# Patient Record
Sex: MALE | Race: WHITE | NOT HISPANIC OR LATINO | Employment: FULL TIME | URBAN - NONMETROPOLITAN AREA
[De-identification: names, ages, dates, MRNs, and addresses within clinical notes are randomized per-mention and may not be internally consistent; named-entity substitution may affect disease eponyms.]

---

## 2019-01-30 ENCOUNTER — HOSPITAL ENCOUNTER (OUTPATIENT)
Dept: CT IMAGING | Facility: HOSPITAL | Age: 39
Discharge: HOME | End: 2019-01-30
Admitting: NURSE PRACTITIONER

## 2019-01-30 ENCOUNTER — OFFICE VISIT (OUTPATIENT)
Dept: INTERNAL MEDICINE | Facility: CLINIC | Age: 39
End: 2019-01-30

## 2019-01-30 ENCOUNTER — APPOINTMENT (OUTPATIENT)
Dept: LAB | Facility: HOSPITAL | Age: 39
End: 2019-01-30

## 2019-01-30 VITALS
HEART RATE: 85 BPM | HEIGHT: 69 IN | OXYGEN SATURATION: 98 % | TEMPERATURE: 97.8 F | WEIGHT: 214 LBS | BODY MASS INDEX: 31.7 KG/M2 | RESPIRATION RATE: 15 BRPM | SYSTOLIC BLOOD PRESSURE: 120 MMHG | DIASTOLIC BLOOD PRESSURE: 82 MMHG

## 2019-01-30 DIAGNOSIS — R10.84 GENERALIZED ABDOMINAL PAIN: Primary | ICD-10-CM

## 2019-01-30 DIAGNOSIS — R10.31 RIGHT LOWER QUADRANT ABDOMINAL PAIN: ICD-10-CM

## 2019-01-30 DIAGNOSIS — B02.9 HERPES ZOSTER WITHOUT COMPLICATION: ICD-10-CM

## 2019-01-30 DIAGNOSIS — R10.32 LEFT LOWER QUADRANT PAIN: ICD-10-CM

## 2019-01-30 DIAGNOSIS — Z76.89 ENCOUNTER TO ESTABLISH CARE: ICD-10-CM

## 2019-01-30 LAB
ALBUMIN SERPL-MCNC: 4.6 G/DL (ref 3.5–5)
ALBUMIN/GLOB SERPL: 1.5 G/DL (ref 1–2)
ALP SERPL-CCNC: 60 U/L (ref 38–126)
ALT SERPL W P-5'-P-CCNC: 63 U/L (ref 13–69)
AMYLASE SERPL-CCNC: 86 U/L (ref 30–110)
ANION GAP SERPL CALCULATED.3IONS-SCNC: 11.5 MMOL/L (ref 10–20)
AST SERPL-CCNC: 39 U/L (ref 15–46)
BASOPHILS # BLD AUTO: 0.06 10*3/MM3 (ref 0–0.2)
BASOPHILS NFR BLD AUTO: 0.8 % (ref 0–2.5)
BILIRUB BLD-MCNC: ABNORMAL MG/DL
BILIRUB SERPL-MCNC: 0.6 MG/DL (ref 0.2–1.3)
BUN BLD-MCNC: 12 MG/DL (ref 7–20)
BUN/CREAT SERPL: 10 (ref 6.3–21.9)
CALCIUM SPEC-SCNC: 9.6 MG/DL (ref 8.4–10.2)
CHLORIDE SERPL-SCNC: 104 MMOL/L (ref 98–107)
CLARITY, POC: ABNORMAL
CO2 SERPL-SCNC: 29 MMOL/L (ref 26–30)
COLOR UR: YELLOW
CREAT BLD-MCNC: 1.2 MG/DL (ref 0.6–1.3)
DEPRECATED RDW RBC AUTO: 41.6 FL (ref 37–54)
EOSINOPHIL # BLD AUTO: 0.39 10*3/MM3 (ref 0–0.7)
EOSINOPHIL NFR BLD AUTO: 5.3 % (ref 0–7)
ERYTHROCYTE [DISTWIDTH] IN BLOOD BY AUTOMATED COUNT: 12.2 % (ref 11.5–14.5)
GFR SERPL CREATININE-BSD FRML MDRD: 67 ML/MIN/1.73
GGT SERPL-CCNC: 36 U/L (ref 12–58)
GLOBULIN UR ELPH-MCNC: 3.1 GM/DL
GLUCOSE BLD-MCNC: 88 MG/DL (ref 74–98)
GLUCOSE UR STRIP-MCNC: NEGATIVE MG/DL
HCT VFR BLD AUTO: 43.8 % (ref 42–52)
HGB BLD-MCNC: 15.2 G/DL (ref 14–18)
IMM GRANULOCYTES # BLD AUTO: 0.01 10*3/MM3 (ref 0–0.06)
IMM GRANULOCYTES NFR BLD AUTO: 0.1 % (ref 0–0.6)
KETONES UR QL: NEGATIVE
LEUKOCYTE EST, POC: NEGATIVE
LIPASE SERPL-CCNC: 60 U/L (ref 23–300)
LYMPHOCYTES # BLD AUTO: 2.89 10*3/MM3 (ref 0.6–3.4)
LYMPHOCYTES NFR BLD AUTO: 39.1 % (ref 10–50)
MCH RBC QN AUTO: 32.3 PG (ref 27–31)
MCHC RBC AUTO-ENTMCNC: 34.7 G/DL (ref 30–37)
MCV RBC AUTO: 93 FL (ref 80–94)
MONOCYTES # BLD AUTO: 0.76 10*3/MM3 (ref 0–0.9)
MONOCYTES NFR BLD AUTO: 10.3 % (ref 0–12)
NEUTROPHILS # BLD AUTO: 3.29 10*3/MM3 (ref 2–6.9)
NEUTROPHILS NFR BLD AUTO: 44.4 % (ref 37–80)
NITRITE UR-MCNC: NEGATIVE MG/ML
NRBC BLD AUTO-RTO: 0 /100 WBC (ref 0–0)
PH UR: 5 [PH] (ref 5–8)
PLATELET # BLD AUTO: 211 10*3/MM3 (ref 130–400)
PMV BLD AUTO: 9.6 FL (ref 6–12)
POTASSIUM BLD-SCNC: 4.5 MMOL/L (ref 3.5–5.1)
PROT SERPL-MCNC: 7.7 G/DL (ref 6.3–8.2)
PROT UR STRIP-MCNC: NEGATIVE MG/DL
RBC # BLD AUTO: 4.71 10*6/MM3 (ref 4.7–6.1)
RBC # UR STRIP: NEGATIVE /UL
SODIUM BLD-SCNC: 140 MMOL/L (ref 137–145)
SP GR UR: 1.02 (ref 1–1.03)
UROBILINOGEN UR QL: ABNORMAL
WBC NRBC COR # BLD: 7.4 10*3/MM3 (ref 4.8–10.8)

## 2019-01-30 PROCEDURE — 85025 COMPLETE CBC W/AUTO DIFF WBC: CPT | Performed by: NURSE PRACTITIONER

## 2019-01-30 PROCEDURE — 74176 CT ABD & PELVIS W/O CONTRAST: CPT

## 2019-01-30 PROCEDURE — 36415 COLL VENOUS BLD VENIPUNCTURE: CPT | Performed by: NURSE PRACTITIONER

## 2019-01-30 PROCEDURE — 80053 COMPREHEN METABOLIC PANEL: CPT | Performed by: NURSE PRACTITIONER

## 2019-01-30 PROCEDURE — 99203 OFFICE O/P NEW LOW 30 MIN: CPT | Performed by: NURSE PRACTITIONER

## 2019-01-30 PROCEDURE — 83690 ASSAY OF LIPASE: CPT | Performed by: NURSE PRACTITIONER

## 2019-01-30 PROCEDURE — 81003 URINALYSIS AUTO W/O SCOPE: CPT | Performed by: NURSE PRACTITIONER

## 2019-01-30 PROCEDURE — 82150 ASSAY OF AMYLASE: CPT | Performed by: NURSE PRACTITIONER

## 2019-01-30 PROCEDURE — 82977 ASSAY OF GGT: CPT | Performed by: NURSE PRACTITIONER

## 2019-01-30 RX ORDER — KETOROLAC TROMETHAMINE 30 MG/ML
60 INJECTION, SOLUTION INTRAMUSCULAR; INTRAVENOUS ONCE
Status: COMPLETED | OUTPATIENT
Start: 2019-01-30 | End: 2019-01-30

## 2019-01-30 RX ADMIN — KETOROLAC TROMETHAMINE 60 MG: 30 INJECTION, SOLUTION INTRAMUSCULAR; INTRAVENOUS at 17:22

## 2019-01-31 RX ORDER — VALACYCLOVIR HYDROCHLORIDE 1 G/1
1000 TABLET, FILM COATED ORAL 3 TIMES DAILY
Qty: 21 TABLET | Refills: 0 | Status: SHIPPED | OUTPATIENT
Start: 2019-01-31 | End: 2019-02-07

## 2019-02-01 RX ORDER — GABAPENTIN 100 MG/1
100 CAPSULE ORAL 3 TIMES DAILY
Qty: 21 CAPSULE | Refills: 0 | Status: SHIPPED | OUTPATIENT
Start: 2019-02-01 | End: 2019-02-06 | Stop reason: SDUPTHER

## 2019-02-06 DIAGNOSIS — B02.9 HERPES ZOSTER WITHOUT COMPLICATION: ICD-10-CM

## 2019-02-06 RX ORDER — GABAPENTIN 300 MG/1
300 CAPSULE ORAL 2 TIMES DAILY
Qty: 14 CAPSULE | Refills: 0 | Status: SHIPPED | OUTPATIENT
Start: 2019-02-06 | End: 2019-02-13

## 2019-02-15 RX ORDER — GABAPENTIN 300 MG/1
300 CAPSULE ORAL 2 TIMES DAILY
Qty: 14 CAPSULE | Refills: 0 | Status: SHIPPED | OUTPATIENT
Start: 2019-02-15 | End: 2019-02-22

## 2021-06-11 ENCOUNTER — CLINICAL SUPPORT (OUTPATIENT)
Dept: INTERNAL MEDICINE | Facility: CLINIC | Age: 41
End: 2021-06-11

## 2021-06-11 DIAGNOSIS — L25.5 DERMATITIS DUE TO PLANTS, INCLUDING POISON IVY, SUMAC, AND OAK: Primary | ICD-10-CM

## 2021-06-11 DIAGNOSIS — L23.7 POISON IVY: ICD-10-CM

## 2021-06-11 PROCEDURE — 96372 THER/PROPH/DIAG INJ SC/IM: CPT | Performed by: NURSE PRACTITIONER

## 2021-06-11 RX ORDER — METHYLPREDNISOLONE ACETATE 80 MG/ML
80 INJECTION, SUSPENSION INTRA-ARTICULAR; INTRALESIONAL; INTRAMUSCULAR; SOFT TISSUE ONCE
Status: COMPLETED | OUTPATIENT
Start: 2021-06-11 | End: 2021-06-11

## 2021-06-11 RX ADMIN — METHYLPREDNISOLONE ACETATE 80 MG: 80 INJECTION, SUSPENSION INTRA-ARTICULAR; INTRALESIONAL; INTRAMUSCULAR; SOFT TISSUE at 10:49

## 2021-12-22 ENCOUNTER — TELEPHONE (OUTPATIENT)
Dept: INTERNAL MEDICINE | Facility: CLINIC | Age: 41
End: 2021-12-22

## 2021-12-22 NOTE — TELEPHONE ENCOUNTER
Caller: Will Gamez    Relationship: Self    Best call back number: [phone]    What form or medical record are you requesting: SLEEP STUDY RESULTS    Who is requesting this form or medical record from you: FOR HIS OWN RECORDS    How would you like to receive the form or medical records (pick-up, mail, fax):     Timeframe paperwork needed: ASAP    Additional notes: PLEASE CALL IF FOUND IN CHART

## 2022-02-11 DIAGNOSIS — R06.81 WITNESSED EPISODE OF APNEA: ICD-10-CM

## 2022-02-11 DIAGNOSIS — R53.83 FATIGUE, UNSPECIFIED TYPE: ICD-10-CM

## 2022-02-11 DIAGNOSIS — R06.83 SNORING: ICD-10-CM

## 2022-02-11 DIAGNOSIS — G47.30 SLEEP APNEA, UNSPECIFIED TYPE: Primary | ICD-10-CM

## 2022-03-21 ENCOUNTER — OFFICE VISIT (OUTPATIENT)
Dept: SLEEP MEDICINE | Facility: CLINIC | Age: 42
End: 2022-03-21

## 2022-03-21 VITALS
OXYGEN SATURATION: 97 % | WEIGHT: 218 LBS | BODY MASS INDEX: 32.29 KG/M2 | HEART RATE: 90 BPM | HEIGHT: 69 IN | DIASTOLIC BLOOD PRESSURE: 87 MMHG | SYSTOLIC BLOOD PRESSURE: 136 MMHG

## 2022-03-21 DIAGNOSIS — G47.33 OBSTRUCTIVE SLEEP APNEA, ADULT: Primary | ICD-10-CM

## 2022-03-21 DIAGNOSIS — E66.09 CLASS 1 OBESITY DUE TO EXCESS CALORIES WITHOUT SERIOUS COMORBIDITY WITH BODY MASS INDEX (BMI) OF 32.0 TO 32.9 IN ADULT: ICD-10-CM

## 2022-03-21 PROCEDURE — 99202 OFFICE O/P NEW SF 15 MIN: CPT | Performed by: INTERNAL MEDICINE

## 2022-04-05 NOTE — PROGRESS NOTES
"Chief Complaint  Snoring and nonrestorative sleep    Subjective         Will Gamez presents to [facility] for the evaluation of snoring and nonrestorative sleep.  He is referred by NEEMA Flores.  He is seen in person in the sleep clinic.  History of Present Illness  Patient had a history of snoring for at least 8 years.  He has been noted to have occasional apneas.  He had a sleep study 4 years ago at the [facility] but did not return for results and did not initiate therapy.  He continues to have snoring and sometimes has a sore throat at night.  He is not rested in the morning.  He awakens himself snoring at times.  He denies having morning headache.  He thinks his weight is about the same as when he had his study.    He denies ever breaking his nose.  He does have occasional trouble breathing through his nose when he has allergies.  He denies being sleepy during the day.  He denies having problems driving.  He denies kicking or jerking his legs at night.  He denies any chronic pain that keeps him awake.    He goes to bed about 11 PM.  He will fall asleep in 30 minutes.  He awakens twice during the night.  He thinks he gets about 6 hours of sleep but is not rested.  He denies any history of hypertension, diabetes, coronary artery disease.    Allergies: He has some environmental allergies    Habits: Tobacco: He chews 1/2 can a day for over 15 years.    Alcohol: He has 1 drink per month    Caffeine: He has 1 cola per day    Medical illnesses: Without    Medications: Without    Surgeries: Without    Family history: He denies any significant family history    Review of systems: Positives include sinus pressure, sore throat, apnea.  Other systems reviewed and negative.    Lorain score is 2/24  Objective   Vital Signs:   /87 (BP Location: Left arm, Patient Position: Sitting, Cuff Size: Adult)   Pulse 90   Ht 175.3 cm (69\")   Wt 98.9 kg (218 lb)   SpO2 97%   " BMI 32.19 kg/m²           Physical Exam patient appears to be awake and alert.  He is not appear to be in acute respiratory distress.    He is normocephalic.  He has Mallampati class III anatomy.    Lungs are clear.    Cardiac exam revealed normal S1-S2.    Extremities showed no edema.  Result Review :    Patient had a sleep study January 30, 2018.  He had an AHI of 8.9.  In the supine position his index was 13.1.     Assessment and Plan  Diagnoses and all orders for this visit:    1. Obstructive sleep apnea, adult (Primary)  -     Detailed AutoPAP Order    2. Class 1 obesity due to excess calories without serious comorbidity with body mass index (BMI) of 32.0 to 32.9 in adult    Patient has a history of mild obstructive sleep apnea.  He did not initiate therapy but currently remains symptomatic.  He is now interested in initiating therapy.  We have discussed potential therapies including CPAP, weight control, oral appliance, and surgery.  We have discussed the long-term consequences of untreated obstructive sleep apnea.  These include hypertension, diabetes, heart disease, stroke, and dementia.  After discussion he wishes to try CPAP.  We will place orders for CPAP machine.  We will place him on an AutoSet device.  We will plan to see him back with 2 months after he is on his machine.  He is encouraged to lose weight.  He declines referral to dietitian.  He is encouraged to avoid alcohol or sedatives close to bedtime.  He is encouraged to practice lateral position sleep.  I spent 25 minutes caring for Will on this date of service. This time includes time spent by me in the following activities:reviewing tests, obtaining and/or reviewing a separately obtained history, performing a medically appropriate examination and/or evaluation , counseling and educating the patient/family/caregiver, ordering medications, tests, or procedures and documenting information in the medical record  Follow Up   Return in about 2  months (around 5/21/2022) for 31 to 90 days after PAP setup, Next scheduled follow-up.  Patient was given instructions and counseling regarding his condition or for health maintenance advice. Please see specific information pulled into the AVS if appropriate.   Ivan Beaulieu MD [facility]  Sleep Medicine  Pulmonary and Critical Care Medicine

## 2022-04-15 ENCOUNTER — CLINICAL SUPPORT (OUTPATIENT)
Dept: INTERNAL MEDICINE | Facility: CLINIC | Age: 42
End: 2022-04-15

## 2022-04-15 DIAGNOSIS — R10.32 LLQ PAIN: Primary | ICD-10-CM

## 2022-04-15 LAB
BILIRUB BLD-MCNC: NEGATIVE MG/DL
CLARITY, POC: ABNORMAL
COLOR UR: YELLOW
EXPIRATION DATE: ABNORMAL
GLUCOSE UR STRIP-MCNC: NEGATIVE MG/DL
KETONES UR QL: NEGATIVE
LEUKOCYTE EST, POC: NEGATIVE
Lab: ABNORMAL
NITRITE UR-MCNC: NEGATIVE MG/ML
PH UR: 6 [PH] (ref 5–8)
PROT UR STRIP-MCNC: NEGATIVE MG/DL
RBC # UR STRIP: NEGATIVE /UL
SP GR UR: 1.01 (ref 1–1.03)
UROBILINOGEN UR QL: NORMAL

## 2022-04-15 PROCEDURE — 81003 URINALYSIS AUTO W/O SCOPE: CPT | Performed by: NURSE PRACTITIONER

## 2022-04-15 PROCEDURE — 96372 THER/PROPH/DIAG INJ SC/IM: CPT | Performed by: NURSE PRACTITIONER

## 2022-04-15 RX ORDER — AMOXICILLIN AND CLAVULANATE POTASSIUM 875; 125 MG/1; MG/1
1 TABLET, FILM COATED ORAL 2 TIMES DAILY
Qty: 20 TABLET | Refills: 0 | Status: SHIPPED | OUTPATIENT
Start: 2022-04-15 | End: 2022-04-25

## 2022-04-15 RX ORDER — KETOROLAC TROMETHAMINE 30 MG/ML
60 INJECTION, SOLUTION INTRAMUSCULAR; INTRAVENOUS ONCE
Status: COMPLETED | OUTPATIENT
Start: 2022-04-15 | End: 2022-04-15

## 2022-04-15 RX ADMIN — KETOROLAC TROMETHAMINE 60 MG: 30 INJECTION, SOLUTION INTRAMUSCULAR; INTRAVENOUS at 15:49

## 2022-04-17 DIAGNOSIS — R19.4 CHANGE IN BOWEL HABIT: ICD-10-CM

## 2022-04-17 DIAGNOSIS — R10.32 LLQ PAIN: Primary | ICD-10-CM

## 2022-04-17 LAB
BACTERIA UR CULT: NO GROWTH
BACTERIA UR CULT: NORMAL

## 2022-05-01 DIAGNOSIS — L25.5 DERMATITIS DUE TO PLANTS, INCLUDING POISON IVY, SUMAC, AND OAK: Primary | ICD-10-CM

## 2022-06-17 ENCOUNTER — OFFICE VISIT (OUTPATIENT)
Dept: SLEEP MEDICINE | Facility: CLINIC | Age: 42
End: 2022-06-17

## 2022-06-17 VITALS
SYSTOLIC BLOOD PRESSURE: 131 MMHG | HEIGHT: 69 IN | BODY MASS INDEX: 31.99 KG/M2 | OXYGEN SATURATION: 98 % | HEART RATE: 67 BPM | DIASTOLIC BLOOD PRESSURE: 74 MMHG | WEIGHT: 216 LBS

## 2022-06-17 DIAGNOSIS — E66.09 CLASS 1 OBESITY DUE TO EXCESS CALORIES WITHOUT SERIOUS COMORBIDITY WITH BODY MASS INDEX (BMI) OF 30.0 TO 30.9 IN ADULT: ICD-10-CM

## 2022-06-17 DIAGNOSIS — G47.33 OBSTRUCTIVE SLEEP APNEA, ADULT: Primary | ICD-10-CM

## 2022-06-17 PROCEDURE — 99213 OFFICE O/P EST LOW 20 MIN: CPT | Performed by: INTERNAL MEDICINE

## 2022-07-03 NOTE — PROGRESS NOTES
"Chief Complaint  Follow-up snoring and obstructive sleep apnea    Subjective        Will Gamez presents to [facility] for the evaluation of snoring and obstructive sleep apnea.  His primary care provider is NEEMA Flores.  He is seen in person in the sleep clinic.  History of Present Illness  Patient was last seen in clinic March 21.  He had mild obstructive sleep apnea with an AHI of 8.9 on his previous study.  He has been on CPAP.  He says he is sleeping better when he is able to keep his mask on.  He uses a fullface mask which sometimes sleeps.  He does not snore when he has it on.       Review of systems: Positives include sinus pressure, sore throat, apnea.  Other systems reviewed and negative.      Dover score is 2/24  Objective   Vital Signs:  /74   Pulse 67   Ht 175.3 cm (69\")   Wt 98 kg (216 lb)   SpO2 98%   BMI 31.90 kg/m²   Estimated body mass index is 31.9 kg/m² as calculated from the following:    Height as of this encounter: 175.3 cm (69\").    Weight as of this encounter: 98 kg (216 lb).          Physical Exam patient appears to be awake and alert.  He does not appear to be in acute respiratory distress.    He is normocephalic.    Lungs are clear.    Cardiac exam revealed normal S1 1 S2.    Extremities showed no edema.  Result Review :    Download for the past 32 days shows he used the machine 78% of the time.  He is averaging 3 hours 13 minutes per night.  His AHI is normal at 1.5.  His 90th percentile pressure is 8.7.     Assessment and Plan   Diagnoses and all orders for this visit:    1. Obstructive sleep apnea, adult (Primary)  -     Detailed AutoPAP Order    2. Class 1 obesity due to excess calories without serious comorbidity with body mass index (BMI) of 30.0 to 30.9 in adult    Patient has mild obstructive sleep apnea and has excellent control of his respiratory events when he is able to have the machine on.  He needs to try to increase " his hours or use and frequency of use.  He needs to work with his [employer] company to get a mask that does not bother him or leak.  We will renew his supplies.  He is encouraged to give ideal body weight.  He is encouraged to avoid alcohol and sedatives close to bedtime.  He is encouraged to practice lateral position sleep.  We will plan to see him back in 1 year.  He is to contact us earlier symptoms worsen.       I spent 25 minutes caring for Will on this date of service. This time includes time spent by me in the following activities:reviewing tests, obtaining and/or reviewing a separately obtained history, performing a medically appropriate examination and/or evaluation , counseling and educating the patient/family/caregiver, ordering medications, tests, or procedures and documenting information in the medical record  Follow Up   Return in about 1 year (around 6/17/2023) for Annual visit, Next scheduled follow-up.  Patient was given instructions and counseling regarding his condition or for health maintenance advice. Please see specific information pulled into the AVS if appropriate.   Ivan Beaulieu MD [facility]  Sleep Medicine  Pulmonary and Critical Care Medicine

## 2023-02-12 RX ORDER — CYCLOBENZAPRINE HCL 5 MG
5 TABLET ORAL NIGHTLY PRN
Qty: 20 TABLET | Refills: 2 | Status: SHIPPED | OUTPATIENT
Start: 2023-02-12

## 2023-05-08 RX ORDER — AMOXICILLIN AND CLAVULANATE POTASSIUM 875; 125 MG/1; MG/1
1 TABLET, FILM COATED ORAL 2 TIMES DAILY
Qty: 20 TABLET | Refills: 0 | Status: SHIPPED | OUTPATIENT
Start: 2023-05-08 | End: 2023-05-18

## 2023-06-19 ENCOUNTER — TELEMEDICINE (OUTPATIENT)
Dept: INTERNAL MEDICINE | Facility: CLINIC | Age: 43
End: 2023-06-19
Payer: COMMERCIAL

## 2023-06-19 DIAGNOSIS — M25.571 PAIN AND SWELLING OF RIGHT ANKLE: Primary | ICD-10-CM

## 2023-06-19 DIAGNOSIS — M25.471 PAIN AND SWELLING OF RIGHT ANKLE: Primary | ICD-10-CM

## 2023-06-19 PROCEDURE — 99213 OFFICE O/P EST LOW 20 MIN: CPT | Performed by: NURSE PRACTITIONER

## 2023-06-19 NOTE — PROGRESS NOTES
You have chosen to receive care through a telehealth visit.  Do you consent to use a video/audio connection for your medical care today? Yes  Active Parties: Estefany BETHEA (work), Guillermo Varela MA (work) and Will (home)      Chief Complaint / Reason:      Chief Complaint   Patient presents with    Foot Pain     Rt foot        Subjective     Foot Pain    Patient presents today via video visit with complaints of right foot/ankle pain.  He states 1 week ago Monday he injured his right foot and it still has continued to give him problems despite rest elevation ice NSAIDs and did use walking boot for a little while but has been inconsistent.  He states that it hurts more when sitting and when he first gets up but walking does not necessarily the pain worse but he continues to have swelling and the pain is worse at the top of the foot.  History taken from: patient    PMH/FH/Social History were reviewed and updated appropriately in the electronic medical record.   History reviewed. No pertinent past medical history.  History reviewed. No pertinent surgical history.  Social History     Socioeconomic History    Marital status:    Tobacco Use    Smoking status: Never    Smokeless tobacco: Current     Types: Chew   Vaping Use    Vaping Use: Never used   Substance and Sexual Activity    Alcohol use: Yes     Alcohol/week: 2.0 - 3.0 standard drinks     Types: 2 - 3 Cans of beer per week     Comment: every other week    Drug use: No    Sexual activity: Defer     Family History   Problem Relation Age of Onset    Hypertension Mother     Hypertension Brother        Review of Systems:   Review of Systems      All other systems were reviewed and are negative.  Exceptions are noted in the subjective or above.      Objective     Vital Signs  There were no vitals filed for this visit.    There is no height or weight on file to calculate BMI.    Physical Exam  Nursing note reviewed.   Constitutional:       General: He is not  in acute distress.     Appearance: He is well-developed. He is not diaphoretic.   HENT:      Head: Normocephalic.   Pulmonary:      Effort: Pulmonary effort is normal. No respiratory distress.   Chest:      Chest wall: No tenderness.   Musculoskeletal:         General: Swelling, tenderness, deformity and signs of injury present.      Right foot: Bony tenderness present.        Legs:       Comments: Physical examination directed per patient -Pain noted with flexion and sitting down    Skin:     General: Skin is warm and dry.      Capillary Refill: Capillary refill takes less than 2 seconds.   Neurological:      Mental Status: He is alert and oriented to person, place, and time.   Psychiatric:         Behavior: Behavior normal.         Thought Content: Thought content normal.         Judgment: Judgment normal.            Results Review:    I reviewed the patient's previous clinical results.       Medication Review:   No current outpatient medications on file.    Diagnoses and all orders for this visit:    Pain and swelling of right ankle  -     Ambulatory Referral to Orthopedic Surgery    Advised patient to avoid standing or sitting for long periods of time.  Discussed home care instructions along with worsening signs and symptoms discussed differential diagnosis.      Return if symptoms worsen or fail to improve, for Annual.    Estefany Augustine, APRN  06/19/2023

## 2023-11-22 ENCOUNTER — TELEMEDICINE (OUTPATIENT)
Dept: SLEEP MEDICINE | Facility: CLINIC | Age: 43
End: 2023-11-22
Payer: COMMERCIAL

## 2023-11-22 DIAGNOSIS — G47.33 OSA (OBSTRUCTIVE SLEEP APNEA): Primary | ICD-10-CM

## 2023-11-22 PROCEDURE — 99213 OFFICE O/P EST LOW 20 MIN: CPT | Performed by: NURSE PRACTITIONER

## 2023-11-22 NOTE — PROGRESS NOTES
[facility] Follow up    CHIEF COMPLAINT    fatigue    HISTORY OF PRESENT ILLNESS    Will Gamez is a 43 y.o.male here today for follow-up.  He was last seen in the office by Dr. Beaulieu in June 2022.  His original sleep study was in 2018 and was found to have moderate obstructive sleep apnea.  He was started on CPAP but unfortunately was unable to tolerate this treatment.    He has noticed more daytime somnolence, nonrestorative sleep and loud snoring.  He does feel like his apnea may have gotten worse since his previous study.    He denies any chest pain or palpitations.  Denies any lower extremity edema or calf tenderness.  He denies any morning headaches.    He is a lifetime non-smoker.  He does use smokeless tobacco.    He denies any reflux symptoms.    His Allison Sleepiness Scale is 10/24    Patient Active Problem List   Diagnosis    LAW (obstructive sleep apnea)       No Known Allergies  No current outpatient medications on file.  MEDICATION LIST AND ALLERGIES REVIEWED.    Social History     Tobacco Use    Smoking status: Never    Smokeless tobacco: Current     Types: Chew   Vaping Use    Vaping Use: Never used   Substance Use Topics    Alcohol use: Yes     Alcohol/week: 2.0 - 3.0 standard drinks of alcohol     Types: 2 - 3 Cans of beer per week     Comment: every other week    Drug use: No       FAMILY AND SOCIAL HISTORY REVIEWED.    Review of Systems   Constitutional:  Positive for fatigue. Negative for activity change, appetite change, fever and unexpected weight change.   HENT:  Negative for congestion, postnasal drip, rhinorrhea, sinus pressure, sore throat and voice change.    Eyes:  Negative for visual disturbance.   Respiratory:  Negative for cough, chest tightness, shortness of breath and wheezing.    Cardiovascular:  Negative for chest pain, palpitations and leg swelling.   Gastrointestinal:  Negative for abdominal distention, abdominal pain, nausea and vomiting.   Endocrine: Negative for  cold intolerance and heat intolerance.   Genitourinary:  Negative for difficulty urinating and urgency.   Musculoskeletal:  Negative for arthralgias, back pain and neck pain.   Skin:  Negative for color change and pallor.   Allergic/Immunologic: Negative for environmental allergies and food allergies.   Neurological:  Negative for dizziness, syncope, weakness and light-headedness.   Hematological:  Negative for adenopathy. Does not bruise/bleed easily.   Psychiatric/Behavioral:  Positive for sleep disturbance. Negative for agitation and behavioral problems.    .    There were no vitals taken for this visit.    Immunization History   Administered Date(s) Administered    Hepatitis A 12/14/2018       Physical Exam  Unable to do physical exam due to telemedicine visit, patient is in no respiratory stress at entire visit.      PROBLEM LIST    Problem List Items Addressed This Visit          Sleep    LAW (obstructive sleep apnea) - Primary    Relevant Orders    Home Sleep Study    Ambulatory Referral to ENT (Otolaryngology)         DISCUSSION  You have chosen to receive care through a telehealth visit.  Do you consent to use a video/audio connection for your medical care today? Yes    Mr. Gamez was here for a follow-up of his obstructive sleep apnea.  Unfortunately he was in able to tolerate CPAP therapy.  He did try CPAP therapy for at least 90 days and was unable to tolerate.  He has noticed more daytime somnolence, apnea at night and nonrestorative sleep since not using his CPAP.    He is interested in the inspire device.  I will place a referral to Dr. Mcgrath to see if he qualifies for this.    He has to have a new home sleep study since his sleep study was in 2018.  I will go ahead and place a new home sleep study as well.  I will contact him once we receive the results.    We will set up a follow-up after he has been seen by Dr. Mcgrath and has had his repeat sleep study performed.    I personally spent a total  of 25 minutes on patient visit today including chart review, face to face with the patient obtaining the history and physical exam, review of pertinent images and tests, counseling and discussion and/or coordination of care as described above, and documentation.  Total time excludes time spent on other separate services such as performing procedures or test interpretation, if applicable.        NEEMA Goddard  11/22/202313:57 EST  Electronically signed     Please note that portions of this note were completed with a voice recognition program.        CC: Estefany Augustine, APRN

## 2023-12-11 ENCOUNTER — HOSPITAL ENCOUNTER (OUTPATIENT)
Dept: SLEEP MEDICINE | Facility: HOSPITAL | Age: 43
Discharge: HOME | End: 2023-12-11
Admitting: NURSE PRACTITIONER
Payer: COMMERCIAL

## 2023-12-11 VITALS — HEIGHT: 69 IN | WEIGHT: 226 LBS | BODY MASS INDEX: 33.47 KG/M2

## 2023-12-11 DIAGNOSIS — G47.33 OSA (OBSTRUCTIVE SLEEP APNEA): ICD-10-CM

## 2023-12-11 PROCEDURE — 95800 SLP STDY UNATTENDED: CPT

## 2023-12-13 DIAGNOSIS — G47.33 OSA (OBSTRUCTIVE SLEEP APNEA): Primary | ICD-10-CM

## 2023-12-13 NOTE — PROGRESS NOTES
Patient is want to hold off on setting up CPAP until after seen by Dr. Mcgrath for the inspire device.  He has an appointment on 12/21 with Dr. Mcgrath.

## 2023-12-14 PROCEDURE — 95800 SLP STDY UNATTENDED: CPT | Performed by: INTERNAL MEDICINE

## 2023-12-22 DIAGNOSIS — G47.33 OSA (OBSTRUCTIVE SLEEP APNEA): Primary | ICD-10-CM

## 2024-03-30 RX ORDER — CYCLOBENZAPRINE HCL 5 MG
5 TABLET ORAL NIGHTLY PRN
Qty: 20 TABLET | Refills: 1 | Status: SHIPPED | OUTPATIENT
Start: 2024-03-30

## 2024-04-19 ENCOUNTER — TELEMEDICINE (OUTPATIENT)
Dept: INTERNAL MEDICINE | Facility: CLINIC | Age: 44
End: 2024-04-19
Payer: COMMERCIAL

## 2024-04-19 DIAGNOSIS — R10.30 LOWER ABDOMINAL PAIN: Primary | ICD-10-CM

## 2024-04-19 DIAGNOSIS — Z12.11 SCREEN FOR COLON CANCER: ICD-10-CM

## 2024-04-19 DIAGNOSIS — Z87.19 HISTORY OF DIVERTICULITIS: ICD-10-CM

## 2024-04-19 DIAGNOSIS — K57.90 DIVERTICULOSIS: ICD-10-CM

## 2024-04-19 DIAGNOSIS — Z87.19 HISTORY OF DIVERTICULOSIS: ICD-10-CM

## 2024-04-19 RX ORDER — OXYCODONE HYDROCHLORIDE AND ACETAMINOPHEN 5; 325 MG/1; MG/1
1 TABLET ORAL EVERY 6 HOURS PRN
Qty: 12 TABLET | Refills: 0 | Status: SHIPPED | OUTPATIENT
Start: 2024-04-19

## 2024-04-19 RX ORDER — AMOXICILLIN AND CLAVULANATE POTASSIUM 875; 125 MG/1; MG/1
1 TABLET, FILM COATED ORAL 2 TIMES DAILY
Qty: 20 TABLET | Refills: 0 | Status: SHIPPED | OUTPATIENT
Start: 2024-04-19

## 2024-04-19 NOTE — PROGRESS NOTES
You have chosen to receive care through a telehealth visit.  Do you consent to use a video/audio connection for your medical care today? Yes  Active Parties: Estefany BETHEA (work), Guillermo Varela MA (work) and patient (work)        Chief Complaint / Reason:      Chief Complaint   Patient presents with    Diverticulitis       Subjective     Diverticulitis      Patient presents today via video visit with complaints of abdominal pain that has been going on for several days and has gotten worse.  Denies fever or chills but states that the pain was so severe that he thought he was going to have to go to the ER and get back brought him to tears.  He has had a CT of his abdomen and pelvis in the past which did show diverticula.  Denies any changes in bowel habits or any blood in stool.  Denies any nausea or vomiting.  He states he is had pain like this before and felt like it was diverticulitis.  He is not up-to-date on colonoscopy and we will refer.  He states that the pain is more on the left side and in his lower abdomen close to his bellybutton but nothing on the right side.  History taken from: patient    PMH/FH/Social History were reviewed and updated appropriately in the electronic medical record.   History reviewed. No pertinent past medical history.  History reviewed. No pertinent surgical history.  Social History     Socioeconomic History    Marital status:    Tobacco Use    Smoking status: Never    Smokeless tobacco: Current     Types: Chew   Vaping Use    Vaping status: Never Used   Substance and Sexual Activity    Alcohol use: Yes     Alcohol/week: 2.0 - 3.0 standard drinks of alcohol     Types: 2 - 3 Cans of beer per week     Comment: every other week    Drug use: No    Sexual activity: Defer     Family History   Problem Relation Age of Onset    Hypertension Mother     Hypertension Brother        Review of Systems:   Review of Systems      All other systems were reviewed and are negative.   Exceptions are noted in the subjective or above.      Objective     Vital Signs  Vitals:       There is no height or weight on file to calculate BMI.         Physical Exam  Nursing note reviewed.   Constitutional:       General: He is not in acute distress.     Appearance: He is well-developed. He is not diaphoretic.   HENT:      Head: Normocephalic.   Pulmonary:      Effort: Pulmonary effort is normal. No respiratory distress.   Chest:      Chest wall: No tenderness.   Abdominal:      General: Bowel sounds are normal.      Palpations: Abdomen is soft.      Tenderness: There is abdominal tenderness in the suprapubic area and left lower quadrant.          Comments: Physical exam performed per patient   Skin:     General: Skin is warm and dry.      Capillary Refill: Capillary refill takes less than 2 seconds.   Neurological:      Mental Status: He is alert and oriented to person, place, and time.   Psychiatric:         Behavior: Behavior normal.         Thought Content: Thought content normal.         Judgment: Judgment normal.              Results Review:    I reviewed the patient's new clinical results.       Medication Review:     Current Outpatient Medications:     cyclobenzaprine (FLEXERIL) 5 MG tablet, Take 1 tablet by mouth At Night As Needed for Muscle Spasms., Disp: 20 tablet, Rfl: 1    amoxicillin-clavulanate (AUGMENTIN) 875-125 MG per tablet, Take 1 tablet by mouth 2 (Two) Times a Day., Disp: 20 tablet, Rfl: 0    oxyCODONE-acetaminophen (Percocet) 5-325 MG per tablet, Take 1 tablet by mouth Every 6 (Six) Hours As Needed for Severe Pain., Disp: 12 tablet, Rfl: 0    Assessment & Plan   Diagnoses and all orders for this visit:    1. Lower abdominal pain (Primary)    2. Diverticulosis    Will refer to GI and send in Rx for Augmentin.    No follow-ups on file.    Estefany Augustine, APRN  04/19/2024

## 2024-04-29 ENCOUNTER — OFFICE VISIT (OUTPATIENT)
Dept: INTERNAL MEDICINE | Facility: CLINIC | Age: 44
End: 2024-04-29
Payer: COMMERCIAL

## 2024-04-29 ENCOUNTER — HOSPITAL ENCOUNTER (OUTPATIENT)
Dept: CT IMAGING | Facility: HOSPITAL | Age: 44
Discharge: HOME | End: 2024-04-29
Admitting: NURSE PRACTITIONER
Payer: COMMERCIAL

## 2024-04-29 VITALS
SYSTOLIC BLOOD PRESSURE: 121 MMHG | DIASTOLIC BLOOD PRESSURE: 76 MMHG | TEMPERATURE: 98.4 F | HEIGHT: 69 IN | RESPIRATION RATE: 17 BRPM | HEART RATE: 72 BPM | OXYGEN SATURATION: 97 % | WEIGHT: 225 LBS | BODY MASS INDEX: 33.33 KG/M2

## 2024-04-29 DIAGNOSIS — R10.32 LLQ PAIN: ICD-10-CM

## 2024-04-29 DIAGNOSIS — E55.9 VITAMIN D INSUFFICIENCY: ICD-10-CM

## 2024-04-29 DIAGNOSIS — R10.30 LOWER ABDOMINAL PAIN: ICD-10-CM

## 2024-04-29 DIAGNOSIS — R10.9 ABDOMINAL PAIN, UNSPECIFIED ABDOMINAL LOCATION: Primary | ICD-10-CM

## 2024-04-29 DIAGNOSIS — R10.9 LEFT FLANK PAIN: ICD-10-CM

## 2024-04-29 DIAGNOSIS — Z12.5 SCREENING PSA (PROSTATE SPECIFIC ANTIGEN): ICD-10-CM

## 2024-04-29 DIAGNOSIS — Z13.220 LIPID SCREENING: ICD-10-CM

## 2024-04-29 DIAGNOSIS — Z87.19 HISTORY OF DIVERTICULOSIS: ICD-10-CM

## 2024-04-29 DIAGNOSIS — R53.83 FATIGUE, UNSPECIFIED TYPE: ICD-10-CM

## 2024-04-29 LAB
BILIRUB BLD-MCNC: NEGATIVE MG/DL
CLARITY, POC: CLEAR
COLOR UR: YELLOW
EXPIRATION DATE: NORMAL
GLUCOSE UR STRIP-MCNC: NEGATIVE MG/DL
KETONES UR QL: NEGATIVE
LEUKOCYTE EST, POC: NEGATIVE
Lab: NORMAL
NITRITE UR-MCNC: NEGATIVE MG/ML
PH UR: 6 [PH] (ref 5–8)
PROT UR STRIP-MCNC: NEGATIVE MG/DL
RBC # UR STRIP: NEGATIVE /UL
SP GR UR: 1.03 (ref 1–1.03)
UROBILINOGEN UR QL: NORMAL

## 2024-04-29 PROCEDURE — 74177 CT ABD & PELVIS W/CONTRAST: CPT

## 2024-04-29 PROCEDURE — 99214 OFFICE O/P EST MOD 30 MIN: CPT | Performed by: NURSE PRACTITIONER

## 2024-04-29 PROCEDURE — 81003 URINALYSIS AUTO W/O SCOPE: CPT | Performed by: NURSE PRACTITIONER

## 2024-04-29 PROCEDURE — 25510000001 IOPAMIDOL 61 % SOLUTION: Performed by: NURSE PRACTITIONER

## 2024-04-29 RX ORDER — LEVOFLOXACIN 750 MG/1
750 TABLET, FILM COATED ORAL DAILY
Qty: 7 TABLET | Refills: 0 | Status: SHIPPED | OUTPATIENT
Start: 2024-04-29

## 2024-04-29 RX ORDER — OXYCODONE HYDROCHLORIDE AND ACETAMINOPHEN 5; 325 MG/1; MG/1
1 TABLET ORAL EVERY 6 HOURS PRN
Qty: 18 TABLET | Refills: 0 | Status: SHIPPED | OUTPATIENT
Start: 2024-04-29

## 2024-04-29 RX ORDER — METRONIDAZOLE 500 MG/1
500 TABLET ORAL 3 TIMES DAILY
Qty: 21 TABLET | Refills: 0 | Status: SHIPPED | OUTPATIENT
Start: 2024-04-29 | End: 2024-05-06

## 2024-04-29 RX ADMIN — IOPAMIDOL 100 ML: 612 INJECTION, SOLUTION INTRAVENOUS at 17:20

## 2024-04-29 NOTE — PROGRESS NOTES
Chief Complaint / Reason:      Chief Complaint   Patient presents with    Abdominal Pain       Subjective     HPI  The patient is a 44-year-old male who is here with complaints of abdominal pain. He reports it is on the left side and starts in the lower and radiates to the back. He does have a history of diverticula. He did have a CT scan in 2019.    He has been experiencing abdominal pain for approximately 1.5 weeks. He has no history of renal calculi. He has a history of shingles. This morning on 4/29/2024, he experienced significant soreness in his lower left quadrant of his abdomen. He experienced mild testicular discomfort yesterday on 4/28/2024, which radiated into his back. He denies any urinary difficulties or nocturia. He denies simultaneous pain on his bilateral sides. He experienced significant pressure in his back and abdomen yesterday 4/28/2024. This morning on 4/29/2024, he experienced pain related to when he holds his urine for an extended period. His urinary stream is normal. He denies any exposure to hepatitis. He describes his pain as contractions. He denies any heavy lifting, hematochezia, weight loss, or umbilical pain. He denies any nausea. His pain is exacerbated by bending over. Sitting up slightly alleviates his pain.     He denies any family history of high cholesterol or diabetes. His mother has a history of elevated cholesterol.     History taken from: patient    PMH/FH/Social History were reviewed and updated appropriately in the electronic medical record.   History reviewed. No pertinent past medical history.  History reviewed. No pertinent surgical history.  Social History     Socioeconomic History    Marital status:    Tobacco Use    Smoking status: Never    Smokeless tobacco: Current     Types: Chew   Vaping Use    Vaping status: Never Used   Substance and Sexual Activity    Alcohol use: Yes     Alcohol/week: 2.0 - 3.0 standard drinks of alcohol     Types: 2 - 3 Cans of beer  per week     Comment: every other week    Drug use: No    Sexual activity: Defer     Family History   Problem Relation Age of Onset    Hypertension Mother     Hypertension Brother        Review of Systems:   Review of Systems      All other systems were reviewed and are negative.  Exceptions are noted in the subjective or above.      Objective     Vital Signs  Vitals:    04/29/24 0749   BP: 121/76   Pulse: 72   Resp: 17   Temp: 98.4 °F (36.9 °C)   SpO2: 97%       Body mass index is 33.23 kg/m².  BMI is >= 30 and <35. (Class 1 Obesity). The following options were offered after discussion;: exercise counseling/recommendations and nutrition counseling/recommendations       Physical Exam  Vitals and nursing note reviewed.   Constitutional:       Appearance: He is well-developed.   Cardiovascular:      Rate and Rhythm: Normal rate and regular rhythm.      Pulses: Normal pulses.      Heart sounds: Normal heart sounds.   Pulmonary:      Effort: Pulmonary effort is normal.      Breath sounds: Normal breath sounds.   Chest:      Chest wall: No tenderness.   Abdominal:      General: Bowel sounds are normal.      Palpations: Abdomen is soft.      Tenderness: There is abdominal tenderness in the left lower quadrant. There is left CVA tenderness and rebound.       Skin:     General: Skin is warm and dry.      Capillary Refill: Capillary refill takes less than 2 seconds.   Neurological:      Mental Status: He is alert and oriented to person, place, and time.   Psychiatric:         Behavior: Behavior normal.         Thought Content: Thought content normal.         Judgment: Judgment normal.              Results Review:    I reviewed the patient's new clinical results.   Office Visit on 04/29/2024   Component Date Value Ref Range Status    Color 04/29/2024 Yellow  Yellow, Straw, Dark Yellow, Destiny Final    Clarity, UA 04/29/2024 Clear  Clear Final    Specific Gravity  04/29/2024 1.030  1.005 - 1.030 Final    pH, Urine 04/29/2024  6.0  5.0 - 8.0 Final    Leukocytes 04/29/2024 Negative  Negative Final    Nitrite, UA 04/29/2024 Negative  Negative Final    Protein, POC 04/29/2024 Negative  Negative mg/dL Final    Glucose, UA 04/29/2024 Negative  Negative mg/dL Final    Ketones, UA 04/29/2024 Negative  Negative Final    Urobilinogen, UA 04/29/2024 Normal  Normal, 0.2 E.U./dL Final    Bilirubin 04/29/2024 Negative  Negative Final    Blood, UA 04/29/2024 Negative  Negative Final    Lot Number 04/29/2024 [id number]   Final    Expiration Date 04/29/2024 1/14/25   Final           Medication Review:     Current Outpatient Medications:     oxyCODONE-acetaminophen (Percocet) 5-325 MG per tablet, Take 1 tablet by mouth Every 6 (Six) Hours As Needed for Severe Pain., Disp: 12 tablet, Rfl: 0    cyclobenzaprine (FLEXERIL) 5 MG tablet, Take 1 tablet by mouth At Night As Needed for Muscle Spasms. (Patient not taking: Reported on 4/29/2024), Disp: 20 tablet, Rfl: 1    Assessment & Plan   Diagnoses and all orders for this visit:    1. Abdominal pain, unspecified abdominal location (Primary)  -     POCT urinalysis dipstick, automated  -     Urine Culture - Urine, Urine, Clean Catch  -     CBC Auto Differential  -     Comprehensive Metabolic Panel  -     Amylase  -     Lipase    2. Fatigue, unspecified type  -     Vitamin B12  -     TSH  -     T4, Free    3. Vitamin D insufficiency  -     Vitamin D,25-Hydroxy    4. Lipid screening  -     Lipid Panel    5. Screening PSA (prostate specific antigen)  -     PSA SCREENING    6. LLQ pain  -     CT Abdomen Pelvis With Contrast    7. Left flank pain  -     CT Abdomen Pelvis With Contrast    1. Left-sided abdominal pain.  A comprehensive blood panel, including amylase and lipase, will be conducted. Additionally, a PSA screening will be conducted. A urine sample will be sent for culture. The patient is advised to contact the Gastroenterology department. Should the laboratory results be unremarkable, an ultrasound may be  considered to exclude the possibility of a hernia.    Return if symptoms worsen or fail to improve.    NEEMA Celaya  04/29/2024    Transcribed from ambient dictation for NEEMA Celaya by Marce Abraham.  04/29/24   08:36 EDT    Patient or patient representative verbalized consent to the visit recording.  I have personally performed the services described in this document as transcribed by the above individual, and it is both accurate and complete.

## 2024-04-30 LAB
25(OH)D3+25(OH)D2 SERPL-MCNC: 28.8 NG/ML (ref 30–100)
ALBUMIN SERPL-MCNC: 4.4 G/DL (ref 3.5–5.2)
ALBUMIN/GLOB SERPL: 1.5 G/DL
ALP SERPL-CCNC: 79 U/L (ref 39–117)
ALT SERPL-CCNC: 43 U/L (ref 1–41)
AMYLASE SERPL-CCNC: 51 U/L (ref 28–100)
AST SERPL-CCNC: 38 U/L (ref 1–40)
BASOPHILS # BLD AUTO: 0.04 10*3/MM3 (ref 0–0.2)
BASOPHILS NFR BLD AUTO: 0.7 % (ref 0–1.5)
BILIRUB SERPL-MCNC: 0.6 MG/DL (ref 0–1.2)
BUN SERPL-MCNC: 12 MG/DL (ref 6–20)
BUN/CREAT SERPL: 10.1 (ref 7–25)
CALCIUM SERPL-MCNC: 9.9 MG/DL (ref 8.6–10.5)
CHLORIDE SERPL-SCNC: 102 MMOL/L (ref 98–107)
CHOLEST SERPL-MCNC: 133 MG/DL (ref 0–200)
CO2 SERPL-SCNC: 26.5 MMOL/L (ref 22–29)
CREAT SERPL-MCNC: 1.19 MG/DL (ref 0.76–1.27)
EGFRCR SERPLBLD CKD-EPI 2021: 76.3 ML/MIN/1.73
EOSINOPHIL # BLD AUTO: 0.69 10*3/MM3 (ref 0–0.4)
EOSINOPHIL NFR BLD AUTO: 11.7 % (ref 0.3–6.2)
ERYTHROCYTE [DISTWIDTH] IN BLOOD BY AUTOMATED COUNT: 11.9 % (ref 12.3–15.4)
GLOBULIN SER CALC-MCNC: 2.9 GM/DL
GLUCOSE SERPL-MCNC: 87 MG/DL (ref 65–99)
HCT VFR BLD AUTO: 43.9 % (ref 37.5–51)
HDLC SERPL-MCNC: 36 MG/DL (ref 40–60)
HGB BLD-MCNC: 14.7 G/DL (ref 13–17.7)
IMM GRANULOCYTES # BLD AUTO: 0.01 10*3/MM3 (ref 0–0.05)
IMM GRANULOCYTES NFR BLD AUTO: 0.2 % (ref 0–0.5)
LDLC SERPL CALC-MCNC: 80 MG/DL (ref 0–100)
LIPASE SERPL-CCNC: 16 U/L (ref 13–60)
LYMPHOCYTES # BLD AUTO: 1.7 10*3/MM3 (ref 0.7–3.1)
LYMPHOCYTES NFR BLD AUTO: 28.7 % (ref 19.6–45.3)
MCH RBC QN AUTO: 30.7 PG (ref 26.6–33)
MCHC RBC AUTO-ENTMCNC: 33.5 G/DL (ref 31.5–35.7)
MCV RBC AUTO: 91.6 FL (ref 79–97)
MONOCYTES # BLD AUTO: 0.6 10*3/MM3 (ref 0.1–0.9)
MONOCYTES NFR BLD AUTO: 10.1 % (ref 5–12)
NEUTROPHILS # BLD AUTO: 2.88 10*3/MM3 (ref 1.7–7)
NEUTROPHILS NFR BLD AUTO: 48.6 % (ref 42.7–76)
NRBC BLD AUTO-RTO: 0 /100 WBC (ref 0–0.2)
PLATELET # BLD AUTO: 255 10*3/MM3 (ref 140–450)
POTASSIUM SERPL-SCNC: 4.8 MMOL/L (ref 3.5–5.2)
PROT SERPL-MCNC: 7.3 G/DL (ref 6–8.5)
PSA SERPL-MCNC: 0.84 NG/ML (ref 0–4)
RBC # BLD AUTO: 4.79 10*6/MM3 (ref 4.14–5.8)
SODIUM SERPL-SCNC: 139 MMOL/L (ref 136–145)
T4 FREE SERPL-MCNC: 1.15 NG/DL (ref 0.93–1.7)
TRIGL SERPL-MCNC: 85 MG/DL (ref 0–150)
TSH SERPL DL<=0.005 MIU/L-ACNC: 1.75 UIU/ML (ref 0.27–4.2)
VIT B12 SERPL-MCNC: 520 PG/ML (ref 211–946)
VLDLC SERPL CALC-MCNC: 17 MG/DL (ref 5–40)
WBC # BLD AUTO: 5.92 10*3/MM3 (ref 3.4–10.8)

## 2024-04-30 RX ORDER — ERGOCALCIFEROL 1.25 MG/1
50000 CAPSULE ORAL WEEKLY
Qty: 12 CAPSULE | Refills: 0 | Status: SHIPPED | OUTPATIENT
Start: 2024-04-30

## 2024-05-01 LAB
BACTERIA UR CULT: NO GROWTH
BACTERIA UR CULT: NORMAL

## 2024-06-13 ENCOUNTER — TELEMEDICINE (OUTPATIENT)
Dept: SLEEP MEDICINE | Facility: CLINIC | Age: 44
End: 2024-06-13
Payer: COMMERCIAL

## 2024-06-13 DIAGNOSIS — G47.33 OSA (OBSTRUCTIVE SLEEP APNEA): Primary | ICD-10-CM

## 2024-06-13 PROCEDURE — 99213 OFFICE O/P EST LOW 20 MIN: CPT | Performed by: NURSE PRACTITIONER

## 2024-06-14 NOTE — PROGRESS NOTES
[facility] Follow up    CHIEF COMPLAINT    LAW    HISTORY OF PRESENT ILLNESS    Will Gamez is a 44 y.o.male here today for his CPAP compliance check.  He was last seen in the office by me in November.    He had a home sleep study performed in December that showed severe obstructive sleep apnea.  He was started on CPAP and is here today for CPAP compliance check.    He denies any difficulty with the CPAP.  He is currently using a fullface mask.  He does feel well rested in the mornings.  He tries to wear his CPAP a minimum of 6 hours every night.    He denies any chest pain or palpitations.  Denies any lower extremity edema or calf tenderness.    He denies any reflux symptoms.    Patient Active Problem List   Diagnosis    LAW (obstructive sleep apnea)       No Known Allergies    Current Outpatient Medications:     cyclobenzaprine (FLEXERIL) 5 MG tablet, Take 1 tablet by mouth At Night As Needed for Muscle Spasms. (Patient not taking: Reported on 4/29/2024), Disp: 20 tablet, Rfl: 1    levoFLOXacin (Levaquin) 750 MG tablet, Take 1 tablet by mouth Daily., Disp: 7 tablet, Rfl: 0    oxyCODONE-acetaminophen (Percocet) 5-325 MG per tablet, Take 1 tablet by mouth Every 6 (Six) Hours As Needed for Severe Pain., Disp: 18 tablet, Rfl: 0    vitamin D (ERGOCALCIFEROL) 1.25 MG (23585 UT) capsule capsule, Take 1 capsule by mouth 1 (One) Time Per Week., Disp: 12 capsule, Rfl: 0  MEDICATION LIST AND ALLERGIES REVIEWED.    Social History     Tobacco Use    Smoking status: Never    Smokeless tobacco: Current     Types: Chew   Vaping Use    Vaping status: Never Used   Substance Use Topics    Alcohol use: Yes     Alcohol/week: 2.0 - 3.0 standard drinks of alcohol     Types: 2 - 3 Cans of beer per week     Comment: every other week    Drug use: No       FAMILY AND SOCIAL HISTORY REVIEWED.    Review of Systems   Constitutional:  Negative for activity change, appetite change, fatigue, fever and unexpected weight change.   HENT:   Negative for congestion, postnasal drip, rhinorrhea, sinus pressure, sore throat and voice change.    Eyes:  Negative for visual disturbance.   Respiratory:  Negative for cough, chest tightness, shortness of breath and wheezing.    Cardiovascular:  Negative for chest pain, palpitations and leg swelling.   Gastrointestinal:  Negative for abdominal distention, abdominal pain, nausea and vomiting.   Endocrine: Negative for cold intolerance and heat intolerance.   Genitourinary:  Negative for difficulty urinating and urgency.   Musculoskeletal:  Negative for arthralgias, back pain and neck pain.   Skin:  Negative for color change and pallor.   Allergic/Immunologic: Negative for environmental allergies and food allergies.   Neurological:  Negative for dizziness, syncope, weakness and light-headedness.   Hematological:  Negative for adenopathy. Does not bruise/bleed easily.   Psychiatric/Behavioral:  Negative for agitation and behavioral problems.    .    There were no vitals taken for this visit.    Immunization History   Administered Date(s) Administered    COVID-19 (PFIZER) Purple Cap Monovalent 01/26/2021, 02/19/2021    Hepatitis A 12/14/2018       Physical Exam  Unable to do physical exam due to telemedicine visit, patient was in no respiratory distress throughout the entire visit.     Meta Sleepiness Scale    Situation Chance of Dozing or Sleeping   Sitting and reading 0 - would never dose or sleep   Watching TV 1 - slight chance of dosing or sleeping   Sitting inactive in a public place 0 - would never dose or sleep   Being a passenger in a motor vehicle for an hour or more 0 - would never dose or sleep   Lying down in the afternoon 0 - would never dose or sleep   Sitting and talking to someone 0 - would never dose or sleep   Sitting quietly after lunch (no alcohol) 0 - would never dose or sleep   Stopped for a few minutes in traffic while driving 0 - would never dose or sleep   Total score (add the scores up) 1        CPAP DOWNLOAD    CPAP download: Patient is 90% compliant, average use is 6 hours and 25 minutes, he is on auto CPAP 4-18, average AHI is 2.8    PROBLEM LIST    Problem List Items Addressed This Visit          Sleep    LAW (obstructive sleep apnea) - Primary         DISCUSSION  The patient is located at their home address in [place], [place]. The patient presents today for telehealth service.  This service was conducted via audio/video technology through a secure [employer] video visit connection through [employer].  This provider is located in [facility].  Patient stated they are in a secure environment for the session.  Patient's condition being diagnosed/treated is appropriate for telemedicine.  The provider identified himself as well as his credentials.  The patient, and/or patient's guardian, consent to be seen remotely, and when consent is given they understanding that the consent allows for patient identifiable information to be sent to a third-party as needed.  They may refuse to be seen remotely at any time.  The electronic data is encrypted and password protected, and the patient and/or guardian has been advised of the potential risk to privacy not withstanding such measures.  Patient identifiers used: Name and date of birth.     Mr. Gamez was here for a CPAP compliance check.  We did review his CPAP download and he is compliant.  He does benefit from his CPAP and will continue to use this a minimum of 6 hours every night.  I will go ahead and renew his supplies for 1 year.    We discussed good sleep regimen such as laying in the lateral position, avoiding alcohol or sedatives prior to bedtime, regular exercise, weight loss, avoiding caffeine in the afternoons, going to bed and getting up at the same time every day.    He will follow-up in 1 year    I personally spent a total of 24 minutes on patient visit today including chart review, face to face with the patient obtaining the history and physical exam,  review of pertinent images and tests, counseling and discussion and/or coordination of care as described above, and documentation.  Total time excludes time spent on other separate services such as performing procedures or test interpretation, if applicable.        NEEMA Goddard  06/13/202410:06 EDT  Electronically signed     Please note that portions of this note were completed with a voice recognition program.        CC: Estefany Augustine, APRN

## 2024-07-23 ENCOUNTER — OFFICE VISIT (OUTPATIENT)
Dept: GASTROENTEROLOGY | Facility: CLINIC | Age: 44
End: 2024-07-23
Payer: COMMERCIAL

## 2024-07-23 ENCOUNTER — HOSPITAL ENCOUNTER (OUTPATIENT)
Dept: CT IMAGING | Facility: HOSPITAL | Age: 44
Discharge: HOME | End: 2024-07-23
Admitting: INTERNAL MEDICINE
Payer: COMMERCIAL

## 2024-07-23 VITALS
HEART RATE: 91 BPM | BODY MASS INDEX: 33.08 KG/M2 | DIASTOLIC BLOOD PRESSURE: 82 MMHG | OXYGEN SATURATION: 98 % | WEIGHT: 224 LBS | SYSTOLIC BLOOD PRESSURE: 118 MMHG

## 2024-07-23 DIAGNOSIS — R93.3 ABNORMAL FINDING ON GI TRACT IMAGING: ICD-10-CM

## 2024-07-23 DIAGNOSIS — R10.30 LOWER ABDOMINAL PAIN: ICD-10-CM

## 2024-07-23 DIAGNOSIS — K57.32 SIGMOID DIVERTICULITIS: ICD-10-CM

## 2024-07-23 DIAGNOSIS — R10.30 LOWER ABDOMINAL PAIN: Primary | ICD-10-CM

## 2024-07-23 DIAGNOSIS — K57.30 DIVERTICULOSIS OF COLON: ICD-10-CM

## 2024-07-23 DIAGNOSIS — Z12.11 COLON CANCER SCREENING: ICD-10-CM

## 2024-07-23 PROCEDURE — 74177 CT ABD & PELVIS W/CONTRAST: CPT

## 2024-07-23 PROCEDURE — 99204 OFFICE O/P NEW MOD 45 MIN: CPT | Performed by: INTERNAL MEDICINE

## 2024-07-23 PROCEDURE — 25510000001 IOPAMIDOL 61 % SOLUTION: Performed by: INTERNAL MEDICINE

## 2024-07-23 RX ORDER — AMOXICILLIN AND CLAVULANATE POTASSIUM 875; 125 MG/1; MG/1
1 TABLET, FILM COATED ORAL 2 TIMES DAILY
Qty: 20 TABLET | Refills: 0 | Status: SHIPPED | OUTPATIENT
Start: 2024-07-23 | End: 2024-08-02

## 2024-07-23 RX ORDER — METRONIDAZOLE 500 MG/1
500 TABLET ORAL 3 TIMES DAILY
Qty: 30 TABLET | Refills: 0 | Status: SHIPPED | OUTPATIENT
Start: 2024-07-23 | End: 2024-08-02

## 2024-07-23 RX ORDER — SODIUM, POTASSIUM,MAG SULFATES 17.5-3.13G
1 SOLUTION, RECONSTITUTED, ORAL ORAL EVERY 12 HOURS
Qty: 354 ML | Refills: 0 | Status: SHIPPED | OUTPATIENT
Start: 2024-07-23 | End: 2024-07-24

## 2024-07-23 RX ADMIN — IOPAMIDOL 98 ML: 612 INJECTION, SOLUTION INTRAVENOUS at 12:57

## 2024-07-23 NOTE — PROGRESS NOTES
New Patient Consult      Date: 2024   Patient Name: Will Gamez  MRN: [id number]  : 1980     Referring Physician: Estefany Augustine AP*    Chief Complaint   Patient presents with    Colon Cancer Screening       History of Present Illness: Will Gamez is a 44 y.o. male who is here today to establish care with Gastroenterology.    Was seen in the emergency room back in April, with lower abdominal pain, and was diagnosed with acute uncomplicated sigmoid diverticulitis.  This was treated with antibiotics.  He felt better.  This appointment was set up for a follow-up to talk about a colonoscopy.    He mentions today that he has been dealing with abdominal pain for the past 2 days, worse yesterday.  Located in the lower abdomen.  Wonders if he has another episode of diverticulitis.    Denies fever, chills.  Abdomen is tender but not distended.  No guarding or rigidity.    Subjective      History reviewed. No pertinent past medical history.    History reviewed. No pertinent surgical history.    Family History   Problem Relation Age of Onset    Hypertension Mother     Hypertension Brother        Social History     Socioeconomic History    Marital status:    Tobacco Use    Smoking status: Never    Smokeless tobacco: Current     Types: Chew   Vaping Use    Vaping status: Never Used   Substance and Sexual Activity    Alcohol use: Yes     Alcohol/week: 2.0 - 3.0 standard drinks of alcohol     Types: 2 - 3 Cans of beer per week     Comment: every other week    Drug use: No    Sexual activity: Defer         Current Outpatient Medications:     cyclobenzaprine (FLEXERIL) 5 MG tablet, Take 1 tablet by mouth At Night As Needed for Muscle Spasms., Disp: 20 tablet, Rfl: 1    amoxicillin-clavulanate (AUGMENTIN) 875-125 MG per tablet, Take 1 tablet by mouth 2 (Two) Times a Day for 10 days., Disp: 20 tablet, Rfl: 0    metroNIDAZOLE (Flagyl) 500 MG tablet, Take 1 tablet by mouth 3 (Three) Times a Day for 10  days., Disp: 30 tablet, Rfl: 0    sodium-potassium-magnesium sulfates (Suprep Bowel Prep Kit) 17.5-3.13-1.6 GM/177ML solution oral solution, Take 1 bottle by mouth Every 12 (Twelve) Hours for 1 day., Disp: 354 mL, Rfl: 0    No Known Allergies    Review of Systems   Constitutional:  Negative for unexpected weight loss.   HENT:  Negative for trouble swallowing.    Gastrointestinal:  Positive for abdominal pain. Negative for abdominal distention, anal bleeding, blood in stool, constipation, diarrhea, nausea, rectal pain, vomiting, GERD and indigestion.       The following portions of the patient's history were reviewed and updated as appropriate: allergies, current medications, past family history, past medical history, past social history, past surgical history and problem list.    Objective     Physical Exam:  Vitals:    07/23/24 1036   BP: 118/82   Pulse: 91   SpO2: 98%   Weight: 102 kg (224 lb)       Physical Exam  Constitutional:       General: He is not in acute distress.     Appearance: Normal appearance.   HENT:      Head: Normocephalic and atraumatic.      Mouth/Throat:      Mouth: Mucous membranes are moist.   Eyes:      General: No scleral icterus.     Conjunctiva/sclera: Conjunctivae normal.   Cardiovascular:      Rate and Rhythm: Normal rate.   Pulmonary:      Effort: Pulmonary effort is normal. No respiratory distress.   Abdominal:      General: There is no distension.      Tenderness: There is abdominal tenderness. There is no guarding or rebound.   Musculoskeletal:         General: No deformity or signs of injury.   Skin:     Coloration: Skin is not jaundiced or pale.   Neurological:      General: No focal deficit present.      Mental Status: He is alert and oriented to person, place, and time.   Psychiatric:         Mood and Affect: Mood normal.         Behavior: Behavior normal.         Results Review:   I have reviewed the patient's new clinical and imaging results and agree with the interpretation.      Office Visit on 04/29/2024   Component Date Value Ref Range Status    Color 04/29/2024 Yellow  Yellow, Straw, Dark Yellow, Destiny Final    Clarity, UA 04/29/2024 Clear  Clear Final    Specific Gravity  04/29/2024 1.030  1.005 - 1.030 Final    pH, Urine 04/29/2024 6.0  5.0 - 8.0 Final    Leukocytes 04/29/2024 Negative  Negative Final    Nitrite, UA 04/29/2024 Negative  Negative Final    Protein, POC 04/29/2024 Negative  Negative mg/dL Final    Glucose, UA 04/29/2024 Negative  Negative mg/dL Final    Ketones, UA 04/29/2024 Negative  Negative Final    Urobilinogen, UA 04/29/2024 Normal  Normal, 0.2 E.U./dL Final    Bilirubin 04/29/2024 Negative  Negative Final    Blood, UA 04/29/2024 Negative  Negative Final    Lot Number 04/29/2024 [id number]   Final    Expiration Date 04/29/2024 1/14/25   Final    Urine Culture 04/29/2024 Final report   Final    Result 1 04/29/2024 No growth   Final    Glucose 04/29/2024 87  65 - 99 mg/dL Final    BUN 04/29/2024 12  6 - 20 mg/dL Final    Creatinine 04/29/2024 1.19  0.76 - 1.27 mg/dL Final    EGFR Result 04/29/2024 76.3  >60.0 mL/min/1.73 Final    Comment: GFR Normal >60  Chronic Kidney Disease <60  Kidney Failure <15      BUN/Creatinine Ratio 04/29/2024 10.1  7.0 - 25.0 Final    Sodium 04/29/2024 139  136 - 145 mmol/L Final    Potassium 04/29/2024 4.8  3.5 - 5.2 mmol/L Final    Chloride 04/29/2024 102  98 - 107 mmol/L Final    Total CO2 04/29/2024 26.5  22.0 - 29.0 mmol/L Final    Calcium 04/29/2024 9.9  8.6 - 10.5 mg/dL Final    Total Protein 04/29/2024 7.3  6.0 - 8.5 g/dL Final    Albumin 04/29/2024 4.4  3.5 - 5.2 g/dL Final    Globulin 04/29/2024 2.9  gm/dL Final    A/G Ratio 04/29/2024 1.5  g/dL Final    Total Bilirubin 04/29/2024 0.6  0.0 - 1.2 mg/dL Final    Alkaline Phosphatase 04/29/2024 79  39 - 117 U/L Final    AST (SGOT) 04/29/2024 38  1 - 40 U/L Final    ALT (SGPT) 04/29/2024 43 (H)  1 - 41 U/L Final    Total Cholesterol 04/29/2024 133  0 - 200 mg/dL Final     Comment: Cholesterol Reference Ranges  (U.S. Department of Health and Human Services ATP III  Classifications)  Desirable          <200 mg/dL  Borderline High    200-239 mg/dL  High Risk          >240 mg/dL  Triglyceride Reference Ranges  (U.S. Department of Health and Human Services ATP III  Classifications)  Normal           <150 mg/dL  Borderline High  150-199 mg/dL  High             200-499 mg/dL  Very High        >500 mg/dL  HDL Reference Ranges  (U.S. Department of Health and Human Services ATP III  Classifications)  Low     <40 mg/dl (major risk factor for CHD)  High    >60 mg/dl ('negative' risk factor for CHD)  LDL Reference Ranges  (U.S. Department of Health and Human Services ATP III  Classifications)  Optimal          <100 mg/dL  Near Optimal     100-129 mg/dL  Borderline High  130-159 mg/dL  High             160-189 mg/dL  Very High        >189 mg/dL      Triglycerides 04/29/2024 85  0 - 150 mg/dL Final    HDL Cholesterol 04/29/2024 36 (L)  40 - 60 mg/dL Final    VLDL Cholesterol Spenser 04/29/2024 17  5 - 40 mg/dL Final    LDL Chol Calc (NIH) 04/29/2024 80  0 - 100 mg/dL Final    25 Hydroxy, Vitamin D 04/29/2024 28.8 (L)  30.0 - 100.0 ng/ml Final    Comment: Reference Range for Total Vitamin D 25(OH)  Deficiency <20.0 ng/mL  Insufficiency 21-29 ng/mL  Sufficiency  ng/mL  Toxicity >100 ng/ml      Vitamin B-12 04/29/2024 520  211 - 946 pg/mL Final    Results may be falsely increased if patient taking Biotin.    TSH 04/29/2024 1.750  0.270 - 4.200 uIU/mL Final    Free T4 04/29/2024 1.15  0.93 - 1.70 ng/dL Final    Results may be falsely increased if patient taking Biotin.    Amylase 04/29/2024 51  28 - 100 U/L Final    Lipase 04/29/2024 16  13 - 60 U/L Final    PSA 04/29/2024 0.838  0.000 - 4.000 ng/mL Final    Comment: Testing Method: Roche [employer] Electrochemiluminescence  Immunoassay(ECLIA)  Values obtained with different assay methods or kits cannot  be used interchangeably.      WBC 04/29/2024  5.92  3.40 - 10.80 10*3/mm3 Final    RBC 04/29/2024 4.79  4.14 - 5.80 10*6/mm3 Final    Hemoglobin 04/29/2024 14.7  13.0 - 17.7 g/dL Final    Hematocrit 04/29/2024 43.9  37.5 - 51.0 % Final    MCV 04/29/2024 91.6  79.0 - 97.0 fL Final    MCH 04/29/2024 30.7  26.6 - 33.0 pg Final    MCHC 04/29/2024 33.5  31.5 - 35.7 g/dL Final    RDW 04/29/2024 11.9 (L)  12.3 - 15.4 % Final    Platelets 04/29/2024 255  140 - 450 10*3/mm3 Final    Neutrophil Rel % 04/29/2024 48.6  42.7 - 76.0 % Final    Lymphocyte Rel % 04/29/2024 28.7  19.6 - 45.3 % Final    Monocyte Rel % 04/29/2024 10.1  5.0 - 12.0 % Final    Eosinophil Rel % 04/29/2024 11.7 (H)  0.3 - 6.2 % Final    Basophil Rel % 04/29/2024 0.7  0.0 - 1.5 % Final    Neutrophils Absolute 04/29/2024 2.88  1.70 - 7.00 10*3/mm3 Final    Lymphocytes Absolute 04/29/2024 1.70  0.70 - 3.10 10*3/mm3 Final    Monocytes Absolute 04/29/2024 0.60  0.10 - 0.90 10*3/mm3 Final    Eosinophils Absolute 04/29/2024 0.69 (H)  0.00 - 0.40 10*3/mm3 Final    Basophils Absolute 04/29/2024 0.04  0.00 - 0.20 10*3/mm3 Final    Immature Granulocyte Rel % 04/29/2024 0.2  0.0 - 0.5 % Final    Immature Grans Absolute 04/29/2024 0.01  0.00 - 0.05 10*3/mm3 Final    nRBC 04/29/2024 0.0  0.0 - 0.2 /100 WBC Final      CT Abdomen Pelvis With Contrast    Result Date: 4/29/2024  Acute uncomplicated sigmoid diverticulitis. Authenticated and Electronically Signed by Corky Eldridge M.D. on 04/29/2024 07:21:25 PM     Assessment / Plan      Assessment & Plan:  Diagnoses and all orders for this visit:    1. Lower abdominal pain (Primary)  -     Follow Anesthesia Guidelines / Protocol; Future  -     Obtain Informed Consent; Future  -     Case Request; Standing  -     Case Request  -     metroNIDAZOLE (Flagyl) 500 MG tablet; Take 1 tablet by mouth 3 (Three) Times a Day for 10 days.  Dispense: 30 tablet; Refill: 0  -     amoxicillin-clavulanate (AUGMENTIN) 875-125 MG per tablet; Take 1 tablet by mouth 2 (Two) Times a Day for 10  days.  Dispense: 20 tablet; Refill: 0  -     sodium-potassium-magnesium sulfates (Suprep Bowel Prep Kit) 17.5-3.13-1.6 GM/177ML solution oral solution; Take 1 bottle by mouth Every 12 (Twelve) Hours for 1 day.  Dispense: 354 mL; Refill: 0  -     CT Abdomen Pelvis With Contrast; Future    2. Sigmoid diverticulitis  -     Follow Anesthesia Guidelines / Protocol; Future  -     Obtain Informed Consent; Future  -     Case Request; Standing  -     Case Request  -     metroNIDAZOLE (Flagyl) 500 MG tablet; Take 1 tablet by mouth 3 (Three) Times a Day for 10 days.  Dispense: 30 tablet; Refill: 0  -     amoxicillin-clavulanate (AUGMENTIN) 875-125 MG per tablet; Take 1 tablet by mouth 2 (Two) Times a Day for 10 days.  Dispense: 20 tablet; Refill: 0  -     sodium-potassium-magnesium sulfates (Suprep Bowel Prep Kit) 17.5-3.13-1.6 GM/177ML solution oral solution; Take 1 bottle by mouth Every 12 (Twelve) Hours for 1 day.  Dispense: 354 mL; Refill: 0  -     CT Abdomen Pelvis With Contrast; Future    3. Abnormal finding on GI tract imaging  -     Follow Anesthesia Guidelines / Protocol; Future  -     Obtain Informed Consent; Future  -     Case Request; Standing  -     Case Request  -     metroNIDAZOLE (Flagyl) 500 MG tablet; Take 1 tablet by mouth 3 (Three) Times a Day for 10 days.  Dispense: 30 tablet; Refill: 0  -     amoxicillin-clavulanate (AUGMENTIN) 875-125 MG per tablet; Take 1 tablet by mouth 2 (Two) Times a Day for 10 days.  Dispense: 20 tablet; Refill: 0  -     sodium-potassium-magnesium sulfates (Suprep Bowel Prep Kit) 17.5-3.13-1.6 GM/177ML solution oral solution; Take 1 bottle by mouth Every 12 (Twelve) Hours for 1 day.  Dispense: 354 mL; Refill: 0  -     CT Abdomen Pelvis With Contrast; Future    4. Diverticulosis of colon  -     Follow Anesthesia Guidelines / Protocol; Future  -     Obtain Informed Consent; Future  -     Case Request; Standing  -     Case Request  -     metroNIDAZOLE (Flagyl) 500 MG tablet; Take 1  tablet by mouth 3 (Three) Times a Day for 10 days.  Dispense: 30 tablet; Refill: 0  -     amoxicillin-clavulanate (AUGMENTIN) 875-125 MG per tablet; Take 1 tablet by mouth 2 (Two) Times a Day for 10 days.  Dispense: 20 tablet; Refill: 0  -     sodium-potassium-magnesium sulfates (Suprep Bowel Prep Kit) 17.5-3.13-1.6 GM/177ML solution oral solution; Take 1 bottle by mouth Every 12 (Twelve) Hours for 1 day.  Dispense: 354 mL; Refill: 0  -     CT Abdomen Pelvis With Contrast; Future    5. Colon cancer screening  -     Follow Anesthesia Guidelines / Protocol; Future  -     Obtain Informed Consent; Future  -     Case Request; Standing  -     Case Request  -     metroNIDAZOLE (Flagyl) 500 MG tablet; Take 1 tablet by mouth 3 (Three) Times a Day for 10 days.  Dispense: 30 tablet; Refill: 0  -     amoxicillin-clavulanate (AUGMENTIN) 875-125 MG per tablet; Take 1 tablet by mouth 2 (Two) Times a Day for 10 days.  Dispense: 20 tablet; Refill: 0  -     sodium-potassium-magnesium sulfates (Suprep Bowel Prep Kit) 17.5-3.13-1.6 GM/177ML solution oral solution; Take 1 bottle by mouth Every 12 (Twelve) Hours for 1 day.  Dispense: 354 mL; Refill: 0  -     CT Abdomen Pelvis With Contrast; Future    Other orders  -     Follow Anesthesia Guidelines / Protocol; Standing  -     Verify NPO; Standing        Antibiotics for presumed diverticulitis.  Stat CT to rule out abscess formation/complicated diverticulitis.  Tentative plan for colonoscopy 6 weeks after.  If there is any significant complication on CT, he may need to go to the emergency room.  Above was communicated to the patient in detail and all of his questions were addressed.      Follow Up:   Return for Follow-up TBD after testing is reviewed.    Joshua Joel MD  [facility]    7/23/2024  11:54 EDT    Part of this note may be an electronic transcription/translation of spoken language to printed text using the Dragon Dictation System.

## 2024-07-24 ENCOUNTER — TELEPHONE (OUTPATIENT)
Dept: GASTROENTEROLOGY | Facility: CLINIC | Age: 44
End: 2024-07-24
Payer: COMMERCIAL

## 2024-07-24 PROBLEM — Z12.11 COLON CANCER SCREENING: Status: ACTIVE | Noted: 2024-07-23

## 2024-07-24 PROBLEM — R93.3 ABNORMAL FINDING ON GI TRACT IMAGING: Status: ACTIVE | Noted: 2024-07-23

## 2024-07-24 PROBLEM — K57.32 SIGMOID DIVERTICULITIS: Status: ACTIVE | Noted: 2024-07-23

## 2024-07-24 PROBLEM — K57.30 DIVERTICULOSIS OF COLON: Status: ACTIVE | Noted: 2024-07-23

## 2024-07-24 NOTE — TELEPHONE ENCOUNTER
----- Message from Ale ANDERSON sent at 7/24/2024  1:51 PM EDT -----    ----- Message -----  From: Joshua Joel MD  Sent: 7/24/2024   1:51 PM EDT  To: [facility]    Please give the patient the following message:  ----- Results -----  CAT scan reveals acute mid sigmoid colon uncomplicated diverticulitis.  No abscess formation noted.  Continue antibiotics as previously ordered during the office visit.

## 2024-07-24 NOTE — PROGRESS NOTES
Please give the patient the following message:  ----- Results -----  CAT scan reveals acute mid sigmoid colon uncomplicated diverticulitis.  No abscess formation noted.  Continue antibiotics as previously ordered during the office visit.

## 2024-08-28 NOTE — PRE-PROCEDURE INSTRUCTIONS
PAT phone history completed with pt for upcoming procedure on 8/29/24, with Dr. Joel.     PAT PASS GIVEN/REVIEWED WITH PT.  VERBALIZED UNDERSTANDING OF THE FOLLOWING:  DO NOT EAT, DRINK, SMOKE, USE SMOKELESS TOBACCO OR CHEW GUM AFTER MIDNIGHT THE NIGHT BEFORE SURGERY.  THIS ALSO INCLUDES HARD CANDIES AND MINTS.    DO NOT SHAVE THE AREA TO BE OPERATED ON AT LEAST 48 HOURS PRIOR TO THE PROCEDURE.  DO NOT WEAR MAKE UP OR NAIL POLISH.  DO NOT LEAVE IN ANY PIERCING OR WEAR JEWELRY THE DAY OF SURGERY.      DO NOT USE ADHESIVES IF YOU WEAR DENTURES.    DO NOT WEAR EYE CONTACTS; BRING IN YOUR GLASSES.    ONLY TAKE MEDICATION THE MORNING OF YOUR PROCEDURE IF INSTRUCTED BY YOUR SURGEON WITH ENOUGH WATER TO SWALLOW THE MEDICATION.  IF YOUR SURGEON DID NOT SPECIFY WHICH MEDICATIONS TO TAKE, YOU WILL NEED TO CALL THEIR OFFICE FOR FURTHER INSTRUCTIONS AND DO AS THEY INSTRUCT.    LEAVE ANYTHING YOU CONSIDER VALUABLE AT HOME.    YOU WILL NEED TO ARRANGE FOR SOMEONE TO DRIVE YOU HOME AFTER SURGERY.  IT IS RECOMMENDED THAT YOU DO NOT DRIVE, WORK, DRINK ALCOHOL OR MAKE MAJOR DECISIONS FOR AT LEAST 24 HOURS AFTER YOUR PROCEDURE IS COMPLETE.      THE DAY OF YOUR PROCEDURE, BRING IN THE FOLLOWING IF APPLICABLE:   PICTURE ID AND INSURANCE/MEDICARE OR MEDICAID CARDS/ANY CO-PAY THAT MAY BE DUE   COPY OF ADVANCED DIRECTIVE/LIVING WILL/POWER OR    CPAP/BIPAP/INHALERS   SKIN PREP SHEET   YOUR PREADMISSION TESTING PASS (IF NOT A PHONE HISTORY)    Medication instructions given to pt by RN per anesthesia protocol.  Pt referred back to surgeon for further instructions if he/she is on any blood thinners.

## 2024-08-29 ENCOUNTER — ANESTHESIA EVENT (OUTPATIENT)
Dept: GASTROENTEROLOGY | Facility: HOSPITAL | Age: 44
End: 2024-08-29
Payer: COMMERCIAL

## 2024-08-29 ENCOUNTER — ANESTHESIA (OUTPATIENT)
Dept: GASTROENTEROLOGY | Facility: HOSPITAL | Age: 44
End: 2024-08-29
Payer: COMMERCIAL

## 2024-08-29 ENCOUNTER — HOSPITAL ENCOUNTER (OUTPATIENT)
Facility: HOSPITAL | Age: 44
Setting detail: HOSPITAL OUTPATIENT SURGERY
Discharge: HOME | End: 2024-08-29
Attending: INTERNAL MEDICINE | Admitting: INTERNAL MEDICINE
Payer: COMMERCIAL

## 2024-08-29 VITALS
OXYGEN SATURATION: 97 % | TEMPERATURE: 96.9 F | RESPIRATION RATE: 20 BRPM | DIASTOLIC BLOOD PRESSURE: 72 MMHG | HEIGHT: 68 IN | HEART RATE: 76 BPM | SYSTOLIC BLOOD PRESSURE: 116 MMHG | BODY MASS INDEX: 30.31 KG/M2 | WEIGHT: 200 LBS

## 2024-08-29 DIAGNOSIS — K57.30 DIVERTICULOSIS OF COLON: ICD-10-CM

## 2024-08-29 DIAGNOSIS — Z12.11 COLON CANCER SCREENING: ICD-10-CM

## 2024-08-29 DIAGNOSIS — K57.32 SIGMOID DIVERTICULITIS: ICD-10-CM

## 2024-08-29 DIAGNOSIS — R93.3 ABNORMAL FINDING ON GI TRACT IMAGING: ICD-10-CM

## 2024-08-29 PROCEDURE — 25010000002 FENTANYL CITRATE (PF) 50 MCG/ML SOLUTION PREFILLED SYRINGE: Performed by: NURSE ANESTHETIST, CERTIFIED REGISTERED

## 2024-08-29 PROCEDURE — 25810000003 LACTATED RINGERS PER 1000 ML: Performed by: INTERNAL MEDICINE

## 2024-08-29 PROCEDURE — 25010000002 PROPOFOL 200 MG/20ML EMULSION: Performed by: NURSE ANESTHETIST, CERTIFIED REGISTERED

## 2024-08-29 PROCEDURE — 25010000002 ONDANSETRON PER 1 MG: Performed by: NURSE ANESTHETIST, CERTIFIED REGISTERED

## 2024-08-29 PROCEDURE — 45380 COLONOSCOPY AND BIOPSY: CPT | Performed by: INTERNAL MEDICINE

## 2024-08-29 PROCEDURE — 45385 COLONOSCOPY W/LESION REMOVAL: CPT | Performed by: INTERNAL MEDICINE

## 2024-08-29 RX ORDER — SODIUM CHLORIDE, SODIUM LACTATE, POTASSIUM CHLORIDE, CALCIUM CHLORIDE 600; 310; 30; 20 MG/100ML; MG/100ML; MG/100ML; MG/100ML
1000 INJECTION, SOLUTION INTRAVENOUS CONTINUOUS
Status: DISCONTINUED | OUTPATIENT
Start: 2024-08-29 | End: 2024-08-29 | Stop reason: HOSPADM

## 2024-08-29 RX ORDER — FENTANYL CITRATE 50 UG/ML
INJECTION, SOLUTION INTRAMUSCULAR; INTRAVENOUS AS NEEDED
Status: DISCONTINUED | OUTPATIENT
Start: 2024-08-29 | End: 2024-08-29 | Stop reason: SURG

## 2024-08-29 RX ORDER — SIMETHICONE 40MG/0.6ML
SUSPENSION, DROPS(FINAL DOSAGE FORM)(ML) ORAL AS NEEDED
Status: DISCONTINUED | OUTPATIENT
Start: 2024-08-29 | End: 2024-08-29 | Stop reason: HOSPADM

## 2024-08-29 RX ORDER — LIDOCAINE HCL/PF 100 MG/5ML
SYRINGE (ML) INJECTION AS NEEDED
Status: DISCONTINUED | OUTPATIENT
Start: 2024-08-29 | End: 2024-08-29 | Stop reason: SURG

## 2024-08-29 RX ORDER — SODIUM CHLORIDE 0.9 % (FLUSH) 0.9 %
10 SYRINGE (ML) INJECTION AS NEEDED
Status: DISCONTINUED | OUTPATIENT
Start: 2024-08-29 | End: 2024-08-29 | Stop reason: HOSPADM

## 2024-08-29 RX ORDER — ONDANSETRON 2 MG/ML
INJECTION INTRAMUSCULAR; INTRAVENOUS AS NEEDED
Status: DISCONTINUED | OUTPATIENT
Start: 2024-08-29 | End: 2024-08-29 | Stop reason: SURG

## 2024-08-29 RX ORDER — PROPOFOL 10 MG/ML
INJECTION, EMULSION INTRAVENOUS AS NEEDED
Status: DISCONTINUED | OUTPATIENT
Start: 2024-08-29 | End: 2024-08-29 | Stop reason: SURG

## 2024-08-29 RX ADMIN — PROPOFOL 125 MCG/KG/MIN: 10 INJECTION, EMULSION INTRAVENOUS at 09:55

## 2024-08-29 RX ADMIN — FENTANYL CITRATE 50 MCG: 50 INJECTION, SOLUTION INTRAMUSCULAR; INTRAVENOUS at 10:01

## 2024-08-29 RX ADMIN — Medication 100 MG: at 09:54

## 2024-08-29 RX ADMIN — PROPOFOL 150 MG: 10 INJECTION, EMULSION INTRAVENOUS at 09:54

## 2024-08-29 RX ADMIN — SODIUM CHLORIDE, POTASSIUM CHLORIDE, SODIUM LACTATE AND CALCIUM CHLORIDE 1000 ML: 600; 310; 30; 20 INJECTION, SOLUTION INTRAVENOUS at 08:49

## 2024-08-29 RX ADMIN — ONDANSETRON 4 MG: 2 INJECTION INTRAMUSCULAR; INTRAVENOUS at 10:12

## 2024-08-29 NOTE — ANESTHESIA POSTPROCEDURE EVALUATION
Patient: Will Gamez    Procedure Summary       Date: 08/29/24 Room / Location: [facility] / [facility]    Anesthesia Start: 0949 Anesthesia Stop: 1023    Procedure: COLONOSCOPY with biosy and polypectomy (Anus) Diagnosis:       Sigmoid diverticulitis      Abnormal finding on GI tract imaging      Diverticulosis of colon      Colon cancer screening      (Sigmoid diverticulitis [K57.32])      (Abnormal finding on GI tract imaging [R93.3])      (Diverticulosis of colon [K57.30])      (Colon cancer screening [Z12.11])    Surgeons: Joshua Joel MD Provider: Lyndsey Beth CRNA    Anesthesia Type: MAC ASA Status: 3            Anesthesia Type: MAC    Vitals  Vitals Value Taken Time   BP 97/66 08/29/24 1029   Temp 96.8 °F (36 °C) 08/29/24 1029   Pulse 73 08/29/24 1029   Resp 18 08/29/24 1029   SpO2 96 % 08/29/24 1029           Post Anesthesia Care and Evaluation    Patient location during evaluation: PHASE II  Patient participation: complete - patient participated  Level of consciousness: awake and alert  Pain score: 0  Pain management: satisfactory to patient    Airway patency: patent  Anesthetic complications: No anesthetic complications  PONV Status: none  Cardiovascular status: acceptable and stable  Respiratory status: acceptable  Hydration status: acceptable    Comments: Vitals signs as noted in nursing documentation as per protocol.

## 2024-08-29 NOTE — ANESTHESIA PREPROCEDURE EVALUATION
Anesthesia Evaluation     Patient summary reviewed and Nursing notes reviewed   no history of anesthetic complications:   NPO Solid Status: > 8 hours  NPO Liquid Status: > 8 hours           Airway   Mallampati: II  TM distance: >3 FB  Neck ROM: full  No difficulty expected  Dental - normal exam     Pulmonary - normal exam   (+) ,sleep apnea  (-) not a smoker  Cardiovascular - negative cardio ROS and normal exam  Exercise tolerance: good (4-7 METS)        Neuro/Psych- negative ROS  GI/Hepatic/Renal/Endo - negative ROS     Musculoskeletal (-) negative ROS    Abdominal    Substance History - negative use     OB/GYN          Other - negative ROS                   Anesthesia Plan    ASA 3     MAC     (Risks and benefits discussed including risk of aspiration, recall and dental damage. All patient questions answered. Will continue with POC.)  intravenous induction     Anesthetic plan, risks, benefits, and alternatives have been provided, discussed and informed consent has been obtained with: patient.    Plan discussed with CRNA.    CODE STATUS:

## 2024-08-29 NOTE — H&P
"     Pre Procedure History and Physical      Date of Procedure: 2024  Patient Name: Will Gamez  MRN: [id number]  : 1980     Referring provider: Joshua Joel MD    History of Present Illness: Patient here for scheduled outpatient endoscopy. See below and prior GI clinic documentation for further details.      Subjective     History reviewed. No pertinent past medical history.    History reviewed. No pertinent surgical history.    Family History   Problem Relation Age of Onset    Hypertension Mother     Hypertension Brother        Social History     Socioeconomic History    Marital status:    Tobacco Use    Smoking status: Never    Smokeless tobacco: Former     Types: Chew   Vaping Use    Vaping status: Never Used   Substance and Sexual Activity    Alcohol use: Yes     Alcohol/week: 2.0 - 3.0 standard drinks of alcohol     Types: 2 - 3 Cans of beer per week     Comment: every other week    Drug use: No    Sexual activity: Defer         Current Facility-Administered Medications:     lactated ringers infusion 1,000 mL, 1,000 mL, Intravenous, Continuous, Joshua Joel MD, Last Rate: 25 mL/hr at 24 0849, 1,000 mL at 24 0849    No Known Allergies    Review of Systems  Negative, except as below     The following portions of the patient's history were reviewed and updated as appropriate: allergies, current medications, past family history, past medical history, past social history, past surgical history and problem list.    Objective     Vitals:    24 0901 24 0834   BP:  143/81   BP Location:  Right arm   Patient Position:  Sitting   Pulse:  74   Resp:  15   Temp:  97 °F (36.1 °C)   TempSrc:  Temporal   SpO2:  98%   Weight: 90.7 kg (200 lb)    Height: 172.7 cm (68\")        Physical Exam  Constitutional:       General: He is not in acute distress.     Appearance: Normal appearance.   HENT:      Head: Normocephalic and atraumatic.      Mouth/Throat:      Mouth: " Mucous membranes are moist.   Eyes:      General: No scleral icterus.     Conjunctiva/sclera: Conjunctivae normal.   Cardiovascular:      Rate and Rhythm: Normal rate.   Pulmonary:      Effort: Pulmonary effort is normal. No respiratory distress.   Musculoskeletal:         General: No deformity or signs of injury.   Skin:     Coloration: Skin is not jaundiced or pale.   Neurological:      General: No focal deficit present.      Mental Status: He is alert and oriented to person, place, and time.   Psychiatric:         Mood and Affect: Mood normal.         Behavior: Behavior normal.           Assessment / Plan      Assessment/Recommendations:   Active Problems:    Sigmoid diverticulitis    Abnormal finding on GI tract imaging    Diverticulosis of colon    Colon cancer screening      Plan:  Colon    See GI clinic note/documentation for further details.      Joshua Joel MD  [facility]  8/29/2024  09:54 EDT    Part of this note may be an electronic transcription/translation of spoken language to printed text using the Dragon Dictation System.

## 2024-08-30 ENCOUNTER — TELEPHONE (OUTPATIENT)
Dept: GASTROENTEROLOGY | Facility: CLINIC | Age: 44
End: 2024-08-30
Payer: COMMERCIAL

## 2024-08-30 LAB — REF LAB TEST METHOD: NORMAL

## 2024-08-30 NOTE — PROGRESS NOTES
Please give the patient the following message:  ----- Results -----  The resected ascending colon and rectal polyps were noted to be tubular adenomas.  These are precancerous polyps.  They are not cancer.  Repeat colonoscopy is suggested in 3 years.

## 2024-08-30 NOTE — TELEPHONE ENCOUNTER
----- Message from Ale ANDERSON sent at 8/30/2024  2:10 PM EDT -----    ----- Message -----  From: Joshua Joel MD  Sent: 8/30/2024   2:03 PM EDT  To: [facility]    Please give the patient the following message:  ----- Results -----  The resected ascending colon and rectal polyps were noted to be tubular adenomas.  These are precancerous polyps.  They are not cancer.  Repeat colonoscopy is suggested in 3 years.

## 2024-08-31 ENCOUNTER — HOSPITAL ENCOUNTER (INPATIENT)
Facility: HOSPITAL | Age: 44
LOS: 5 days | Discharge: HOME | DRG: 391 | End: 2024-09-05
Attending: STUDENT IN AN ORGANIZED HEALTH CARE EDUCATION/TRAINING PROGRAM | Admitting: INTERNAL MEDICINE
Payer: COMMERCIAL

## 2024-08-31 ENCOUNTER — APPOINTMENT (OUTPATIENT)
Dept: CT IMAGING | Facility: HOSPITAL | Age: 44
DRG: 391 | End: 2024-08-31
Payer: COMMERCIAL

## 2024-08-31 DIAGNOSIS — K57.20 ABSCESS OF SIGMOID COLON DUE TO DIVERTICULITIS: ICD-10-CM

## 2024-08-31 DIAGNOSIS — K57.32 SIGMOID DIVERTICULITIS: Primary | ICD-10-CM

## 2024-08-31 LAB
ALBUMIN SERPL-MCNC: 4.3 G/DL (ref 3.5–5.2)
ALBUMIN/GLOB SERPL: 1.3 G/DL
ALP SERPL-CCNC: 79 U/L (ref 39–117)
ALT SERPL W P-5'-P-CCNC: 34 U/L (ref 1–41)
ANION GAP SERPL CALCULATED.3IONS-SCNC: 14.1 MMOL/L (ref 5–15)
AST SERPL-CCNC: 27 U/L (ref 1–40)
BASOPHILS # BLD AUTO: 0.05 10*3/MM3 (ref 0–0.2)
BASOPHILS NFR BLD AUTO: 0.4 % (ref 0–1.5)
BILIRUB SERPL-MCNC: 0.6 MG/DL (ref 0–1.2)
BILIRUB UR QL STRIP: NEGATIVE
BUN SERPL-MCNC: 10 MG/DL (ref 6–20)
BUN/CREAT SERPL: 8.8 (ref 7–25)
CALCIUM SPEC-SCNC: 9.9 MG/DL (ref 8.6–10.5)
CHLORIDE SERPL-SCNC: 101 MMOL/L (ref 98–107)
CLARITY UR: CLEAR
CO2 SERPL-SCNC: 22.9 MMOL/L (ref 22–29)
COLOR UR: YELLOW
CREAT SERPL-MCNC: 1.13 MG/DL (ref 0.76–1.27)
D-LACTATE SERPL-SCNC: 1.8 MMOL/L (ref 0.5–2)
D-LACTATE SERPL-SCNC: 2.1 MMOL/L (ref 0.5–2)
DEPRECATED RDW RBC AUTO: 40.7 FL (ref 37–54)
EGFRCR SERPLBLD CKD-EPI 2021: 81.2 ML/MIN/1.73
EOSINOPHIL # BLD AUTO: 0.23 10*3/MM3 (ref 0–0.4)
EOSINOPHIL NFR BLD AUTO: 1.8 % (ref 0.3–6.2)
ERYTHROCYTE [DISTWIDTH] IN BLOOD BY AUTOMATED COUNT: 12.4 % (ref 12.3–15.4)
GLOBULIN UR ELPH-MCNC: 3.2 GM/DL
GLUCOSE SERPL-MCNC: 102 MG/DL (ref 65–99)
GLUCOSE UR STRIP-MCNC: NEGATIVE MG/DL
HCT VFR BLD AUTO: 43 % (ref 37.5–51)
HGB BLD-MCNC: 15 G/DL (ref 13–17.7)
HGB UR QL STRIP.AUTO: NEGATIVE
HOLD SPECIMEN: NORMAL
HOLD SPECIMEN: NORMAL
IMM GRANULOCYTES # BLD AUTO: 0.05 10*3/MM3 (ref 0–0.05)
IMM GRANULOCYTES NFR BLD AUTO: 0.4 % (ref 0–0.5)
KETONES UR QL STRIP: NEGATIVE
LEUKOCYTE ESTERASE UR QL STRIP.AUTO: NEGATIVE
LIPASE SERPL-CCNC: 19 U/L (ref 13–60)
LYMPHOCYTES # BLD AUTO: 2.09 10*3/MM3 (ref 0.7–3.1)
LYMPHOCYTES NFR BLD AUTO: 16.2 % (ref 19.6–45.3)
MCH RBC QN AUTO: 31.6 PG (ref 26.6–33)
MCHC RBC AUTO-ENTMCNC: 34.9 G/DL (ref 31.5–35.7)
MCV RBC AUTO: 90.7 FL (ref 79–97)
MONOCYTES # BLD AUTO: 0.82 10*3/MM3 (ref 0.1–0.9)
MONOCYTES NFR BLD AUTO: 6.4 % (ref 5–12)
NEUTROPHILS NFR BLD AUTO: 74.8 % (ref 42.7–76)
NEUTROPHILS NFR BLD AUTO: 9.65 10*3/MM3 (ref 1.7–7)
NITRITE UR QL STRIP: NEGATIVE
NRBC BLD AUTO-RTO: 0 /100 WBC (ref 0–0.2)
PH UR STRIP.AUTO: >=9 [PH] (ref 5–8)
PLATELET # BLD AUTO: 195 10*3/MM3 (ref 140–450)
PMV BLD AUTO: 9.4 FL (ref 6–12)
POTASSIUM SERPL-SCNC: 3.9 MMOL/L (ref 3.5–5.2)
PROT SERPL-MCNC: 7.5 G/DL (ref 6–8.5)
PROT UR QL STRIP: NEGATIVE
RBC # BLD AUTO: 4.74 10*6/MM3 (ref 4.14–5.8)
SODIUM SERPL-SCNC: 138 MMOL/L (ref 136–145)
SP GR UR STRIP: 1.01 (ref 1–1.03)
UROBILINOGEN UR QL STRIP: ABNORMAL
WBC NRBC COR # BLD AUTO: 12.89 10*3/MM3 (ref 3.4–10.8)
WHOLE BLOOD HOLD COAG: NORMAL
WHOLE BLOOD HOLD SPECIMEN: NORMAL

## 2024-08-31 PROCEDURE — 25810000003 SODIUM CHLORIDE 0.9 % SOLUTION: Performed by: INTERNAL MEDICINE

## 2024-08-31 PROCEDURE — 93005 ELECTROCARDIOGRAM TRACING: CPT | Performed by: STUDENT IN AN ORGANIZED HEALTH CARE EDUCATION/TRAINING PROGRAM

## 2024-08-31 PROCEDURE — 99285 EMERGENCY DEPT VISIT HI MDM: CPT

## 2024-08-31 PROCEDURE — 25010000002 MORPHINE PER 10 MG: Performed by: STUDENT IN AN ORGANIZED HEALTH CARE EDUCATION/TRAINING PROGRAM

## 2024-08-31 PROCEDURE — 25510000001 IOPAMIDOL 61 % SOLUTION: Performed by: STUDENT IN AN ORGANIZED HEALTH CARE EDUCATION/TRAINING PROGRAM

## 2024-08-31 PROCEDURE — 25010000002 PIPERACILLIN SOD-TAZOBACTAM PER 1 G: Performed by: STUDENT IN AN ORGANIZED HEALTH CARE EDUCATION/TRAINING PROGRAM

## 2024-08-31 PROCEDURE — 25010000002 ONDANSETRON PER 1 MG: Performed by: STUDENT IN AN ORGANIZED HEALTH CARE EDUCATION/TRAINING PROGRAM

## 2024-08-31 PROCEDURE — 83690 ASSAY OF LIPASE: CPT | Performed by: STUDENT IN AN ORGANIZED HEALTH CARE EDUCATION/TRAINING PROGRAM

## 2024-08-31 PROCEDURE — 87040 BLOOD CULTURE FOR BACTERIA: CPT | Performed by: STUDENT IN AN ORGANIZED HEALTH CARE EDUCATION/TRAINING PROGRAM

## 2024-08-31 PROCEDURE — 36415 COLL VENOUS BLD VENIPUNCTURE: CPT

## 2024-08-31 PROCEDURE — 80053 COMPREHEN METABOLIC PANEL: CPT | Performed by: STUDENT IN AN ORGANIZED HEALTH CARE EDUCATION/TRAINING PROGRAM

## 2024-08-31 PROCEDURE — 81003 URINALYSIS AUTO W/O SCOPE: CPT | Performed by: STUDENT IN AN ORGANIZED HEALTH CARE EDUCATION/TRAINING PROGRAM

## 2024-08-31 PROCEDURE — 83605 ASSAY OF LACTIC ACID: CPT | Performed by: STUDENT IN AN ORGANIZED HEALTH CARE EDUCATION/TRAINING PROGRAM

## 2024-08-31 PROCEDURE — 25810000003 LACTATED RINGERS SOLUTION: Performed by: STUDENT IN AN ORGANIZED HEALTH CARE EDUCATION/TRAINING PROGRAM

## 2024-08-31 PROCEDURE — 85025 COMPLETE CBC W/AUTO DIFF WBC: CPT | Performed by: STUDENT IN AN ORGANIZED HEALTH CARE EDUCATION/TRAINING PROGRAM

## 2024-08-31 PROCEDURE — 74177 CT ABD & PELVIS W/CONTRAST: CPT

## 2024-08-31 PROCEDURE — 99222 1ST HOSP IP/OBS MODERATE 55: CPT | Performed by: INTERNAL MEDICINE

## 2024-08-31 PROCEDURE — 25010000002 MORPHINE PER 10 MG: Performed by: INTERNAL MEDICINE

## 2024-08-31 RX ORDER — SODIUM CHLORIDE 9 MG/ML
175 INJECTION, SOLUTION INTRAVENOUS CONTINUOUS
Status: DISCONTINUED | OUTPATIENT
Start: 2024-08-31 | End: 2024-09-02

## 2024-08-31 RX ORDER — NALOXONE HCL 0.4 MG/ML
0.4 VIAL (ML) INJECTION
Status: DISCONTINUED | OUTPATIENT
Start: 2024-08-31 | End: 2024-09-05 | Stop reason: HOSPADM

## 2024-08-31 RX ORDER — POLYETHYLENE GLYCOL 3350 17 G/17G
17 POWDER, FOR SOLUTION ORAL DAILY PRN
Status: DISCONTINUED | OUTPATIENT
Start: 2024-08-31 | End: 2024-09-05 | Stop reason: HOSPADM

## 2024-08-31 RX ORDER — ONDANSETRON 2 MG/ML
4 INJECTION INTRAMUSCULAR; INTRAVENOUS EVERY 6 HOURS PRN
Status: DISCONTINUED | OUTPATIENT
Start: 2024-08-31 | End: 2024-09-05 | Stop reason: HOSPADM

## 2024-08-31 RX ORDER — BISACODYL 10 MG
10 SUPPOSITORY, RECTAL RECTAL DAILY PRN
Status: DISCONTINUED | OUTPATIENT
Start: 2024-08-31 | End: 2024-09-05 | Stop reason: HOSPADM

## 2024-08-31 RX ORDER — SODIUM CHLORIDE 9 MG/ML
40 INJECTION, SOLUTION INTRAVENOUS AS NEEDED
Status: DISCONTINUED | OUTPATIENT
Start: 2024-08-31 | End: 2024-09-05 | Stop reason: HOSPADM

## 2024-08-31 RX ORDER — ONDANSETRON 2 MG/ML
4 INJECTION INTRAMUSCULAR; INTRAVENOUS ONCE
Status: COMPLETED | OUTPATIENT
Start: 2024-08-31 | End: 2024-08-31

## 2024-08-31 RX ORDER — BISACODYL 5 MG/1
5 TABLET, DELAYED RELEASE ORAL DAILY PRN
Status: DISCONTINUED | OUTPATIENT
Start: 2024-08-31 | End: 2024-09-05 | Stop reason: HOSPADM

## 2024-08-31 RX ORDER — MORPHINE SULFATE 2 MG/ML
2 INJECTION, SOLUTION INTRAMUSCULAR; INTRAVENOUS EVERY 4 HOURS PRN
Status: DISCONTINUED | OUTPATIENT
Start: 2024-08-31 | End: 2024-09-03 | Stop reason: DRUGHIGH

## 2024-08-31 RX ORDER — SODIUM CHLORIDE 0.9 % (FLUSH) 0.9 %
10 SYRINGE (ML) INJECTION AS NEEDED
Status: DISCONTINUED | OUTPATIENT
Start: 2024-08-31 | End: 2024-09-05 | Stop reason: HOSPADM

## 2024-08-31 RX ORDER — AMOXICILLIN 250 MG
2 CAPSULE ORAL 2 TIMES DAILY PRN
Status: DISCONTINUED | OUTPATIENT
Start: 2024-08-31 | End: 2024-09-05 | Stop reason: HOSPADM

## 2024-08-31 RX ORDER — IOPAMIDOL 612 MG/ML
100 INJECTION, SOLUTION INTRAVASCULAR
Status: COMPLETED | OUTPATIENT
Start: 2024-08-31 | End: 2024-08-31

## 2024-08-31 RX ORDER — SODIUM CHLORIDE 0.9 % (FLUSH) 0.9 %
10 SYRINGE (ML) INJECTION EVERY 12 HOURS SCHEDULED
Status: DISCONTINUED | OUTPATIENT
Start: 2024-08-31 | End: 2024-09-05 | Stop reason: HOSPADM

## 2024-08-31 RX ADMIN — SODIUM CHLORIDE, POTASSIUM CHLORIDE, SODIUM LACTATE AND CALCIUM CHLORIDE 1000 ML: 600; 310; 30; 20 INJECTION, SOLUTION INTRAVENOUS at 18:09

## 2024-08-31 RX ADMIN — PIPERACILLIN AND TAZOBACTAM 4.5 G: 4; .5 INJECTION, POWDER, FOR SOLUTION INTRAVENOUS at 20:07

## 2024-08-31 RX ADMIN — Medication 10 ML: at 21:19

## 2024-08-31 RX ADMIN — IOPAMIDOL 100 ML: 612 INJECTION, SOLUTION INTRAVENOUS at 18:31

## 2024-08-31 RX ADMIN — SODIUM CHLORIDE 125 ML/HR: 9 INJECTION, SOLUTION INTRAVENOUS at 21:32

## 2024-08-31 RX ADMIN — MORPHINE SULFATE 2 MG: 2 INJECTION, SOLUTION INTRAMUSCULAR; INTRAVENOUS at 21:12

## 2024-08-31 RX ADMIN — MORPHINE SULFATE 4 MG: 4 INJECTION, SOLUTION INTRAMUSCULAR; INTRAVENOUS at 18:09

## 2024-08-31 RX ADMIN — MORPHINE SULFATE 4 MG: 4 INJECTION, SOLUTION INTRAMUSCULAR; INTRAVENOUS at 23:35

## 2024-08-31 RX ADMIN — ONDANSETRON 4 MG: 2 INJECTION INTRAMUSCULAR; INTRAVENOUS at 18:09

## 2024-08-31 NOTE — ED PROVIDER NOTES
[facility]  Emergency Department Encounter  Emergency Medicine Physician Note       Pt Name: Will Gamez  MRN: [id number]  Pt :   1980  Room Number:  321/1  Date of encounter:  2024  PCP: Estefany Augustine APRN  ED Provider: Gil Lanza MD    Historian: Patient      HPI:  Chief Complaint: Abdominal pain        Context: Will Gamez is a 44 y.o. male who presents to the ED for abdominal pain.  Symptoms started over the past few days.  States it is mostly lower abdomen and right lower quadrant.  Reports worsened this morning.  He has associated nausea.  No fevers but has associated chills.  He had colonoscopy performed 2 days ago.      PAST MEDICAL HISTORY  History reviewed. No pertinent past medical history.      PAST SURGICAL HISTORY  Past Surgical History:   Procedure Laterality Date    COLONOSCOPY N/A 2024    Procedure: COLONOSCOPY with biosy and polypectomy;  Surgeon: Joshua Joel MD;  Location: [facility] ENDOSCOPY;  Service: Gastroenterology;  Laterality: N/A;         FAMILY HISTORY  Family History   Problem Relation Age of Onset    Hypertension Mother     Hypertension Brother          SOCIAL HISTORY  Social History     Socioeconomic History    Marital status:    Tobacco Use    Smoking status: Never    Smokeless tobacco: Former     Types: Chew   Vaping Use    Vaping status: Never Used   Substance and Sexual Activity    Alcohol use: Yes     Alcohol/week: 2.0 - 3.0 standard drinks of alcohol     Types: 2 - 3 Cans of beer per week     Comment: every other week    Drug use: No    Sexual activity: Defer         ALLERGIES  Patient has no known allergies.        REVIEW OF SYSTEMS  Systems reviewed and negative      PHYSICAL EXAM    I have reviewed the triage vital signs and nursing notes.    ED Triage Vitals [24 1617]   Temp Heart Rate Resp BP SpO2   97.8 °F (36.6 °C) 82 18 91/64 100 %      Temp src Heart Rate Source Patient  Position BP Location FiO2 (%)   -- -- -- -- --       Physical Exam  Constitutional:       General: He is not in acute distress.  HENT:      Head: Normocephalic.   Cardiovascular:      Rate and Rhythm: Normal rate.      Pulses: Normal pulses.   Pulmonary:      Effort: Pulmonary effort is normal. No respiratory distress.   Abdominal:      Tenderness:  in the right lower quadrant and suprapubic area   Musculoskeletal:         General: No deformity.      Cervical back: Neck supple.   Skin:     General: Skin is warm.   Neurological:      General: No focal deficit present.      Mental Status: He is alert.         LAB RESULTS  Recent Results (from the past 24 hour(s))   Comprehensive Metabolic Panel    Collection Time: 08/31/24  5:00 PM    Specimen: Blood   Result Value Ref Range    Glucose 102 (H) 65 - 99 mg/dL    BUN 10 6 - 20 mg/dL    Creatinine 1.13 0.76 - 1.27 mg/dL    Sodium 138 136 - 145 mmol/L    Potassium 3.9 3.5 - 5.2 mmol/L    Chloride 101 98 - 107 mmol/L    CO2 22.9 22.0 - 29.0 mmol/L    Calcium 9.9 8.6 - 10.5 mg/dL    Total Protein 7.5 6.0 - 8.5 g/dL    Albumin 4.3 3.5 - 5.2 g/dL    ALT (SGPT) 34 1 - 41 U/L    AST (SGOT) 27 1 - 40 U/L    Alkaline Phosphatase 79 39 - 117 U/L    Total Bilirubin 0.6 0.0 - 1.2 mg/dL    Globulin 3.2 gm/dL    A/G Ratio 1.3 g/dL    BUN/Creatinine Ratio 8.8 7.0 - 25.0    Anion Gap 14.1 5.0 - 15.0 mmol/L    eGFR 81.2 >60.0 mL/min/1.73   Lipase    Collection Time: 08/31/24  5:00 PM    Specimen: Blood   Result Value Ref Range    Lipase 19 13 - 60 U/L   Green Top (Gel)    Collection Time: 08/31/24  5:00 PM   Result Value Ref Range    Extra Tube Hold for add-ons.    Lavender Top    Collection Time: 08/31/24  5:00 PM   Result Value Ref Range    Extra Tube hold for add-on    Gold Top - SST    Collection Time: 08/31/24  5:00 PM   Result Value Ref Range    Extra Tube Hold for add-ons.    Light Blue Top    Collection Time: 08/31/24  5:00 PM   Result Value Ref Range    Extra Tube Hold for  add-ons.    CBC Auto Differential    Collection Time: 08/31/24  5:00 PM    Specimen: Blood   Result Value Ref Range    WBC 12.89 (H) 3.40 - 10.80 10*3/mm3    RBC 4.74 4.14 - 5.80 10*6/mm3    Hemoglobin 15.0 13.0 - 17.7 g/dL    Hematocrit 43.0 37.5 - 51.0 %    MCV 90.7 79.0 - 97.0 fL    MCH 31.6 26.6 - 33.0 pg    MCHC 34.9 31.5 - 35.7 g/dL    RDW 12.4 12.3 - 15.4 %    RDW-SD 40.7 37.0 - 54.0 fl    MPV 9.4 6.0 - 12.0 fL    Platelets 195 140 - 450 10*3/mm3    Neutrophil % 74.8 42.7 - 76.0 %    Lymphocyte % 16.2 (L) 19.6 - 45.3 %    Monocyte % 6.4 5.0 - 12.0 %    Eosinophil % 1.8 0.3 - 6.2 %    Basophil % 0.4 0.0 - 1.5 %    Immature Grans % 0.4 0.0 - 0.5 %    Neutrophils, Absolute 9.65 (H) 1.70 - 7.00 10*3/mm3    Lymphocytes, Absolute 2.09 0.70 - 3.10 10*3/mm3    Monocytes, Absolute 0.82 0.10 - 0.90 10*3/mm3    Eosinophils, Absolute 0.23 0.00 - 0.40 10*3/mm3    Basophils, Absolute 0.05 0.00 - 0.20 10*3/mm3    Immature Grans, Absolute 0.05 0.00 - 0.05 10*3/mm3    nRBC 0.0 0.0 - 0.2 /100 WBC   Urinalysis With Microscopic If Indicated (No Culture) - Urine, Clean Catch    Collection Time: 08/31/24  6:03 PM    Specimen: Urine, Clean Catch   Result Value Ref Range    Color, UA Yellow Yellow, Straw    Appearance, UA Clear Clear    pH, UA >=9.0 (H) 5.0 - 8.0    Specific Gravity, UA 1.014 1.005 - 1.030    Glucose, UA Negative Negative    Ketones, UA Negative Negative    Bilirubin, UA Negative Negative    Blood, UA Negative Negative    Protein, UA Negative Negative    Leuk Esterase, UA Negative Negative    Nitrite, UA Negative Negative    Urobilinogen, UA 0.2 E.U./dL 0.2 - 1.0 E.U./dL   Lactic Acid, Plasma    Collection Time: 08/31/24  7:29 PM    Specimen: Blood   Result Value Ref Range    Lactate 2.1 (C) 0.5 - 2.0 mmol/L   STAT Lactic Acid, Reflex    Collection Time: 08/31/24 10:17 PM    Specimen: Blood   Result Value Ref Range    Lactate 1.8 0.5 - 2.0 mmol/L       If labs were ordered, I independently reviewed the results and  considered them in treating the patient.        RADIOLOGY  CT Abdomen Pelvis With Contrast    Addendum Date: 8/31/2024    ADDENDUM REPORT ADDENDUM: This report was discussed with Cait Winn RN on Aug 31, 2024 19:15:00 EDT. Authenticated and Electronically Signed by Laura Gilliland MD on 08/31/2024 07:15:32 PM    Result Date: 8/31/2024  FINAL REPORT TECHNIQUE: null CLINICAL HISTORY: Lower abd, RLQ abd pain, eval diverticulitis, colitis, kidney stone, appendici COMPARISON: null FINDINGS: CT abdomen and pelvis with contrast Comparison: None Findings: Mild dependent changes at the lung bases. Unremarkable gallbladder and solid organs. No urolithiasis. 3.1 x 1.8 x 1.2 cm abscess containing fluid and a pocket of gas within the mesentery of the right hemipelvis. There is severe edema of the adjacent mesentery. There is edema of adjacent portions of the sigmoid colon and distal ileum. There is edema of a sigmoidal diverticulum and several additional tiny pockets of mesenteric gas are present interposed between the abscess and the sigmoid colon. The proximal appendix is unremarkable, but the distal appendix extends into the area of edema and does not well  visualized. There is an umbilical hernia containing fat. Unremarkable prostate gland and urinary bladder. Trace pelvic free fluid. No acute fracture.     IMPRESSION: 3.1 cm abscess and several tiny pockets of mesenteric gas within the mesentery of the right hemipelvis. This is most likely on the basis of perforated diverticulitis of the sigmoid colon, but appendix tip perforation is not completely excluded. There is secondary edema of an adjacent portion of the distal ileum. Authenticated and Electronically Signed by Laura Gilliland MD on 08/31/2024 07:10:08 PM     PROCEDURES    Procedures    ECG 12 Lead Other; dizziness   Final Result          MEDICATIONS GIVEN IN ER    Medications   sodium chloride 0.9 % flush 10 mL (has no administration in time range)   sodium  chloride 0.9 % flush 10 mL (10 mL Intravenous Given 8/31/24 2119)   sodium chloride 0.9 % flush 10 mL (has no administration in time range)   sodium chloride 0.9 % infusion 40 mL (has no administration in time range)   sennosides-docusate (PERICOLACE) 8.6-50 MG per tablet 2 tablet (has no administration in time range)     And   polyethylene glycol (MIRALAX) packet 17 g (has no administration in time range)     And   bisacodyl (DULCOLAX) EC tablet 5 mg (has no administration in time range)     And   bisacodyl (DULCOLAX) suppository 10 mg (has no administration in time range)   ondansetron (ZOFRAN) injection 4 mg (has no administration in time range)   sodium chloride 0.9 % infusion (125 mL/hr Intravenous New Bag 8/31/24 2132)   Morphine sulfate (PF) injection 2 mg (2 mg Intravenous Given 8/31/24 2112)     And   naloxone (NARCAN) injection 0.4 mg (has no administration in time range)   morphine injection 4 mg (4 mg Intravenous Given 8/31/24 1809)   ondansetron (ZOFRAN) injection 4 mg (4 mg Intravenous Given 8/31/24 1809)   lactated ringers bolus 1,000 mL (1,000 mL Intravenous New Bag 8/31/24 1809)   iopamidol (ISOVUE-300) 61 % injection 100 mL (100 mL Intravenous Given 8/31/24 1831)   piperacillin-tazobactam (ZOSYN) IVPB 4.5 g IVPB in 100 mL NS (VTB) (0 g Intravenous Stopped 8/31/24 2045)   morphine injection 4 mg (4 mg Intravenous Given 8/31/24 2335)         MEDICAL DECISION MAKING, PROGRESS, and CONSULTS    All labs, if obtained, have been independently reviewed by me.  All radiology studies, if obtained, have been reviewed by me and the radiologist dictating the report.  All EKG's, if obtained, have been independently viewed and interpreted by me.      Discussion below represents my analysis of pertinent findings related to patient's condition, differential diagnosis, treatment plan and final disposition.                         Differential diagnosis:    Appendicitis, diverticulitis, abscess, UTI, kidney stone,  others.      Additional sources:    - Discussed/ obtained information from independent historians: Family member at bedside    - External (non-ED) record review: Colonoscopy report 8/29/2024    - Chronic or social conditions impacting care:      - Shared decision making:        Orders placed during this visit:  Orders Placed This Encounter   Procedures    Blood Culture - Blood,    Blood Culture - Blood,    CT Abdomen Pelvis With Contrast    [place] Draw    Comprehensive Metabolic Panel    Lipase    Urinalysis With Microscopic If Indicated (No Culture) - Urine, Clean Catch    CBC Auto Differential    Lactic Acid, Plasma    STAT Lactic Acid, Reflex    Comprehensive Metabolic Panel    CBC (No Diff)    NPO Diet NPO Type: Strict NPO    Undress & Gown    Vital Signs    Intake & Output    Weigh Patient    Oral Care    Saline Lock & Maintain IV Access    Place Sequential Compression Device    Maintain Sequential Compression Device    Activity - Ad Maria A    Opioid Administration - Document EtCO2 and / or SpO2 With Each Set of Vitals & Any Change in Patient Status    Opioid Administration - Notify Provider Hypercapnic Monitoring    Opioid Administration - Continuous Pulse Oximetry (SpO2)    Code Status and Medical Interventions: CPR (Attempt to Resuscitate); Full Support    ECG 12 Lead Other; dizziness    Insert Peripheral IV    Insert Peripheral IV    Inpatient Admission    CBC & Differential    Green Top (Gel)    Lavender Top    Gold Top - SST    Light Blue Top         Additional orders considered but not ordered:      ED Course/MDM Discussion:    Patient is a 44-year-old male who presented for lower abdominal pain.  He is in no acute distress.  Vital signs are reassuring.  He has a mild leukocytosis of 12.89.  He has a mild lactic acidosis of 2.1.  CT imaging obtained shows fat stranding in the lower pelvis about the sigmoid on my interpretation of imaging.  Radiology interpretation notes pelvic abscess likely secondary to  perforated sigmoid diverticulitis versus less likely appendicitis.  Patient started empirically on Zosyn.  Blood cultures ordered.  Discussed with Dr. Moran who states n.p.o. after midnight and recommendations for hospital medicine admission.  Discussed with Dr. Escalante for admission.                    Consultants:    Hospitalist  Dr. Moran general surgery    Shared Decision Making:  After my consideration of clinical presentation and any laboratory/radiology studies obtained, I discussed the findings with the patient/patient representative who is in agreement with the treatment plan and the final disposition.   Risks and benefits of discharge and/or observation/admission were discussed.         AS OF 23:46 EDT VITALS:    BP - 101/62  HR - 83  TEMP - 99.1 °F (37.3 °C) (Oral)  O2 SATS - 95%                  DIAGNOSIS  Final diagnoses:   Sigmoid diverticulitis         DISPOSITION  ED Disposition       ED Disposition   Decision to Admit    Condition   --    Comment   Level of Care: Med/Surg [1]   Diagnosis: Abscess of sigmoid colon due to diverticulitis [0251494]   Admitting Physician: JOSE ESCALANTE [[id number]]   Certification: I Certify That Inpatient Hospital Services Are Medically Necessary For Greater Than 2 Midnights                     Please note that portions of this document were completed with voice recognition software.        Gil Lanza MD  08/31/24 3640

## 2024-09-01 LAB
ALBUMIN SERPL-MCNC: 3.7 G/DL (ref 3.5–5.2)
ALBUMIN/GLOB SERPL: 1.3 G/DL
ALP SERPL-CCNC: 64 U/L (ref 39–117)
ALT SERPL W P-5'-P-CCNC: 26 U/L (ref 1–41)
ANION GAP SERPL CALCULATED.3IONS-SCNC: 11.3 MMOL/L (ref 5–15)
AST SERPL-CCNC: 20 U/L (ref 1–40)
BILIRUB SERPL-MCNC: 1.4 MG/DL (ref 0–1.2)
BUN SERPL-MCNC: 11 MG/DL (ref 6–20)
BUN/CREAT SERPL: 8.7 (ref 7–25)
CALCIUM SPEC-SCNC: 9.2 MG/DL (ref 8.6–10.5)
CHLORIDE SERPL-SCNC: 104 MMOL/L (ref 98–107)
CO2 SERPL-SCNC: 21.7 MMOL/L (ref 22–29)
CREAT SERPL-MCNC: 1.27 MG/DL (ref 0.76–1.27)
D-LACTATE SERPL-SCNC: 0.8 MMOL/L (ref 0.5–2)
DEPRECATED RDW RBC AUTO: 42.8 FL (ref 37–54)
EGFRCR SERPLBLD CKD-EPI 2021: 70.6 ML/MIN/1.73
ERYTHROCYTE [DISTWIDTH] IN BLOOD BY AUTOMATED COUNT: 12.6 % (ref 12.3–15.4)
GLOBULIN UR ELPH-MCNC: 2.9 GM/DL
GLUCOSE SERPL-MCNC: 104 MG/DL (ref 65–99)
HCT VFR BLD AUTO: 39.9 % (ref 37.5–51)
HGB BLD-MCNC: 13.8 G/DL (ref 13–17.7)
LDH SERPL-CCNC: 129 U/L (ref 135–225)
MCH RBC QN AUTO: 31.9 PG (ref 26.6–33)
MCHC RBC AUTO-ENTMCNC: 34.6 G/DL (ref 31.5–35.7)
MCV RBC AUTO: 92.1 FL (ref 79–97)
PLATELET # BLD AUTO: 169 10*3/MM3 (ref 140–450)
PMV BLD AUTO: 9.2 FL (ref 6–12)
POTASSIUM SERPL-SCNC: 3.9 MMOL/L (ref 3.5–5.2)
PROT SERPL-MCNC: 6.6 G/DL (ref 6–8.5)
RBC # BLD AUTO: 4.33 10*6/MM3 (ref 4.14–5.8)
SODIUM SERPL-SCNC: 137 MMOL/L (ref 136–145)
WBC NRBC COR # BLD AUTO: 13.4 10*3/MM3 (ref 3.4–10.8)

## 2024-09-01 PROCEDURE — 25010000002 KETOROLAC TROMETHAMINE PER 15 MG: Performed by: INTERNAL MEDICINE

## 2024-09-01 PROCEDURE — 25010000002 PIPERACILLIN SOD-TAZOBACTAM PER 1 G: Performed by: INTERNAL MEDICINE

## 2024-09-01 PROCEDURE — 83605 ASSAY OF LACTIC ACID: CPT | Performed by: INTERNAL MEDICINE

## 2024-09-01 PROCEDURE — 25810000003 SODIUM CHLORIDE 0.9 % SOLUTION: Performed by: INTERNAL MEDICINE

## 2024-09-01 PROCEDURE — 83615 LACTATE (LD) (LDH) ENZYME: CPT | Performed by: INTERNAL MEDICINE

## 2024-09-01 PROCEDURE — 25010000002 MORPHINE PER 10 MG: Performed by: INTERNAL MEDICINE

## 2024-09-01 PROCEDURE — 80053 COMPREHEN METABOLIC PANEL: CPT | Performed by: INTERNAL MEDICINE

## 2024-09-01 PROCEDURE — 85027 COMPLETE CBC AUTOMATED: CPT | Performed by: INTERNAL MEDICINE

## 2024-09-01 PROCEDURE — 99222 1ST HOSP IP/OBS MODERATE 55: CPT | Performed by: SURGERY

## 2024-09-01 PROCEDURE — 99232 SBSQ HOSP IP/OBS MODERATE 35: CPT | Performed by: INTERNAL MEDICINE

## 2024-09-01 RX ORDER — KETOROLAC TROMETHAMINE 30 MG/ML
30 INJECTION, SOLUTION INTRAMUSCULAR; INTRAVENOUS EVERY 6 HOURS PRN
Status: DISCONTINUED | OUTPATIENT
Start: 2024-09-01 | End: 2024-09-04

## 2024-09-01 RX ORDER — METRONIDAZOLE 500 MG/100ML
500 INJECTION, SOLUTION INTRAVENOUS EVERY 8 HOURS
Status: DISCONTINUED | OUTPATIENT
Start: 2024-09-01 | End: 2024-09-01

## 2024-09-01 RX ORDER — KETOROLAC TROMETHAMINE 30 MG/ML
30 INJECTION, SOLUTION INTRAMUSCULAR; INTRAVENOUS ONCE AS NEEDED
Status: COMPLETED | OUTPATIENT
Start: 2024-09-01 | End: 2024-09-01

## 2024-09-01 RX ADMIN — KETOROLAC TROMETHAMINE 30 MG: 30 INJECTION, SOLUTION INTRAMUSCULAR; INTRAVENOUS at 14:08

## 2024-09-01 RX ADMIN — MORPHINE SULFATE 2 MG: 2 INJECTION, SOLUTION INTRAMUSCULAR; INTRAVENOUS at 12:30

## 2024-09-01 RX ADMIN — KETOROLAC TROMETHAMINE 30 MG: 30 INJECTION, SOLUTION INTRAMUSCULAR; INTRAVENOUS at 03:56

## 2024-09-01 RX ADMIN — PIPERACILLIN SODIUM AND TAZOBACTAM SODIUM 3.38 G: 3; .375 INJECTION, POWDER, LYOPHILIZED, FOR SOLUTION INTRAVENOUS at 03:56

## 2024-09-01 RX ADMIN — PIPERACILLIN SODIUM AND TAZOBACTAM SODIUM 3.38 G: 3; .375 INJECTION, POWDER, LYOPHILIZED, FOR SOLUTION INTRAVENOUS at 09:51

## 2024-09-01 RX ADMIN — MORPHINE SULFATE 4 MG: 4 INJECTION, SOLUTION INTRAMUSCULAR; INTRAVENOUS at 16:22

## 2024-09-01 RX ADMIN — MORPHINE SULFATE 2 MG: 2 INJECTION, SOLUTION INTRAMUSCULAR; INTRAVENOUS at 01:50

## 2024-09-01 RX ADMIN — MORPHINE SULFATE 4 MG: 4 INJECTION, SOLUTION INTRAMUSCULAR; INTRAVENOUS at 18:48

## 2024-09-01 RX ADMIN — MORPHINE SULFATE 4 MG: 4 INJECTION, SOLUTION INTRAMUSCULAR; INTRAVENOUS at 20:52

## 2024-09-01 RX ADMIN — PIPERACILLIN SODIUM AND TAZOBACTAM SODIUM 3.38 G: 3; .375 INJECTION, POWDER, LYOPHILIZED, FOR SOLUTION INTRAVENOUS at 16:21

## 2024-09-01 RX ADMIN — MORPHINE SULFATE 4 MG: 4 INJECTION, SOLUTION INTRAMUSCULAR; INTRAVENOUS at 08:50

## 2024-09-01 RX ADMIN — KETOROLAC TROMETHAMINE 30 MG: 30 INJECTION, SOLUTION INTRAMUSCULAR; INTRAVENOUS at 23:12

## 2024-09-01 RX ADMIN — SODIUM CHLORIDE 125 ML/HR: 9 INJECTION, SOLUTION INTRAVENOUS at 18:48

## 2024-09-01 RX ADMIN — MORPHINE SULFATE 4 MG: 4 INJECTION, SOLUTION INTRAMUSCULAR; INTRAVENOUS at 03:38

## 2024-09-01 RX ADMIN — MORPHINE SULFATE 4 MG: 4 INJECTION, SOLUTION INTRAMUSCULAR; INTRAVENOUS at 23:42

## 2024-09-01 RX ADMIN — SODIUM CHLORIDE 125 ML/HR: 9 INJECTION, SOLUTION INTRAVENOUS at 08:50

## 2024-09-01 RX ADMIN — MORPHINE SULFATE 4 MG: 4 INJECTION, SOLUTION INTRAMUSCULAR; INTRAVENOUS at 06:40

## 2024-09-01 RX ADMIN — PIPERACILLIN SODIUM AND TAZOBACTAM SODIUM 3.38 G: 3; .375 INJECTION, POWDER, LYOPHILIZED, FOR SOLUTION INTRAVENOUS at 23:51

## 2024-09-01 NOTE — CASE MANAGEMENT/SOCIAL WORK
Discharge Planning Assessment   Fran     Patient Name: Will Gamez  MRN: [id number]  Today's Date: 9/1/2024    Admit Date: 8/31/2024    Plan: Home with family   Discharge Needs Assessment       Row Name 09/01/24 1523       Living Environment    People in Home child(cinthia), adult;child(cinthia), dependent;spouse    Current Living Arrangements home    Potentially Unsafe Housing Conditions none    In the past 12 months has the electric, gas, oil, or water company threatened to shut off services in your home? No    Primary Care Provided by self    Provides Primary Care For no one    Family Caregiver if Needed spouse    Quality of Family Relationships involved    Able to Return to Prior Arrangements yes       Resource/Environmental Concerns    Resource/Environmental Concerns none    Transportation Concerns none       Transportation Needs    In the past 12 months, has lack of transportation kept you from medical appointments or from getting medications? no    In the past 12 months, has lack of transportation kept you from meetings, work, or from getting things needed for daily living? No       Food Insecurity    Within the past 12 months, you worried that your food would run out before you got the money to buy more. Never true    Within the past 12 months, the food you bought just didn't last and you didn't have money to get more. Never true       Transition Planning    Patient/Family Anticipates Transition to home with family    Patient/Family Anticipated Services at Transition none    Transportation Anticipated car, drives self;family or friend will provide       Discharge Needs Assessment    Readmission Within the Last 30 Days no previous admission in last 30 days    Equipment Currently Used at Home none    Concerns to be Addressed no discharge needs identified    Anticipated Changes Related to Illness none    Equipment Needed After Discharge none    Provided Post Acute Provider List? N/A    Provided Post  Acute Provider Quality & Resource List? N/A                   Discharge Plan       Row Name 09/01/24 1525       Plan    Plan Home with family    Patient/Family in Agreement with Plan yes    Plan Comments Spoke to pt regarding discharge plans Confirmed  address ,Phone number and primary Care provider as being correct  on face sheet  Independent with ADLS Denies  needs at this time                  Continued Care and Services - Admitted Since 8/31/2024    No active coordination exists for this encounter.          Demographic Summary       Row Name 09/01/24 1522       General Information    Admission Type inpatient    Arrived From emergency department    Referral Source admission list    Reason for Consult discharge planning                   Functional Status       Row Name 09/01/24 1522       Functional Status    Usual Activity Tolerance good       Physical Activity    On average, how many days per week do you engage in moderate to strenuous exercise (like a brisk walk)? Pt Declined       Functional Status, IADL    Medications independent    Meal Preparation independent    Housekeeping independent    Laundry independent    Shopping independent       Mental Status    General Appearance WDL WDL                   Psychosocial    No documentation.                  Abuse/Neglect    No documentation.                  Legal    No documentation.                  Substance Abuse    No documentation.                  Patient Forms    No documentation.                     Veena Jama RN

## 2024-09-01 NOTE — CONSULTS
General Surgery Consult     Name:Will Gamez  Age: 44 y.o.  Gender: male  : 1980  MRN: [id number]  Visit Number: [id number]  Admit Date: 2024  Date of Service: 24    Patient Care Team:  Estefany Augustine APRN as PCP - General (Family Medicine)  Joshua Joel MD as Consulting Physician (Gastroenterology)    Reason for Consultation: Pelvic abscess status post colonoscopy on 2024    Chief complaint : Abdominal pain      History of Present Illness:     Will Gamez is a 44 y.o. male patient who presented to the emergency department overnight complaining of progressively worsening lower abdominal pain since undergoing a colonoscopy 2 days prior (2024) by Dr. Joel.  He reported that his pain had acutely worsened yesterday morning, and reported associated nausea, and chills.  He denied fever.  Workup in the emergency department revealed that the patient was afebrile and not tachycardic.  He was noted to be hypotensive with a blood pressure of 91/64.  There was no evidence of hypoxia.  He was noted to have tenderness on exam in the suprapubic region of the abdomen as well as the right lower quadrant. Laboratory evaluation revealed an elevated white blood cell count 12.9 with a hemoglobin of 15, hematocrit of 43, platelets of 195.  His comprehensive metabolic panel was completely normal with the exception of a glucose of 102.  A lipase level was normal.  Urinalysis was negative.  Blood cultures were obtained in the ER.  A lactate was slightly elevated at 2.1, but improved to 1.8 with hydration.  Given the patient's history, CT scan of the abdomen pelvis was performed demonstrating a 3.1 cm abscess within the pelvis contained adjacent to a pocket of gas within the mesentery, with associated severe mesenteric edema as well as edema of the adjacent sigmoid colon, and distal ileum.  There is noted to be edema of a sigmoid diverticulum adjacent to this region with several  "additional tiny pockets of mesenteric gas interposed between the abscess itself in the sigmoid colon.  It was noted that the distal appendiceal tip extended into the area of concern and was not well-visualized, but the proximal appendix was noted to be unremarkable.  Review of the patient's colonoscopy report from 8/29/2024 was undertaken, and revealed that the patient had a polypectomy of an 8 mm polyp in the ascending colon which was performed with a hot snare.  A 10 mm polyp was removed with a hot snare from the rectum.  Sigmoid diverticulosis without clear evidence of diverticulitis was documented by the endoscopist.  Additionally, note was made of patchy areas of erythematous mucosa of unclear clinical significance in the sigmoid colon thought to represent \"segmental colitis associated with diverticular disease.\"  Biopsies were obtained for histology.  Internal hemorrhoids were also seen.  I was contacted by the emergency department provider, and recommended that the patient be treated as a complicated diverticulitis, with bowel rest, hospital admission, broad-spectrum antibiotic coverage with Zosyn, and pain control.  The hospitalist graciously accepted the patient for admission and formally requested surgical consultation.    Overnight, I spoke to the hospitalist on-call at around 2 AM regarding the patient's pain control regimen.  The hospitalist was concerned that they were having difficulty controlling the patient's abdominal pain. At that time, the patient was noted to have orders for 2 mg of morphine IV every 4 hours as needed pain.  I recommended that the frequency of pain medication be increased to every 2 hours, and recommended either a higher dose of morphine, or a switch to hydromorphone.  I also recommended the use of ketorolac.  The patient had initially been placed on clear liquids per my recommendations, and I recommended that if he was having increasing pain that I would make him n.p.o. to " assist with pain control as well.    Review of his medication administration overnight demonstrates that his antibiotics were changed to ceftriaxone and Flagyl after initially receiving Zosyn x 3 doses.  It appears that he received a single dose of 30 mg of IV ketorolac as well as 7 doses of IV morphine.  2 of these were 2 mg doses, the remainder were 4 mg doses.  It appears that his last dose of pain medication was a 4 mg dose of morphine given at 8:50 AM today.     On my review of the patient's electronic medical record, it appears that he has had at least 2 prior CT scans this year demonstrating uncomplicated sigmoid diverticulitis.  The first of these was performed on 4/29/2024, and the second was performed on 7/23/2024.  It appears that he was treated on an outpatient basis with Levaquin and Flagyl for 7 days following the first episode, and was treated with Augmentin post metronidazole for 10 days after the second episode.    Patient Active Problem List   Diagnosis    LAW (obstructive sleep apnea)    Sigmoid diverticulitis    Abnormal finding on GI tract imaging    Diverticulosis of colon    Colon cancer screening    Abscess of sigmoid colon due to diverticulitis         History reviewed. No pertinent past medical history.    Past Surgical History:   Procedure Laterality Date    COLONOSCOPY N/A 8/29/2024    Procedure: COLONOSCOPY with biosy and polypectomy;  Surgeon: Joshua Joel MD;  Location: [facility] ENDOSCOPY;  Service: Gastroenterology;  Laterality: N/A;       Family History   Problem Relation Age of Onset    Hypertension Mother     Hypertension Brother        Social History     Socioeconomic History    Marital status:    Tobacco Use    Smoking status: Never    Smokeless tobacco: Former     Types: Chew   Vaping Use    Vaping status: Never Used   Substance and Sexual Activity    Alcohol use: Yes     Alcohol/week: 2.0 - 3.0 standard drinks of alcohol     Types: 2 - 3 Cans of beer per week      Comment: every other week    Drug use: No    Sexual activity: Defer         Current Facility-Administered Medications:     sennosides-docusate (PERICOLACE) 8.6-50 MG per tablet 2 tablet, 2 tablet, Oral, BID PRN **AND** polyethylene glycol (MIRALAX) packet 17 g, 17 g, Oral, Daily PRN **AND** bisacodyl (DULCOLAX) EC tablet 5 mg, 5 mg, Oral, Daily PRN **AND** bisacodyl (DULCOLAX) suppository 10 mg, 10 mg, Rectal, Daily PRN, Boris, Juan Issac, DO    morphine injection 4 mg, 4 mg, Intravenous, Q2H PRN, Boris, Juan Issac, DO, 4 mg at 09/01/24 0850    Morphine sulfate (PF) injection 2 mg, 2 mg, Intravenous, Q4H PRN, 2 mg at 09/01/24 0150 **AND** naloxone (NARCAN) injection 0.4 mg, 0.4 mg, Intravenous, Q5 Min PRN, Boris, Juan Issac, DO    ondansetron (ZOFRAN) injection 4 mg, 4 mg, Intravenous, Q6H PRN, Boris, Juan Issac, DO    piperacillin-tazobactam (ZOSYN) IVPB 3.375 g IVPB in 100 mL NS (VTB), 3.375 g, Intravenous, Q6H, Boris, Juan Issac, DO, 3.375 g at 09/01/24 0951    sodium chloride 0.9 % flush 10 mL, 10 mL, Intravenous, PRN, Gil Lanza MD    sodium chloride 0.9 % flush 10 mL, 10 mL, Intravenous, Q12H, Boris, Juan Issac, DO, 10 mL at 08/31/24 2119    sodium chloride 0.9 % flush 10 mL, 10 mL, Intravenous, PRN, Boris, Juan Issac, DO    sodium chloride 0.9 % infusion 40 mL, 40 mL, Intravenous, PRN, Boris, Juan Issac, DO    sodium chloride 0.9 % infusion, 125 mL/hr, Intravenous, Continuous, Boris, Juan Issac, DO, Last Rate: 125 mL/hr at 09/01/24 0850, 125 mL/hr at 09/01/24 0850    Medications Prior to Admission   Medication Sig Dispense Refill Last Dose    cyclobenzaprine (FLEXERIL) 5 MG tablet Take 1 tablet by mouth At Night As Needed for Muscle Spasms. 20 tablet 1     nystatin (MYCOSTATIN) 100,000 unit/mL suspension Swish and swallow 5 mL 4 (Four) Times a Day. (Patient not taking: Reported on 8/28/2024) 60 mL 0 8/20/2024       No Known Allergies    Review of Systems   Constitutional:  Negative for chills,  fever and unexpected weight change.   HENT:  Negative for trouble swallowing and voice change.    Eyes:  Negative for visual disturbance.   Respiratory:  Negative for apnea, cough, chest tightness, shortness of breath and wheezing.    Cardiovascular:  Negative for chest pain, palpitations and leg swelling.   Gastrointestinal:  Positive for abdominal pain and nausea. Negative for abdominal distention, anal bleeding, blood in stool, constipation, diarrhea, rectal pain and vomiting.   Endocrine: Negative for cold intolerance and heat intolerance.   Genitourinary:  Negative for difficulty urinating, dysuria, flank pain, scrotal swelling and testicular pain.   Musculoskeletal:  Negative for back pain, gait problem and joint swelling.   Skin:  Negative for color change, rash and wound.   Neurological:  Negative for dizziness, syncope, speech difficulty, weakness, numbness and headaches.   Hematological:  Negative for adenopathy. Does not bruise/bleed easily.   Psychiatric/Behavioral:  Negative for confusion. The patient is not nervous/anxious.        OBJECTIVE:     Vital Signs  Temp:  [97.8 °F (36.6 °C)-99.9 °F (37.7 °C)] 98.6 °F (37 °C)  Heart Rate:  [60-95] 70  Resp:  [18-24] 18  BP: ()/(59-90) 100/67    No intake/output data recorded.  I/O last 3 completed shifts:  In: 100 [IV Piggyback:100]  Out: 300 [Urine:300]      Physical Exam:      General Appearance:    Alert, cooperative, in no acute distress   Head:    Normocephalic, without obvious abnormality, atraumatic   Eyes:            Lids and lashes normal, conjunctivae and sclerae normal, no icterus   Ears:    Ears appear intact with no abnormalities noted   Lungs:     Respirations regular, even and unlabored    Heart:    Regular rhythm and normal rate   Abdomen:     Soft, exquisitely ttp in the suprapubic region and right lower quadrant consistent with focal peritonitis.  No evidence of diffuse peritonitis.   Extremities:   Moves all extremities well, no edema,  no cyanosis, no  redness   Skin:   No bleeding, bruising or rash   Neurologic:   AAOx3, no gross deficits         Results Review:  I have reviewed the entirety of the patient's clinical lab results.  I have also personally reviewed the patient's imaging    Narrative & Impression   FINAL REPORT     TECHNIQUE:  Routine axial images through the abdomen and pelvis were  obtained following IV contrast administration.     CLINICAL HISTORY:  Flank pain, kidney stone suspected llq pain x 1 week hx of  diverticulosis     FINDINGS:  Abdomen: The gallbladder is normal.  The solid abdominal organs  and ureters are unremarkable.  There is sigmoid diverticulosis  with significant inflammation adjacent to the proximal sigmoid  colon consistent with acute uncomplicated sigmoid  diverticulitis.  The remainder of the GI tract is unremarkable,  including the appendix.  Pelvis: The urinary bladder is  unremarkable.   There is no pelvic or abdominal ascites,  adenopathy or acute osseous abnormality.     IMPRESSION:  Acute uncomplicated sigmoid diverticulitis.     Authenticated and Electronically Signed by Corky Eldridge M.D. on  04/29/2024 07:21:25 PM       Narrative & Impression   CT ABDOMEN PELVIS W CONTRAST     Date of Exam: 7/23/2024 12:44 PM EDT     Indication: hx diverticulitis, with worsening abdominal pain..     Comparison: 4/29/2024     Technique: Axial CT images were obtained of the abdomen and pelvis following the uneventful intravenous administration of 98 cc Isovue-300. Reconstructed coronal and sagittal images were also obtained. Automated exposure control and iterative   construction methods were used.        Findings:  LUNG BASES:  Unremarkable without mass or infiltrate.     LIVER:  Unremarkable parenchyma without focal lesion.  BILIARY/GALLBLADDER:  Unremarkable  SPLEEN:  Unremarkable  PANCREAS:  Unremarkable  ADRENAL:  Unremarkable  KIDNEYS:  Unremarkable parenchyma with no solid mass identified. No obstruction.  No  calculus identified.  GASTROINTESTINAL/MESENTERY: There are inflammatory changes involving the mid sigmoid colon centered on a diverticulum, consistent with acute diverticulitis. There are surrounding inflammatory changes. No perforation nor abscess formation noted. Findings   are slightly more distal within the sigmoid colon compared to most recent exam. No evidence of obstruction nor additional site of inflammation. The appendix is normal.  MESENTERIC VESSELS:  Patent.  AORTA/IVC:  Normal caliber.     RETROPERITONEUM/LYMPH NODES:  Unremarkable     REPRODUCTIVE:  Unremarkable  BLADDER:  Unremarkable     OSSEUS STRUCTURES:  Typical for age with no acute process identified.        IMPRESSION:  Impression:  1.Acute mid sigmoid uncomplicated diverticulitis.           Electronically Signed: Pipo Cole MD    7/23/2024 1:21 PM EDT    Workstation ID: [id number]         Narrative & Impression   FINAL REPORT     TECHNIQUE:  null     CLINICAL HISTORY:  Lower abd, RLQ abd pain, eval diverticulitis, colitis, kidney  stone, appendici     COMPARISON:  null     FINDINGS:  CT abdomen and pelvis with contrast     Comparison: None     Findings:     Mild dependent changes at the lung bases.     Unremarkable gallbladder and solid organs. No urolithiasis.     3.1 x 1.8 x 1.2 cm abscess containing fluid and a pocket of gas within the mesentery of the right hemipelvis. There is severe edema of the adjacent mesentery. There is edema of adjacent portions of the sigmoid colon and distal ileum. There is edema of a   sigmoidal diverticulum and several additional tiny pockets of mesenteric gas are present interposed between the abscess and the sigmoid colon. The proximal appendix is unremarkable, but the distal appendix extends into the area of edema and does not well   visualized.     There is an umbilical hernia containing fat.     Unremarkable prostate gland and urinary bladder. Trace pelvic free fluid.     No acute fracture.      IMPRESSION:  IMPRESSION:     3.1 cm abscess and several tiny pockets of mesenteric gas within the mesentery of the right hemipelvis. This is most likely on the basis of perforated diverticulitis of the sigmoid colon, but appendix tip perforation is not completely excluded. There is   secondary edema of an adjacent portion of the distal ileum.     Authenticated and Electronically Signed by Laura Gilliland MD on  08/31/2024 07:10:08 PM           Lab Results (last 72 hours)       Procedure Component Value Units Date/Time    Comprehensive Metabolic Panel [[id number]]  (Abnormal) Collected: 09/01/24 0447    Specimen: Blood Updated: 09/01/24 0543     Glucose 104 mg/dL      BUN 11 mg/dL      Creatinine 1.27 mg/dL      Sodium 137 mmol/L      Potassium 3.9 mmol/L      Chloride 104 mmol/L      CO2 21.7 mmol/L      Calcium 9.2 mg/dL      Total Protein 6.6 g/dL      Albumin 3.7 g/dL      ALT (SGPT) 26 U/L      AST (SGOT) 20 U/L      Alkaline Phosphatase 64 U/L      Total Bilirubin 1.4 mg/dL      Globulin 2.9 gm/dL      A/G Ratio 1.3 g/dL      BUN/Creatinine Ratio 8.7     Anion Gap 11.3 mmol/L      eGFR 70.6 mL/min/1.73     Narrative:      GFR Normal >60  Chronic Kidney Disease <60  Kidney Failure <15      Lactate Dehydrogenase [[id number]]  (Abnormal) Collected: 09/01/24 0447    Specimen: Blood Updated: 09/01/24 0543      U/L     Lactic Acid, Plasma [[id number]]  (Normal) Collected: 09/01/24 0447    Specimen: Blood Updated: 09/01/24 0535     Lactate 0.8 mmol/L     CBC (No Diff) [[id number]]  (Abnormal) Collected: 09/01/24 0447    Specimen: Blood Updated: 09/01/24 0457     WBC 13.40 10*3/mm3      RBC 4.33 10*6/mm3      Hemoglobin 13.8 g/dL      Hematocrit 39.9 %      MCV 92.1 fL      MCH 31.9 pg      MCHC 34.6 g/dL      RDW 12.6 %      RDW-SD 42.8 fl      MPV 9.2 fL      Platelets 169 10*3/mm3     STAT Lactic Acid, Reflex [[id number]]  (Normal) Collected: 08/31/24 2217    Specimen: Blood Updated: 08/31/24 2234     Lactate  1.8 mmol/L     Lactic Acid, Plasma [[id number]]  (Abnormal) Collected: 08/31/24 1929    Specimen: Blood Updated: 08/31/24 2009     Lactate 2.1 mmol/L     Blood Culture - Blood, Arm, Left [[id number]] Collected: 08/31/24 1929    Specimen: Blood from Arm, Left Updated: 08/31/24 1933    Blood Culture - Blood, Hand, Left [[id number]] Collected: 08/31/24 1920    Specimen: Blood from Hand, Left Updated: 08/31/24 1933    Urinalysis With Microscopic If Indicated (No Culture) - Urine, Clean Catch [[id number]]  (Abnormal) Collected: 08/31/24 1803    Specimen: Urine, Clean Catch Updated: 08/31/24 1808     Color, UA Yellow     Appearance, UA Clear     pH, UA >=9.0     Specific Gravity, UA 1.014     Glucose, UA Negative     Ketones, UA Negative     Bilirubin, UA Negative     Blood, UA Negative     Protein, UA Negative     Leuk Esterase, UA Negative     Nitrite, UA Negative     Urobilinogen, UA 0.2 E.U./dL    Narrative:      Urine microscopic not indicated.    Lipase [[id number]]  (Normal) Collected: 08/31/24 1700    Specimen: Blood Updated: 08/31/24 1732     Lipase 19 U/L     Comprehensive Metabolic Panel [[id number]]  (Abnormal) Collected: 08/31/24 1700    Specimen: Blood Updated: 08/31/24 1732     Glucose 102 mg/dL      BUN 10 mg/dL      Creatinine 1.13 mg/dL      Sodium 138 mmol/L      Potassium 3.9 mmol/L      Chloride 101 mmol/L      CO2 22.9 mmol/L      Calcium 9.9 mg/dL      Total Protein 7.5 g/dL      Albumin 4.3 g/dL      ALT (SGPT) 34 U/L      AST (SGOT) 27 U/L      Alkaline Phosphatase 79 U/L      Total Bilirubin 0.6 mg/dL      Globulin 3.2 gm/dL      A/G Ratio 1.3 g/dL      BUN/Creatinine Ratio 8.8     Anion Gap 14.1 mmol/L      eGFR 81.2 mL/min/1.73     Narrative:      GFR Normal >60  Chronic Kidney Disease <60  Kidney Failure <15      [place] Draw [[id number]] Collected: 08/31/24 1700    Specimen: Blood Updated: 08/31/24 1716    Narrative:      The following orders were created for panel order [place] Draw.  Procedure                                Abnormality         Status                     ---------                               -----------         ------                     Green Top (Gel)[[id number]]                                  Final result               Lavender Top[[id number]]                                     Final result               Gold Top - SST[[id number]]                                   Final result               Light Blue Top[[id number]]                                   Final result                 Please view results for these tests on the individual orders.    Green Top (Gel) [[id number]] Collected: 08/31/24 1700    Specimen: Blood Updated: 08/31/24 1716     Extra Tube Hold for add-ons.     Comment: Auto resulted.       Lavender Top [[id number]] Collected: 08/31/24 1700    Specimen: Blood Updated: 08/31/24 1716     Extra Tube hold for add-on     Comment: Auto resulted       Gold Top - SST [[id number]] Collected: 08/31/24 1700    Specimen: Blood Updated: 08/31/24 1716     Extra Tube Hold for add-ons.     Comment: Auto resulted.       Light Blue Top [[id number]] Collected: 08/31/24 1700    Specimen: Blood Updated: 08/31/24 1716     Extra Tube Hold for add-ons.     Comment: Auto resulted       CBC & Differential [[id number]]  (Abnormal) Collected: 08/31/24 1700    Specimen: Blood Updated: 08/31/24 1710    Narrative:      The following orders were created for panel order CBC & Differential.  Procedure                               Abnormality         Status                     ---------                               -----------         ------                     CBC Auto Differential[[id number]]        Abnormal            Final result                 Please view results for these tests on the individual orders.    CBC Auto Differential [[id number]]  (Abnormal) Collected: 08/31/24 1700    Specimen: Blood Updated: 08/31/24 1710     WBC 12.89 10*3/mm3      RBC 4.74 10*6/mm3      Hemoglobin 15.0 g/dL      Hematocrit 43.0 %       MCV 90.7 fL      MCH 31.6 pg      MCHC 34.9 g/dL      RDW 12.4 %      RDW-SD 40.7 fl      MPV 9.4 fL      Platelets 195 10*3/mm3      Neutrophil % 74.8 %      Lymphocyte % 16.2 %      Monocyte % 6.4 %      Eosinophil % 1.8 %      Basophil % 0.4 %      Immature Grans % 0.4 %      Neutrophils, Absolute 9.65 10*3/mm3      Lymphocytes, Absolute 2.09 10*3/mm3      Monocytes, Absolute 0.82 10*3/mm3      Eosinophils, Absolute 0.23 10*3/mm3      Basophils, Absolute 0.05 10*3/mm3      Immature Grans, Absolute 0.05 10*3/mm3      nRBC 0.0 /100 WBC                             ASSESSMENT/PLAN:      Abscess of sigmoid colon due to diverticulitis    Mr. Gamez is a 44-year-old gentleman admitted overnight with evidence of what appears to be complicated diverticulitis with development of a small pelvic abscess following colonoscopy at this institution on 8/29/2024.  Based on my personal review of the patient's available records, including his emergency department records, recent colonoscopy report, and CT imaging, I suspect that he likely had a smoldering diverticulitis at the time of colonoscopy.  This would certainly explain his lower abdominal pain which preceded the colonoscopy evaluation, as well as the described areas of patchy, erythematous mucosa in the sigmoid colon which were felt to be of uncertain etiology, and biopsied.  I suspect he developed either a colonic microperforation, or perforation of the diverticulum during the colonoscopy leading to an acute exacerbation of diverticulitis, which is complicated based on the presence of a small pelvic abscess.  Fortunately, this should improve with standard therapy for complicated diverticulitis, which is nonoperative in nature.  Specifically, the patient should be continued on bowel rest.  He may have sips water and ice chips as tolerated, but I would not further advance his diet until his pain is improved.  Additionally, I would recommend continued broad-spectrum  antibiotic coverage, as well as pain control, and antiemetics as needed.      Although I generally prefer single agent therapy with piperacillin/tazobactam for complicated diverticulitis, a cephalosporin plus Flagyl regimen is an acceptable alternative. If a cephalosporin is preferred for therapy, I would recommend changing to cefepime based on our local susceptibilities, which are between 91 and 100% for Enterobacter (97), E. Coli (97), Klebsiella (98), Proteus (100), and Pseudomonas (91).  The numbers are similar for piperacillin/tazobactam with the exception of Enterobacter for which only 73% of isolates demonstrated sensitivity to Zosyn.  The patient's current regimen which includes ceftriaxone, provides no coverage for Pseudomonas, and only 83% of E. coli isolates were susceptible, and only 70% of Enterobacter isolates were susceptible to ceftriaxone.  It is worth noting for antibiotic selection purposes but the patient was treated with Levaquin/Flagyl as well as Augmentin/Flagyl over the last 5 months for 2 prior episodes of diverticulitis.      I had a long discussion with the patient and his wife regarding therapy for diverticulitis.  They had a number of questions which were answered to their satisfaction.  At this time, I do not recommend, or anticipate any acute surgical intervention for Mr. Gamez for his diverticulitis.  I expect that he will be hospitalized for 2 to 3 days before being appropriate to transition to oral antibiotics for discharge home.      Sandy Moran MD  09/01/24  11:48 EDT

## 2024-09-01 NOTE — H&P
[facility]   HISTORY AND PHYSICAL      Name:  Will Gamez   Age:  44 y.o.  Sex:  male  :  1980  MRN:  [id number]   Visit Number:  [id number]  Admission Date:  2024  Date Of Service:  24  Primary Care Physician:  Estefany Augustine APRN    Chief Complaint:   Abdominal pain      History Of Presenting Illness:    Patient is a 44-year-old male with no significant past medical history who presented today for worsening abdominal pain.  Patient shared that over the last few months he has been suffering with mild intermittent right lower quadrant and suprapubic abdominal pain.  To evaluate his chronic pain, Dr. Joel performed colonoscopy on 2024 where diverticulosis was noted and 2 polyps were removed.  Patient was doing well until this morning where he felt progressive abdominal pain and lack of appetite.  He had a normal bowel movement in the morning.  He had no appetite through the day and stated he only had 4 bites of chicken.     Upon evaluation in the emergency room, patient had mild elevation in WBC and CT abdomen presented 3 cm abdominal abscess with areas of mesenteric gas manage right hemipelvis.  Dr. Moran was consulted and advised on admission and n.p.o. after midnight for possible procedure in AM.     Review Of Systems:  Review of Systems   Constitutional:  Negative for chills, fatigue and fever.   HENT:  Negative for congestion, ear pain, rhinorrhea, sinus pressure and sore throat.    Eyes:  Negative for visual disturbance.   Respiratory:  Negative for cough, chest tightness, shortness of breath and wheezing.    Cardiovascular:  Negative for chest pain, palpitations and leg swelling.   Gastrointestinal:  Positive for abdominal pain. Negative for blood in stool, constipation, diarrhea, nausea and vomiting.   Endocrine: Negative for polydipsia and polyuria.   Genitourinary:  Negative for dysuria and hematuria.   Musculoskeletal:  Negative for  arthralgias and back pain.   Skin:  Negative for rash.   Neurological:  Negative for dizziness, light-headedness, numbness and headaches.   Psychiatric/Behavioral:  Negative for dysphoric mood and sleep disturbance. The patient is not nervous/anxious.         Past Medical History:    History reviewed. No pertinent past medical history.    Past Surgical history:    Past Surgical History:   Procedure Laterality Date    COLONOSCOPY N/A 8/29/2024    Procedure: COLONOSCOPY with biosy and polypectomy;  Surgeon: Joshua Joel MD;  Location: [facility] ENDOSCOPY;  Service: Gastroenterology;  Laterality: N/A;       Social History:    Social History     Socioeconomic History    Marital status:    Tobacco Use    Smoking status: Never    Smokeless tobacco: Former     Types: Chew   Vaping Use    Vaping status: Never Used   Substance and Sexual Activity    Alcohol use: Yes     Alcohol/week: 2.0 - 3.0 standard drinks of alcohol     Types: 2 - 3 Cans of beer per week     Comment: every other week    Drug use: No    Sexual activity: Defer         Family History:    Family History   Problem Relation Age of Onset    Hypertension Mother     Hypertension Brother        Allergies:      Patient has no known allergies.    Home Medications:    Prior to Admission Medications       Prescriptions Last Dose Informant Patient Reported? Taking?    cyclobenzaprine (FLEXERIL) 5 MG tablet  Self No No    Take 1 tablet by mouth At Night As Needed for Muscle Spasms.    nystatin (MYCOSTATIN) 100,000 unit/mL suspension   No No    Swish and swallow 5 mL 4 (Four) Times a Day.    Patient not taking:  Reported on 8/28/2024               ED Medications:    Medications   sodium chloride 0.9 % flush 10 mL (has no administration in time range)   morphine injection 4 mg (4 mg Intravenous Given 8/31/24 1809)   ondansetron (ZOFRAN) injection 4 mg (4 mg Intravenous Given 8/31/24 1809)   lactated ringers bolus 1,000 mL (1,000 mL Intravenous New Bag 8/31/24 1809)    iopamidol (ISOVUE-300) 61 % injection 100 mL (100 mL Intravenous Given 8/31/24 1831)   piperacillin-tazobactam (ZOSYN) IVPB 4.5 g IVPB in 100 mL NS (VTB) (4.5 g Intravenous New Bag 8/31/24 2007)       Vital Signs:    Temp:  [97.8 °F (36.6 °C)] 97.8 °F (36.6 °C)  Heart Rate:  [75-95] 85  Resp:  [18] 18  BP: ()/(64-90) 110/71        08/31/24  1617   Weight: 93 kg (205 lb)       Body mass index is 31.17 kg/m².    Physical Exam:  Physical Exam  Vitals and nursing note reviewed.   Constitutional:       Appearance: Normal appearance. He is well-developed. He is obese.   HENT:      Head: Normocephalic and atraumatic.      Nose: Nose normal.      Mouth/Throat:      Mouth: Mucous membranes are moist.      Pharynx: No oropharyngeal exudate.   Eyes:      General: No scleral icterus.     Conjunctiva/sclera: Conjunctivae normal.      Pupils: Pupils are equal, round, and reactive to light.   Neck:      Thyroid: No thyromegaly.   Cardiovascular:      Rate and Rhythm: Normal rate and regular rhythm.      Heart sounds: Normal heart sounds. No murmur heard.     No friction rub. No gallop.   Pulmonary:      Effort: Pulmonary effort is normal. No respiratory distress.      Breath sounds: Normal breath sounds. No wheezing.   Abdominal:      General: Bowel sounds are normal. There is no distension.      Palpations: Abdomen is soft.      Tenderness: There is abdominal tenderness.   Musculoskeletal:         General: No deformity or signs of injury.      Cervical back: Normal range of motion and neck supple.   Lymphadenopathy:      Cervical: No cervical adenopathy.   Skin:     General: Skin is warm and dry.      Findings: No rash.   Neurological:      Mental Status: He is alert and oriented to person, place, and time.   Psychiatric:         Mood and Affect: Mood normal.         Behavior: Behavior normal.         EKG:    Sinus rhythm with incomplete right bundle darrel block, ventricular rate 70    Labs:    Lab Results (last 24  hours)       Procedure Component Value Units Date/Time    Lactic Acid, Plasma [[id number]]  (Abnormal) Collected: 08/31/24 1929    Specimen: Blood Updated: 08/31/24 2009     Lactate 2.1 mmol/L     Blood Culture - Blood, Arm, Left [[id number]] Collected: 08/31/24 1929    Specimen: Blood from Arm, Left Updated: 08/31/24 1933    Blood Culture - Blood, Hand, Left [[id number]] Collected: 08/31/24 1920    Specimen: Blood from Hand, Left Updated: 08/31/24 1933    Urinalysis With Microscopic If Indicated (No Culture) - Urine, Clean Catch [[id number]]  (Abnormal) Collected: 08/31/24 1803    Specimen: Urine, Clean Catch Updated: 08/31/24 1808     Color, UA Yellow     Appearance, UA Clear     pH, UA >=9.0     Specific Gravity, UA 1.014     Glucose, UA Negative     Ketones, UA Negative     Bilirubin, UA Negative     Blood, UA Negative     Protein, UA Negative     Leuk Esterase, UA Negative     Nitrite, UA Negative     Urobilinogen, UA 0.2 E.U./dL    Narrative:      Urine microscopic not indicated.    Lipase [[id number]]  (Normal) Collected: 08/31/24 1700    Specimen: Blood Updated: 08/31/24 1732     Lipase 19 U/L     Comprehensive Metabolic Panel [[id number]]  (Abnormal) Collected: 08/31/24 1700    Specimen: Blood Updated: 08/31/24 1732     Glucose 102 mg/dL      BUN 10 mg/dL      Creatinine 1.13 mg/dL      Sodium 138 mmol/L      Potassium 3.9 mmol/L      Chloride 101 mmol/L      CO2 22.9 mmol/L      Calcium 9.9 mg/dL      Total Protein 7.5 g/dL      Albumin 4.3 g/dL      ALT (SGPT) 34 U/L      AST (SGOT) 27 U/L      Alkaline Phosphatase 79 U/L      Total Bilirubin 0.6 mg/dL      Globulin 3.2 gm/dL      A/G Ratio 1.3 g/dL      BUN/Creatinine Ratio 8.8     Anion Gap 14.1 mmol/L      eGFR 81.2 mL/min/1.73     Narrative:      GFR Normal >60  Chronic Kidney Disease <60  Kidney Failure <15      [place] Draw [[id number]] Collected: 08/31/24 1700    Specimen: Blood Updated: 08/31/24 1716    Narrative:      The following orders were created  for panel order [place] Draw.  Procedure                               Abnormality         Status                     ---------                               -----------         ------                     Green Top (Gel)[[id number]]                                  Final result               Lavender Top[[id number]]                                     Final result               Gold Top - SST[[id number]]                                   Final result               Light Blue Top[[id number]]                                   Final result                 Please view results for these tests on the individual orders.    Green Top (Gel) [[id number]] Collected: 08/31/24 1700    Specimen: Blood Updated: 08/31/24 1716     Extra Tube Hold for add-ons.     Comment: Auto resulted.       Lavender Top [[id number]] Collected: 08/31/24 1700    Specimen: Blood Updated: 08/31/24 1716     Extra Tube hold for add-on     Comment: Auto resulted       Gold Top - SST [[id number]] Collected: 08/31/24 1700    Specimen: Blood Updated: 08/31/24 1716     Extra Tube Hold for add-ons.     Comment: Auto resulted.       Light Blue Top [[id number]] Collected: 08/31/24 1700    Specimen: Blood Updated: 08/31/24 1716     Extra Tube Hold for add-ons.     Comment: Auto resulted       CBC & Differential [[id number]]  (Abnormal) Collected: 08/31/24 1700    Specimen: Blood Updated: 08/31/24 1710    Narrative:      The following orders were created for panel order CBC & Differential.  Procedure                               Abnormality         Status                     ---------                               -----------         ------                     CBC Auto Differential[[id number]]        Abnormal            Final result                 Please view results for these tests on the individual orders.    CBC Auto Differential [[id number]]  (Abnormal) Collected: 08/31/24 1700    Specimen: Blood Updated: 08/31/24 1710     WBC 12.89 10*3/mm3      RBC 4.74 10*6/mm3       Hemoglobin 15.0 g/dL      Hematocrit 43.0 %      MCV 90.7 fL      MCH 31.6 pg      MCHC 34.9 g/dL      RDW 12.4 %      RDW-SD 40.7 fl      MPV 9.4 fL      Platelets 195 10*3/mm3      Neutrophil % 74.8 %      Lymphocyte % 16.2 %      Monocyte % 6.4 %      Eosinophil % 1.8 %      Basophil % 0.4 %      Immature Grans % 0.4 %      Neutrophils, Absolute 9.65 10*3/mm3      Lymphocytes, Absolute 2.09 10*3/mm3      Monocytes, Absolute 0.82 10*3/mm3      Eosinophils, Absolute 0.23 10*3/mm3      Basophils, Absolute 0.05 10*3/mm3      Immature Grans, Absolute 0.05 10*3/mm3      nRBC 0.0 /100 WBC             Radiology:    Imaging Results (Last 72 Hours)       Procedure Component Value Units Date/Time    CT Abdomen Pelvis With Contrast [[id number]] Collected: 08/31/24 1910     Updated: 08/31/24 1916    Addenda:        ADDENDUM REPORT    ADDENDUM:  This report was discussed with Cait Winn RN on Aug 31, 2024   19:15:00 EDT.    Authenticated and Electronically Signed by Laura Gilliland MD on  08/31/2024 07:15:32 PM  Signed: 08/31/24 1915 by Laura Gilliland MD    Narrative:      FINAL REPORT    TECHNIQUE:  null    CLINICAL HISTORY:  Lower abd, RLQ abd pain, eval diverticulitis, colitis, kidney  stone, appendici    COMPARISON:  null    FINDINGS:  CT abdomen and pelvis with contrast    Comparison: None    Findings:    Mild dependent changes at the lung bases.    Unremarkable gallbladder and solid organs. No urolithiasis.    3.1 x 1.8 x 1.2 cm abscess containing fluid and a pocket of gas within the mesentery of the right hemipelvis. There is severe edema of the adjacent mesentery. There is edema of adjacent portions of the sigmoid colon and distal ileum. There is edema of a   sigmoidal diverticulum and several additional tiny pockets of mesenteric gas are present interposed between the abscess and the sigmoid colon. The proximal appendix is unremarkable, but the distal appendix extends into the area of edema and does not  well   visualized.    There is an umbilical hernia containing fat.    Unremarkable prostate gland and urinary bladder. Trace pelvic free fluid.    No acute fracture.      Impression:      IMPRESSION:    3.1 cm abscess and several tiny pockets of mesenteric gas within the mesentery of the right hemipelvis. This is most likely on the basis of perforated diverticulitis of the sigmoid colon, but appendix tip perforation is not completely excluded. There is   secondary edema of an adjacent portion of the distal ileum.    Authenticated and Electronically Signed by Laura Gilliland MD on  08/31/2024 07:10:08 PM            Assessment:    Abscess of sigmoid colon due to diverticulitis      Plan:   Sigmoid colon diverticulitis and abscess  - 3.1 x 1.8 x 1.2 cm abscess as well as right hemipelvic mesenteric gas on CT abdomen pelvis  - Patient had recent colonoscopy on 8/29/2024  - Dr. Moran has been consulted from the emergency room.  Plan for possible surgical intervention in the morning.  Patient to be on clear liquids today.  N.p.o. after midnight.  -Start normal saline 125 mL/h for hydration  -IV morphine for pain control  -IV Zofran for nausea    Juan Escalante DO  08/31/24  20:32 EDT

## 2024-09-01 NOTE — PLAN OF CARE
Goal Outcome Evaluation:  Plan of Care Reviewed With: patient, spouse   Continues to have consistent pain with partial relief from PRN meds.  Ambulation to bathroom with no obvious difficulty late this afternoon  No SOA  Maintaining SAT on room air.  Instruction provided on use of spirometer.

## 2024-09-01 NOTE — PROGRESS NOTES
[facility]    Name:  Will Gamez   Age:  44 y.o.  Sex:  male  :  1980  MRN:  [id number]   Visit Number:  [id number]  Admission Date:  2024  Date Of Service:  24  Primary Care Physician:  Estefany Augustine APRN     LOS: 1 day :    Chief Complaint:      Abdominal pain    Subjective:    24: complaining of significant right lower abdominal pain. D/w Dr. Moran.    History of presenting illness:      Patient is a 44-year-old male with no significant past medical history who presented today for worsening abdominal pain.  Patient shared that over the last few months he has been suffering with mild intermittent right lower quadrant and suprapubic abdominal pain.  To evaluate his chronic pain, Dr. Joel performed colonoscopy on 2024 where diverticulosis was noted and 2 polyps were removed.  Patient was doing well until this morning where he felt progressive abdominal pain and lack of appetite.  He had a normal bowel movement in the morning.  He had no appetite through the day and stated he only had 4 bites of chicken.      Upon evaluation in the emergency room, patient had mild elevation in WBC and CT abdomen presented 3 cm abdominal abscess with areas of mesenteric gas manage right hemipelvis.  Dr. Moran was consulted and advised on admission and n.p.o. after midnight for possible procedure in AM.   Edited by: Kai Pérez DO at 2024 1206     Review of Systems:     All systems were reviewed and negative except as mentioned in subjective, assessment and plan.    Vital Signs:    Temp:  [97.8 °F (36.6 °C)-99.9 °F (37.7 °C)] 98.6 °F (37 °C)  Heart Rate:  [60-95] 70  Resp:  [18-24] 18  BP: ()/(59-90) 100/67    Intake and output:    I/O last 3 completed shifts:  In: 100 [IV Piggyback:100]  Out: 300 [Urine:300]  No intake/output data recorded.    Physical Examination:    Constitutional: No acute distress, awake, alert  HENT:  "NCAT, mucous membranes moist  Respiratory: Clear to auscultation bilaterally, respiratory effort normal   Cardiovascular: RRR, no murmurs, rubs, or gallops  Gastrointestinal: Positive bowel sounds, soft, severe lower abdominal and RLQ tenderness. Nondistended.  Musculoskeletal: No bilateral ankle edema  Psychiatric: Appropriate affect, cooperative  Neurologic: Oriented x 3, speech clear  Skin: No rashes  Edited by: Kai Pérez DO at 9/1/2024 1203     Laboratory results:    Results from last 7 days   Lab Units 09/01/24  0447 08/31/24  1700   SODIUM mmol/L 137 138   POTASSIUM mmol/L 3.9 3.9   CHLORIDE mmol/L 104 101   CO2 mmol/L 21.7* 22.9   BUN mg/dL 11 10   CREATININE mg/dL 1.27 1.13   CALCIUM mg/dL 9.2 9.9   BILIRUBIN mg/dL 1.4* 0.6   ALK PHOS U/L 64 79   ALT (SGPT) U/L 26 34   AST (SGOT) U/L 20 27   GLUCOSE mg/dL 104* 102*     Results from last 7 days   Lab Units 09/01/24  0447 08/31/24  1700   WBC 10*3/mm3 13.40* 12.89*   HEMOGLOBIN g/dL 13.8 15.0   HEMATOCRIT % 39.9 43.0   PLATELETS 10*3/mm3 169 195                 No results for input(s): \"PHART\", \"DMZ0UMF\", \"PO2ART\", \"JST6ALH\", \"BASEEXCESS\" in the last 8760 hours.   I have reviewed the patient's laboratory results.    Radiology results:    CT Abdomen Pelvis With Contrast    Addendum Date: 8/31/2024    ADDENDUM REPORT ADDENDUM: This report was discussed with Cait Winn RN on Aug 31, 2024 19:15:00 EDT. Authenticated and Electronically Signed by Laura Gilliland MD on 08/31/2024 07:15:32 PM    Result Date: 8/31/2024  FINAL REPORT TECHNIQUE: null CLINICAL HISTORY: Lower abd, RLQ abd pain, eval diverticulitis, colitis, kidney stone, appendici COMPARISON: null FINDINGS: CT abdomen and pelvis with contrast Comparison: None Findings: Mild dependent changes at the lung bases. Unremarkable gallbladder and solid organs. No urolithiasis. 3.1 x 1.8 x 1.2 cm abscess containing fluid and a pocket of gas within the mesentery of the right hemipelvis. There is " severe edema of the adjacent mesentery. There is edema of adjacent portions of the sigmoid colon and distal ileum. There is edema of a sigmoidal diverticulum and several additional tiny pockets of mesenteric gas are present interposed between the abscess and the sigmoid colon. The proximal appendix is unremarkable, but the distal appendix extends into the area of edema and does not well  visualized. There is an umbilical hernia containing fat. Unremarkable prostate gland and urinary bladder. Trace pelvic free fluid. No acute fracture.     Impression: IMPRESSION: 3.1 cm abscess and several tiny pockets of mesenteric gas within the mesentery of the right hemipelvis. This is most likely on the basis of perforated diverticulitis of the sigmoid colon, but appendix tip perforation is not completely excluded. There is secondary edema of an adjacent portion of the distal ileum. Authenticated and Electronically Signed by Laura Gilliland MD on 08/31/2024 07:10:08 PM   I have reviewed the patient's radiology reports.    Medication Review:     I have reviewed the patient's active and prn medications.     Problem List:      Abscess of sigmoid colon due to diverticulitis      Assessment/Plan:    Sigmoid colon diverticulitis and abscess  - 3.1 x 1.8 x 1.2 cm abscess as well as right hemipelvic mesenteric gas on CT abdomen pelvis  - Patient had recent colonoscopy on 8/29/2024  - Dr. Moran has been consulted from the emergency room.  Plan for possible surgical intervention today.    -Start normal saline 125 mL/h for hydration. Monitor bicarb.  -IV morphine/Toradol for pain control  -IV Zofran for nausea  - change zosyn to rocephin flagyl      Code status Full  Diet: NPO  Discharge Plan: under evaluation anticipate home after surgery        Edited by: Kai Pérez DO at 9/1/2024 1203           Kai Pérez DO  09/01/24  12:03 EDT    Dictated utilizing Dragon dictation.

## 2024-09-01 NOTE — PROGRESS NOTES
Pharmacokinetic Consult - Zosyn Dosing  Will Gamez is a 44 y.o. male who has been consulted to dose Zosyn for  Intra-abdominal Infection .    Current Antimicrobial Therapy    Anti-Infectives (From admission, onward)      Ordered     Dose/Rate Route Frequency Start Stop    09/01/24 1432  Pharmacy to Dose Zosyn        Ordering Provider: Kai Pérez,      Does not apply Continuous PRN 09/01/24 1432 09/06/24 1431    08/31/24 1916  piperacillin-tazobactam (ZOSYN) IVPB 4.5 g IVPB in 100 mL NS (VTB)        Ordering Provider: Gil Lanza MD    4.5 g Intravenous Once 08/31/24 1932 08/31/24 2045            Microbiology Results (last 10 days)       ** No results found for the last 240 hours. **             Allergies  Patient has no known allergies.    Relevant clinical data and objective history reviewed:  Creatinine   Date Value Ref Range Status   09/01/2024 1.27 0.76 - 1.27 mg/dL Final     Estimated Creatinine Clearance: 81.6 mL/min (by C-G formula based on SCr of 1.27 mg/dL).  I/O last 3 completed shifts:  In: 100 [IV Piggyback:100]  Out: 300 [Urine:300]  Patient weight: 95.8 kg (211 lb 3.2 oz)    Asessment/Plan  Initiate Zosyn 3.375g IV every 8 hours  Pharmacy will monitor Mr. Gamez's renal function and clinical status and adjust the Zosyn dose and/or frequency as needed.    Thanks,   Yolanda Randhawa, PharmD  9/1/2024  14:34 EDT

## 2024-09-01 NOTE — PAYOR COMM NOTE
"TO:BC  FROM:CARMEN SPANGLER, RN PHONE [phone]  CLINICALS REF# [id number]    Will Gamez (44 y.o. Male)       Date of Birth   1980    Social Security Number       Address   [address] [place] [address]    Home Phone   [phone]    MRN   [id number]       Shinto   None    Marital Status                               Admission Date   24    Admission Type   Emergency    Admitting Provider   Juan Escalante DO    Attending Provider   Kai Pérez DO    Department, Room/Bed   [facility] TELEMETRY 3 321/       Discharge Date       Discharge Disposition       Discharge Destination                                 Attending Provider: Kai Pérez DO    Allergies: No Known Allergies    Isolation: None   Infection: None   Code Status: CPR    Ht: 172.7 cm (68\")   Wt: 95.8 kg (211 lb 3.2 oz)    Admission Cmt: None   Principal Problem: Abscess of sigmoid colon due to diverticulitis [K57.20]                   Active Insurance as of 2024       Primary Coverage       Payor Plan Insurance Group Employer/Plan Group    [facility] [employer] [id number]       Payor Plan Address Payor Plan Phone Number Payor Plan Fax Number Effective Dates    [address] [phone]  2015 - None Entered    [place] [address]         Subscriber Name Subscriber Birth Date Member ID       WILL GAMEZ 1980 [id number]                     Emergency Contacts        (Rel.) Home Phone Work Phone Mobile Phone    LG GAMEZ (Spouse) [phone] -- [phone]                 History & Physical        Juan Escalante DO at 24 [facility]              [facility] HOSPITALIST   HISTORY AND PHYSICAL      Name:  Will Gamez   Age:  44 y.o.  Sex:  male  :  1980  MRN:  [id number]   Visit Number:  [id number]  Admission Date:  2024  Date Of Service:  24  Primary Care " Physician:  Estefany Augustine APRN    Chief Complaint:   Abdominal pain      History Of Presenting Illness:    Patient is a 44-year-old male with no significant past medical history who presented today for worsening abdominal pain.  Patient shared that over the last few months he has been suffering with mild intermittent right lower quadrant and suprapubic abdominal pain.  To evaluate his chronic pain, Dr. Joel performed colonoscopy on 8/29/2024 where diverticulosis was noted and 2 polyps were removed.  Patient was doing well until this morning where he felt progressive abdominal pain and lack of appetite.  He had a normal bowel movement in the morning.  He had no appetite through the day and stated he only had 4 bites of chicken.     Upon evaluation in the emergency room, patient had mild elevation in WBC and CT abdomen presented 3 cm abdominal abscess with areas of mesenteric gas manage right hemipelvis.  Dr. Moran was consulted and advised on admission and n.p.o. after midnight for possible procedure in AM.     Review Of Systems:  Review of Systems   Constitutional:  Negative for chills, fatigue and fever.   HENT:  Negative for congestion, ear pain, rhinorrhea, sinus pressure and sore throat.    Eyes:  Negative for visual disturbance.   Respiratory:  Negative for cough, chest tightness, shortness of breath and wheezing.    Cardiovascular:  Negative for chest pain, palpitations and leg swelling.   Gastrointestinal:  Positive for abdominal pain. Negative for blood in stool, constipation, diarrhea, nausea and vomiting.   Endocrine: Negative for polydipsia and polyuria.   Genitourinary:  Negative for dysuria and hematuria.   Musculoskeletal:  Negative for arthralgias and back pain.   Skin:  Negative for rash.   Neurological:  Negative for dizziness, light-headedness, numbness and headaches.   Psychiatric/Behavioral:  Negative for dysphoric mood and sleep disturbance. The patient is not nervous/anxious.          Past Medical History:    History reviewed. No pertinent past medical history.    Past Surgical history:    Past Surgical History:   Procedure Laterality Date    COLONOSCOPY N/A 8/29/2024    Procedure: COLONOSCOPY with biosy and polypectomy;  Surgeon: Joshua Joel MD;  Location: [facility] ENDOSCOPY;  Service: Gastroenterology;  Laterality: N/A;       Social History:    Social History     Socioeconomic History    Marital status:    Tobacco Use    Smoking status: Never    Smokeless tobacco: Former     Types: Chew   Vaping Use    Vaping status: Never Used   Substance and Sexual Activity    Alcohol use: Yes     Alcohol/week: 2.0 - 3.0 standard drinks of alcohol     Types: 2 - 3 Cans of beer per week     Comment: every other week    Drug use: No    Sexual activity: Defer         Family History:    Family History   Problem Relation Age of Onset    Hypertension Mother     Hypertension Brother        Allergies:      Patient has no known allergies.    Home Medications:    Prior to Admission Medications       Prescriptions Last Dose Informant Patient Reported? Taking?    cyclobenzaprine (FLEXERIL) 5 MG tablet  Self No No    Take 1 tablet by mouth At Night As Needed for Muscle Spasms.    nystatin (MYCOSTATIN) 100,000 unit/mL suspension   No No    Swish and swallow 5 mL 4 (Four) Times a Day.    Patient not taking:  Reported on 8/28/2024               ED Medications:    Medications   sodium chloride 0.9 % flush 10 mL (has no administration in time range)   morphine injection 4 mg (4 mg Intravenous Given 8/31/24 1809)   ondansetron (ZOFRAN) injection 4 mg (4 mg Intravenous Given 8/31/24 1809)   lactated ringers bolus 1,000 mL (1,000 mL Intravenous New Bag 8/31/24 1809)   iopamidol (ISOVUE-300) 61 % injection 100 mL (100 mL Intravenous Given 8/31/24 1831)   piperacillin-tazobactam (ZOSYN) IVPB 4.5 g IVPB in 100 mL NS (VTB) (4.5 g Intravenous New Bag 8/31/24 2007)       Vital Signs:    Temp:  [97.8 °F (36.6 °C)] 97.8 °F  (36.6 °C)  Heart Rate:  [75-95] 85  Resp:  [18] 18  BP: ()/(64-90) 110/71        08/31/24  1617   Weight: 93 kg (205 lb)       Body mass index is 31.17 kg/m².    Physical Exam:  Physical Exam  Vitals and nursing note reviewed.   Constitutional:       Appearance: Normal appearance. He is well-developed. He is obese.   HENT:      Head: Normocephalic and atraumatic.      Nose: Nose normal.      Mouth/Throat:      Mouth: Mucous membranes are moist.      Pharynx: No oropharyngeal exudate.   Eyes:      General: No scleral icterus.     Conjunctiva/sclera: Conjunctivae normal.      Pupils: Pupils are equal, round, and reactive to light.   Neck:      Thyroid: No thyromegaly.   Cardiovascular:      Rate and Rhythm: Normal rate and regular rhythm.      Heart sounds: Normal heart sounds. No murmur heard.     No friction rub. No gallop.   Pulmonary:      Effort: Pulmonary effort is normal. No respiratory distress.      Breath sounds: Normal breath sounds. No wheezing.   Abdominal:      General: Bowel sounds are normal. There is no distension.      Palpations: Abdomen is soft.      Tenderness: There is abdominal tenderness.   Musculoskeletal:         General: No deformity or signs of injury.      Cervical back: Normal range of motion and neck supple.   Lymphadenopathy:      Cervical: No cervical adenopathy.   Skin:     General: Skin is warm and dry.      Findings: No rash.   Neurological:      Mental Status: He is alert and oriented to person, place, and time.   Psychiatric:         Mood and Affect: Mood normal.         Behavior: Behavior normal.         EKG:    Sinus rhythm with incomplete right bundle darrel block, ventricular rate 70    Labs:    Lab Results (last 24 hours)       Procedure Component Value Units Date/Time    Lactic Acid, Plasma [[id number]]  (Abnormal) Collected: 08/31/24 1929    Specimen: Blood Updated: 08/31/24 2009     Lactate 2.1 mmol/L     Blood Culture - Blood, Arm, Left [[id number]] Collected:  08/31/24 1929    Specimen: Blood from Arm, Left Updated: 08/31/24 1933    Blood Culture - Blood, Hand, Left [[id number]] Collected: 08/31/24 1920    Specimen: Blood from Hand, Left Updated: 08/31/24 1933    Urinalysis With Microscopic If Indicated (No Culture) - Urine, Clean Catch [[id number]]  (Abnormal) Collected: 08/31/24 1803    Specimen: Urine, Clean Catch Updated: 08/31/24 1808     Color, UA Yellow     Appearance, UA Clear     pH, UA >=9.0     Specific Gravity, UA 1.014     Glucose, UA Negative     Ketones, UA Negative     Bilirubin, UA Negative     Blood, UA Negative     Protein, UA Negative     Leuk Esterase, UA Negative     Nitrite, UA Negative     Urobilinogen, UA 0.2 E.U./dL    Narrative:      Urine microscopic not indicated.    Lipase [[id number]]  (Normal) Collected: 08/31/24 1700    Specimen: Blood Updated: 08/31/24 1732     Lipase 19 U/L     Comprehensive Metabolic Panel [[id number]]  (Abnormal) Collected: 08/31/24 1700    Specimen: Blood Updated: 08/31/24 1732     Glucose 102 mg/dL      BUN 10 mg/dL      Creatinine 1.13 mg/dL      Sodium 138 mmol/L      Potassium 3.9 mmol/L      Chloride 101 mmol/L      CO2 22.9 mmol/L      Calcium 9.9 mg/dL      Total Protein 7.5 g/dL      Albumin 4.3 g/dL      ALT (SGPT) 34 U/L      AST (SGOT) 27 U/L      Alkaline Phosphatase 79 U/L      Total Bilirubin 0.6 mg/dL      Globulin 3.2 gm/dL      A/G Ratio 1.3 g/dL      BUN/Creatinine Ratio 8.8     Anion Gap 14.1 mmol/L      eGFR 81.2 mL/min/1.73     Narrative:      GFR Normal >60  Chronic Kidney Disease <60  Kidney Failure <15      [place] Draw [[id number]] Collected: 08/31/24 1700    Specimen: Blood Updated: 08/31/24 1716    Narrative:      The following orders were created for panel order [place] Draw.  Procedure                               Abnormality         Status                     ---------                               -----------         ------                     Green Top (Gel)[[id number]]                                   Final result               Lavender Top[[id number]]                                     Final result               Gold Top - SST[[id number]]                                   Final result               Light Blue Top[[id number]]                                   Final result                 Please view results for these tests on the individual orders.    Green Top (Gel) [[id number]] Collected: 08/31/24 1700    Specimen: Blood Updated: 08/31/24 1716     Extra Tube Hold for add-ons.     Comment: Auto resulted.       Lavender Top [[id number]] Collected: 08/31/24 1700    Specimen: Blood Updated: 08/31/24 1716     Extra Tube hold for add-on     Comment: Auto resulted       Gold Top - SST [[id number]] Collected: 08/31/24 1700    Specimen: Blood Updated: 08/31/24 1716     Extra Tube Hold for add-ons.     Comment: Auto resulted.       Light Blue Top [[id number]] Collected: 08/31/24 1700    Specimen: Blood Updated: 08/31/24 1716     Extra Tube Hold for add-ons.     Comment: Auto resulted       CBC & Differential [[id number]]  (Abnormal) Collected: 08/31/24 1700    Specimen: Blood Updated: 08/31/24 1710    Narrative:      The following orders were created for panel order CBC & Differential.  Procedure                               Abnormality         Status                     ---------                               -----------         ------                     CBC Auto Differential[[id number]]        Abnormal            Final result                 Please view results for these tests on the individual orders.    CBC Auto Differential [[id number]]  (Abnormal) Collected: 08/31/24 1700    Specimen: Blood Updated: 08/31/24 1710     WBC 12.89 10*3/mm3      RBC 4.74 10*6/mm3      Hemoglobin 15.0 g/dL      Hematocrit 43.0 %      MCV 90.7 fL      MCH 31.6 pg      MCHC 34.9 g/dL      RDW 12.4 %      RDW-SD 40.7 fl      MPV 9.4 fL      Platelets 195 10*3/mm3      Neutrophil % 74.8 %      Lymphocyte % 16.2 %      Monocyte % 6.4 %       Eosinophil % 1.8 %      Basophil % 0.4 %      Immature Grans % 0.4 %      Neutrophils, Absolute 9.65 10*3/mm3      Lymphocytes, Absolute 2.09 10*3/mm3      Monocytes, Absolute 0.82 10*3/mm3      Eosinophils, Absolute 0.23 10*3/mm3      Basophils, Absolute 0.05 10*3/mm3      Immature Grans, Absolute 0.05 10*3/mm3      nRBC 0.0 /100 WBC             Radiology:    Imaging Results (Last 72 Hours)       Procedure Component Value Units Date/Time    CT Abdomen Pelvis With Contrast [[id number]] Collected: 08/31/24 1910     Updated: 08/31/24 1916    Addenda:        ADDENDUM REPORT    ADDENDUM:  This report was discussed with Cait Winn RN on Aug 31, 2024   19:15:00 EDT.    Authenticated and Electronically Signed by Laura Gilliland MD on  08/31/2024 07:15:32 PM  Signed: 08/31/24 1915 by Laura Gilliland MD    Narrative:      FINAL REPORT    TECHNIQUE:  null    CLINICAL HISTORY:  Lower abd, RLQ abd pain, eval diverticulitis, colitis, kidney  stone, appendici    COMPARISON:  null    FINDINGS:  CT abdomen and pelvis with contrast    Comparison: None    Findings:    Mild dependent changes at the lung bases.    Unremarkable gallbladder and solid organs. No urolithiasis.    3.1 x 1.8 x 1.2 cm abscess containing fluid and a pocket of gas within the mesentery of the right hemipelvis. There is severe edema of the adjacent mesentery. There is edema of adjacent portions of the sigmoid colon and distal ileum. There is edema of a   sigmoidal diverticulum and several additional tiny pockets of mesenteric gas are present interposed between the abscess and the sigmoid colon. The proximal appendix is unremarkable, but the distal appendix extends into the area of edema and does not well   visualized.    There is an umbilical hernia containing fat.    Unremarkable prostate gland and urinary bladder. Trace pelvic free fluid.    No acute fracture.      Impression:      IMPRESSION:    3.1 cm abscess and several tiny pockets of  mesenteric gas within the mesentery of the right hemipelvis. This is most likely on the basis of perforated diverticulitis of the sigmoid colon, but appendix tip perforation is not completely excluded. There is   secondary edema of an adjacent portion of the distal ileum.    Authenticated and Electronically Signed by Laura Gilliland MD on  2024 07:10:08 PM            Assessment:    Abscess of sigmoid colon due to diverticulitis      Plan:   Sigmoid colon diverticulitis and abscess  - 3.1 x 1.8 x 1.2 cm abscess as well as right hemipelvic mesenteric gas on CT abdomen pelvis  - Patient had recent colonoscopy on 2024  - Dr. Moran has been consulted from the emergency room.  Plan for possible surgical intervention in the morning.  Patient to be on clear liquids today.  N.p.o. after midnight.  -Start normal saline 125 mL/h for hydration  -IV morphine for pain control  -IV Zofran for nausea    Juan Escalante DO  24  20:32 EDT       Electronically signed by Juan Escalante DO at 24          Emergency Department Notes        Lupis Abraham RN at 24          Gave report to ELEANOR Macias at this time.     Electronically signed by Lupis Abraham RN at 24       Lupis Abraham RN at 24           Lab at bedside for blood cultures at this time.     Electronically signed by Lupis Abraham RN at 24       Gil Lanza MD at 24 180                   [facility] 3  Emergency Department Encounter  Emergency Medicine Physician Note       Pt Name: Will Gamez  MRN: [id number]  Pt :   1980  Room Number:  321/1  Date of encounter:  2024  PCP: Estefany Augustine APRN  ED Provider: Gil Lanza MD    Historian: Patient      HPI:  Chief Complaint: Abdominal pain        Context: Will Gamez is a 44 y.o. male who presents to the ED for abdominal pain.  Symptoms started over the past few days.  States it is  mostly lower abdomen and right lower quadrant.  Reports worsened this morning.  He has associated nausea.  No fevers but has associated chills.  He had colonoscopy performed 2 days ago.      PAST MEDICAL HISTORY  History reviewed. No pertinent past medical history.      PAST SURGICAL HISTORY  Past Surgical History:   Procedure Laterality Date    COLONOSCOPY N/A 8/29/2024    Procedure: COLONOSCOPY with biosy and polypectomy;  Surgeon: Joshua Joel MD;  Location: [facility] ENDOSCOPY;  Service: Gastroenterology;  Laterality: N/A;         FAMILY HISTORY  Family History   Problem Relation Age of Onset    Hypertension Mother     Hypertension Brother          SOCIAL HISTORY  Social History     Socioeconomic History    Marital status:    Tobacco Use    Smoking status: Never    Smokeless tobacco: Former     Types: Chew   Vaping Use    Vaping status: Never Used   Substance and Sexual Activity    Alcohol use: Yes     Alcohol/week: 2.0 - 3.0 standard drinks of alcohol     Types: 2 - 3 Cans of beer per week     Comment: every other week    Drug use: No    Sexual activity: Defer         ALLERGIES  Patient has no known allergies.        REVIEW OF SYSTEMS  Systems reviewed and negative      PHYSICAL EXAM    I have reviewed the triage vital signs and nursing notes.    ED Triage Vitals [08/31/24 1617]   Temp Heart Rate Resp BP SpO2   97.8 °F (36.6 °C) 82 18 91/64 100 %      Temp src Heart Rate Source Patient Position BP Location FiO2 (%)   -- -- -- -- --       Physical Exam  Constitutional:       General: He is not in acute distress.  HENT:      Head: Normocephalic.   Cardiovascular:      Rate and Rhythm: Normal rate.      Pulses: Normal pulses.   Pulmonary:      Effort: Pulmonary effort is normal. No respiratory distress.   Abdominal:      Tenderness:  in the right lower quadrant and suprapubic area   Musculoskeletal:         General: No deformity.      Cervical back: Neck supple.   Skin:     General: Skin is warm.    Neurological:      General: No focal deficit present.      Mental Status: He is alert.         LAB RESULTS  Recent Results (from the past 24 hour(s))   Comprehensive Metabolic Panel    Collection Time: 08/31/24  5:00 PM    Specimen: Blood   Result Value Ref Range    Glucose 102 (H) 65 - 99 mg/dL    BUN 10 6 - 20 mg/dL    Creatinine 1.13 0.76 - 1.27 mg/dL    Sodium 138 136 - 145 mmol/L    Potassium 3.9 3.5 - 5.2 mmol/L    Chloride 101 98 - 107 mmol/L    CO2 22.9 22.0 - 29.0 mmol/L    Calcium 9.9 8.6 - 10.5 mg/dL    Total Protein 7.5 6.0 - 8.5 g/dL    Albumin 4.3 3.5 - 5.2 g/dL    ALT (SGPT) 34 1 - 41 U/L    AST (SGOT) 27 1 - 40 U/L    Alkaline Phosphatase 79 39 - 117 U/L    Total Bilirubin 0.6 0.0 - 1.2 mg/dL    Globulin 3.2 gm/dL    A/G Ratio 1.3 g/dL    BUN/Creatinine Ratio 8.8 7.0 - 25.0    Anion Gap 14.1 5.0 - 15.0 mmol/L    eGFR 81.2 >60.0 mL/min/1.73   Lipase    Collection Time: 08/31/24  5:00 PM    Specimen: Blood   Result Value Ref Range    Lipase 19 13 - 60 U/L   Green Top (Gel)    Collection Time: 08/31/24  5:00 PM   Result Value Ref Range    Extra Tube Hold for add-ons.    Lavender Top    Collection Time: 08/31/24  5:00 PM   Result Value Ref Range    Extra Tube hold for add-on    Gold Top - SST    Collection Time: 08/31/24  5:00 PM   Result Value Ref Range    Extra Tube Hold for add-ons.    Light Blue Top    Collection Time: 08/31/24  5:00 PM   Result Value Ref Range    Extra Tube Hold for add-ons.    CBC Auto Differential    Collection Time: 08/31/24  5:00 PM    Specimen: Blood   Result Value Ref Range    WBC 12.89 (H) 3.40 - 10.80 10*3/mm3    RBC 4.74 4.14 - 5.80 10*6/mm3    Hemoglobin 15.0 13.0 - 17.7 g/dL    Hematocrit 43.0 37.5 - 51.0 %    MCV 90.7 79.0 - 97.0 fL    MCH 31.6 26.6 - 33.0 pg    MCHC 34.9 31.5 - 35.7 g/dL    RDW 12.4 12.3 - 15.4 %    RDW-SD 40.7 37.0 - 54.0 fl    MPV 9.4 6.0 - 12.0 fL    Platelets 195 140 - 450 10*3/mm3    Neutrophil % 74.8 42.7 - 76.0 %    Lymphocyte % 16.2 (L) 19.6 -  45.3 %    Monocyte % 6.4 5.0 - 12.0 %    Eosinophil % 1.8 0.3 - 6.2 %    Basophil % 0.4 0.0 - 1.5 %    Immature Grans % 0.4 0.0 - 0.5 %    Neutrophils, Absolute 9.65 (H) 1.70 - 7.00 10*3/mm3    Lymphocytes, Absolute 2.09 0.70 - 3.10 10*3/mm3    Monocytes, Absolute 0.82 0.10 - 0.90 10*3/mm3    Eosinophils, Absolute 0.23 0.00 - 0.40 10*3/mm3    Basophils, Absolute 0.05 0.00 - 0.20 10*3/mm3    Immature Grans, Absolute 0.05 0.00 - 0.05 10*3/mm3    nRBC 0.0 0.0 - 0.2 /100 WBC   Urinalysis With Microscopic If Indicated (No Culture) - Urine, Clean Catch    Collection Time: 08/31/24  6:03 PM    Specimen: Urine, Clean Catch   Result Value Ref Range    Color, UA Yellow Yellow, Straw    Appearance, UA Clear Clear    pH, UA >=9.0 (H) 5.0 - 8.0    Specific Gravity, UA 1.014 1.005 - 1.030    Glucose, UA Negative Negative    Ketones, UA Negative Negative    Bilirubin, UA Negative Negative    Blood, UA Negative Negative    Protein, UA Negative Negative    Leuk Esterase, UA Negative Negative    Nitrite, UA Negative Negative    Urobilinogen, UA 0.2 E.U./dL 0.2 - 1.0 E.U./dL   Lactic Acid, Plasma    Collection Time: 08/31/24  7:29 PM    Specimen: Blood   Result Value Ref Range    Lactate 2.1 (C) 0.5 - 2.0 mmol/L   STAT Lactic Acid, Reflex    Collection Time: 08/31/24 10:17 PM    Specimen: Blood   Result Value Ref Range    Lactate 1.8 0.5 - 2.0 mmol/L       If labs were ordered, I independently reviewed the results and considered them in treating the patient.        RADIOLOGY  CT Abdomen Pelvis With Contrast    Addendum Date: 8/31/2024    ADDENDUM REPORT ADDENDUM: This report was discussed with Cait Winn RN on Aug 31, 2024 19:15:00 EDT. Authenticated and Electronically Signed by Laura Gilliland MD on 08/31/2024 07:15:32 PM    Result Date: 8/31/2024  FINAL REPORT TECHNIQUE: null CLINICAL HISTORY: Lower abd, RLQ abd pain, eval diverticulitis, colitis, kidney stone, appendici COMPARISON: null FINDINGS: CT abdomen and pelvis with  contrast Comparison: None Findings: Mild dependent changes at the lung bases. Unremarkable gallbladder and solid organs. No urolithiasis. 3.1 x 1.8 x 1.2 cm abscess containing fluid and a pocket of gas within the mesentery of the right hemipelvis. There is severe edema of the adjacent mesentery. There is edema of adjacent portions of the sigmoid colon and distal ileum. There is edema of a sigmoidal diverticulum and several additional tiny pockets of mesenteric gas are present interposed between the abscess and the sigmoid colon. The proximal appendix is unremarkable, but the distal appendix extends into the area of edema and does not well  visualized. There is an umbilical hernia containing fat. Unremarkable prostate gland and urinary bladder. Trace pelvic free fluid. No acute fracture.     IMPRESSION: 3.1 cm abscess and several tiny pockets of mesenteric gas within the mesentery of the right hemipelvis. This is most likely on the basis of perforated diverticulitis of the sigmoid colon, but appendix tip perforation is not completely excluded. There is secondary edema of an adjacent portion of the distal ileum. Authenticated and Electronically Signed by Laura Gilliland MD on 08/31/2024 07:10:08 PM     PROCEDURES    Procedures    ECG 12 Lead Other; dizziness   Final Result          MEDICATIONS GIVEN IN ER    Medications   sodium chloride 0.9 % flush 10 mL (has no administration in time range)   sodium chloride 0.9 % flush 10 mL (10 mL Intravenous Given 8/31/24 2119)   sodium chloride 0.9 % flush 10 mL (has no administration in time range)   sodium chloride 0.9 % infusion 40 mL (has no administration in time range)   sennosides-docusate (PERICOLACE) 8.6-50 MG per tablet 2 tablet (has no administration in time range)     And   polyethylene glycol (MIRALAX) packet 17 g (has no administration in time range)     And   bisacodyl (DULCOLAX) EC tablet 5 mg (has no administration in time range)     And   bisacodyl (DULCOLAX)  suppository 10 mg (has no administration in time range)   ondansetron (ZOFRAN) injection 4 mg (has no administration in time range)   sodium chloride 0.9 % infusion (125 mL/hr Intravenous New Bag 8/31/24 2132)   Morphine sulfate (PF) injection 2 mg (2 mg Intravenous Given 8/31/24 2112)     And   naloxone (NARCAN) injection 0.4 mg (has no administration in time range)   morphine injection 4 mg (4 mg Intravenous Given 8/31/24 1809)   ondansetron (ZOFRAN) injection 4 mg (4 mg Intravenous Given 8/31/24 1809)   lactated ringers bolus 1,000 mL (1,000 mL Intravenous New Bag 8/31/24 1809)   iopamidol (ISOVUE-300) 61 % injection 100 mL (100 mL Intravenous Given 8/31/24 1831)   piperacillin-tazobactam (ZOSYN) IVPB 4.5 g IVPB in 100 mL NS (VTB) (0 g Intravenous Stopped 8/31/24 2045)   morphine injection 4 mg (4 mg Intravenous Given 8/31/24 2335)         MEDICAL DECISION MAKING, PROGRESS, and CONSULTS    All labs, if obtained, have been independently reviewed by me.  All radiology studies, if obtained, have been reviewed by me and the radiologist dictating the report.  All EKG's, if obtained, have been independently viewed and interpreted by me.      Discussion below represents my analysis of pertinent findings related to patient's condition, differential diagnosis, treatment plan and final disposition.                         Differential diagnosis:    Appendicitis, diverticulitis, abscess, UTI, kidney stone, others.      Additional sources:    - Discussed/ obtained information from independent historians: Family member at bedside    - External (non-ED) record review: Colonoscopy report 8/29/2024    - Chronic or social conditions impacting care:      - Shared decision making:        Orders placed during this visit:  Orders Placed This Encounter   Procedures    Blood Culture - Blood,    Blood Culture - Blood,    CT Abdomen Pelvis With Contrast    [place] Draw    Comprehensive Metabolic Panel    Lipase    Urinalysis With  Microscopic If Indicated (No Culture) - Urine, Clean Catch    CBC Auto Differential    Lactic Acid, Plasma    STAT Lactic Acid, Reflex    Comprehensive Metabolic Panel    CBC (No Diff)    NPO Diet NPO Type: Strict NPO    Undress & Gown    Vital Signs    Intake & Output    Weigh Patient    Oral Care    Saline Lock & Maintain IV Access    Place Sequential Compression Device    Maintain Sequential Compression Device    Activity - Ad Maria A    Opioid Administration - Document EtCO2 and / or SpO2 With Each Set of Vitals & Any Change in Patient Status    Opioid Administration - Notify Provider Hypercapnic Monitoring    Opioid Administration - Continuous Pulse Oximetry (SpO2)    Code Status and Medical Interventions: CPR (Attempt to Resuscitate); Full Support    ECG 12 Lead Other; dizziness    Insert Peripheral IV    Insert Peripheral IV    Inpatient Admission    CBC & Differential    Green Top (Gel)    Lavender Top    Gold Top - SST    Light Blue Top         Additional orders considered but not ordered:      ED Course/MDM Discussion:    Patient is a 44-year-old male who presented for lower abdominal pain.  He is in no acute distress.  Vital signs are reassuring.  He has a mild leukocytosis of 12.89.  He has a mild lactic acidosis of 2.1.  CT imaging obtained shows fat stranding in the lower pelvis about the sigmoid on my interpretation of imaging.  Radiology interpretation notes pelvic abscess likely secondary to perforated sigmoid diverticulitis versus less likely appendicitis.  Patient started empirically on Zosyn.  Blood cultures ordered.  Discussed with Dr. Moran who states n.p.o. after midnight and recommendations for hospital medicine admission.  Discussed with Dr. Escalante for admission.                    Consultants:    Hospitalist  Dr. Moran general surgery    Shared Decision Making:  After my consideration of clinical presentation and any laboratory/radiology studies obtained, I discussed the findings with the  patient/patient representative who is in agreement with the treatment plan and the final disposition.   Risks and benefits of discharge and/or observation/admission were discussed.         AS OF 23:46 EDT VITALS:    BP - 101/62  HR - 83  TEMP - 99.1 °F (37.3 °C) (Oral)  O2 SATS - 95%                  DIAGNOSIS  Final diagnoses:   Sigmoid diverticulitis         DISPOSITION  ED Disposition       ED Disposition   Decision to Admit    Condition   --    Comment   Level of Care: Med/Surg [1]   Diagnosis: Abscess of sigmoid colon due to diverticulitis [[id number]]   Admitting Physician: JUAN ESCALANTE [[id number]]   Certification: I Certify That Inpatient Hospital Services Are Medically Necessary For Greater Than 2 Midnights                     Please note that portions of this document were completed with voice recognition software.        Gil Lanza MD  08/31/24 2351      Electronically signed by Gil Lanza MD at 08/31/24 2351       Vital Signs (last day)       Date/Time Temp Temp src Pulse Resp BP Patient Position SpO2    09/01/24 0700 98.3 (36.8) Oral 60 20 96/59 Lying 94    09/01/24 0300 99.9 (37.7) Oral 73 24 107/73 Lying 99    08/31/24 2300 99.1 (37.3) Oral 83 18 101/62 Lying --    08/31/24 2108 99.2 (37.3) Oral 85 20 114/73 Lying 95    08/31/24 2027 -- -- 85 18 110/71 Lying 97    08/31/24 1900 -- -- 95 -- 114/70 -- 97    08/31/24 1800 -- -- 85 -- 119/80 -- 100    08/31/24 1730 -- -- 75 -- 145/90 -- 100    08/31/24 1700 -- -- 76 -- 113/82 -- 100    08/31/24 1617 97.8 (36.6) -- 82 18 91/64 -- 100          Current Facility-Administered Medications   Medication Dose Route Frequency Provider Last Rate Last Admin    sennosides-docusate (PERICOLACE) 8.6-50 MG per tablet 2 tablet  2 tablet Oral BID PRN Juan Escalante DO        And    polyethylene glycol (MIRALAX) packet 17 g  17 g Oral Daily PRN Juan Escalante DO        And    bisacodyl (DULCOLAX) EC tablet 5 mg  5 mg Oral Daily PRN Juan Escalante DO         And    bisacodyl (DULCOLAX) suppository 10 mg  10 mg Rectal Daily PRN Boris, Juan Issac, DO        morphine injection 4 mg  4 mg Intravenous Q2H PRN Boris, Juan Issac, DO   4 mg at 09/01/24 0850    Morphine sulfate (PF) injection 2 mg  2 mg Intravenous Q4H PRN Boris, Juan Issac, DO   2 mg at 09/01/24 0150    And    naloxone (NARCAN) injection 0.4 mg  0.4 mg Intravenous Q5 Min PRN Boris, Juan Issac, DO        ondansetron (ZOFRAN) injection 4 mg  4 mg Intravenous Q6H PRN Boris, Juan Issac, DO        piperacillin-tazobactam (ZOSYN) IVPB 3.375 g IVPB in 100 mL NS (VTB)  3.375 g Intravenous Q6H Boris, Juan Issac, DO   3.375 g at 09/01/24 0951    sodium chloride 0.9 % flush 10 mL  10 mL Intravenous PRN Gil Lanza MD        sodium chloride 0.9 % flush 10 mL  10 mL Intravenous Q12H Boris, Juan Issac, DO   10 mL at 08/31/24 2119    sodium chloride 0.9 % flush 10 mL  10 mL Intravenous PRN Boris, Juan Issac, DO        sodium chloride 0.9 % infusion 40 mL  40 mL Intravenous PRN Boris, Juan Issac, DO        sodium chloride 0.9 % infusion  125 mL/hr Intravenous Continuous Boris, Juan Issac,  mL/hr at 09/01/24 0850 125 mL/hr at 09/01/24 0850     Lab Results (last 24 hours)       Procedure Component Value Units Date/Time    Comprehensive Metabolic Panel [[id number]]  (Abnormal) Collected: 09/01/24 0447    Specimen: Blood Updated: 09/01/24 0543     Glucose 104 mg/dL      BUN 11 mg/dL      Creatinine 1.27 mg/dL      Sodium 137 mmol/L      Potassium 3.9 mmol/L      Chloride 104 mmol/L      CO2 21.7 mmol/L      Calcium 9.2 mg/dL      Total Protein 6.6 g/dL      Albumin 3.7 g/dL      ALT (SGPT) 26 U/L      AST (SGOT) 20 U/L      Alkaline Phosphatase 64 U/L      Total Bilirubin 1.4 mg/dL      Globulin 2.9 gm/dL      A/G Ratio 1.3 g/dL      BUN/Creatinine Ratio 8.7     Anion Gap 11.3 mmol/L      eGFR 70.6 mL/min/1.73     Narrative:      GFR Normal >60  Chronic Kidney Disease <60  Kidney Failure <15      Lactate  Dehydrogenase [[id number]]  (Abnormal) Collected: 09/01/24 0447    Specimen: Blood Updated: 09/01/24 0543      U/L     Lactic Acid, Plasma [[id number]]  (Normal) Collected: 09/01/24 0447    Specimen: Blood Updated: 09/01/24 0535     Lactate 0.8 mmol/L     CBC (No Diff) [[id number]]  (Abnormal) Collected: 09/01/24 0447    Specimen: Blood Updated: 09/01/24 0457     WBC 13.40 10*3/mm3      RBC 4.33 10*6/mm3      Hemoglobin 13.8 g/dL      Hematocrit 39.9 %      MCV 92.1 fL      MCH 31.9 pg      MCHC 34.6 g/dL      RDW 12.6 %      RDW-SD 42.8 fl      MPV 9.2 fL      Platelets 169 10*3/mm3     STAT Lactic Acid, Reflex [[id number]]  (Normal) Collected: 08/31/24 2217    Specimen: Blood Updated: 08/31/24 2234     Lactate 1.8 mmol/L     Lactic Acid, Plasma [[id number]]  (Abnormal) Collected: 08/31/24 1929    Specimen: Blood Updated: 08/31/24 2009     Lactate 2.1 mmol/L     Blood Culture - Blood, Arm, Left [[id number]] Collected: 08/31/24 1929    Specimen: Blood from Arm, Left Updated: 08/31/24 1933    Blood Culture - Blood, Hand, Left [[id number]] Collected: 08/31/24 1920    Specimen: Blood from Hand, Left Updated: 08/31/24 1933    Urinalysis With Microscopic If Indicated (No Culture) - Urine, Clean Catch [[id number]]  (Abnormal) Collected: 08/31/24 1803    Specimen: Urine, Clean Catch Updated: 08/31/24 1808     Color, UA Yellow     Appearance, UA Clear     pH, UA >=9.0     Specific Gravity, UA 1.014     Glucose, UA Negative     Ketones, UA Negative     Bilirubin, UA Negative     Blood, UA Negative     Protein, UA Negative     Leuk Esterase, UA Negative     Nitrite, UA Negative     Urobilinogen, UA 0.2 E.U./dL    Narrative:      Urine microscopic not indicated.    Lipase [[id number]]  (Normal) Collected: 08/31/24 1700    Specimen: Blood Updated: 08/31/24 1732     Lipase 19 U/L     Comprehensive Metabolic Panel [[id number]]  (Abnormal) Collected: 08/31/24 1700    Specimen: Blood Updated: 08/31/24 1732     Glucose 102 mg/dL       BUN 10 mg/dL      Creatinine 1.13 mg/dL      Sodium 138 mmol/L      Potassium 3.9 mmol/L      Chloride 101 mmol/L      CO2 22.9 mmol/L      Calcium 9.9 mg/dL      Total Protein 7.5 g/dL      Albumin 4.3 g/dL      ALT (SGPT) 34 U/L      AST (SGOT) 27 U/L      Alkaline Phosphatase 79 U/L      Total Bilirubin 0.6 mg/dL      Globulin 3.2 gm/dL      A/G Ratio 1.3 g/dL      BUN/Creatinine Ratio 8.8     Anion Gap 14.1 mmol/L      eGFR 81.2 mL/min/1.73     Narrative:      GFR Normal >60  Chronic Kidney Disease <60  Kidney Failure <15      [place] Draw [[id number]] Collected: 08/31/24 1700    Specimen: Blood Updated: 08/31/24 1716    Narrative:      The following orders were created for panel order [place] Draw.  Procedure                               Abnormality         Status                     ---------                               -----------         ------                     Green Top (Gel)[[id number]]                                  Final result               Lavender Top[[id number]]                                     Final result               Gold Top - SST[[id number]]                                   Final result               Light Blue Top[[id number]]                                   Final result                 Please view results for these tests on the individual orders.    Green Top (Gel) [[id number]] Collected: 08/31/24 1700    Specimen: Blood Updated: 08/31/24 1716     Extra Tube Hold for add-ons.     Comment: Auto resulted.       Lavender Top [[id number]] Collected: 08/31/24 1700    Specimen: Blood Updated: 08/31/24 1716     Extra Tube hold for add-on     Comment: Auto resulted       Gold Top - SST [[id number]] Collected: 08/31/24 1700    Specimen: Blood Updated: 08/31/24 1716     Extra Tube Hold for add-ons.     Comment: Auto resulted.       Light Blue Top [[id number]] Collected: 08/31/24 1700    Specimen: Blood Updated: 08/31/24 1716     Extra Tube Hold for add-ons.     Comment: Auto resulted       CBC &  Differential [[id number]]  (Abnormal) Collected: 08/31/24 1700    Specimen: Blood Updated: 08/31/24 1710    Narrative:      The following orders were created for panel order CBC & Differential.  Procedure                               Abnormality         Status                     ---------                               -----------         ------                     CBC Auto Differential[[id number]]        Abnormal            Final result                 Please view results for these tests on the individual orders.    CBC Auto Differential [[id number]]  (Abnormal) Collected: 08/31/24 1700    Specimen: Blood Updated: 08/31/24 1710     WBC 12.89 10*3/mm3      RBC 4.74 10*6/mm3      Hemoglobin 15.0 g/dL      Hematocrit 43.0 %      MCV 90.7 fL      MCH 31.6 pg      MCHC 34.9 g/dL      RDW 12.4 %      RDW-SD 40.7 fl      MPV 9.4 fL      Platelets 195 10*3/mm3      Neutrophil % 74.8 %      Lymphocyte % 16.2 %      Monocyte % 6.4 %      Eosinophil % 1.8 %      Basophil % 0.4 %      Immature Grans % 0.4 %      Neutrophils, Absolute 9.65 10*3/mm3      Lymphocytes, Absolute 2.09 10*3/mm3      Monocytes, Absolute 0.82 10*3/mm3      Eosinophils, Absolute 0.23 10*3/mm3      Basophils, Absolute 0.05 10*3/mm3      Immature Grans, Absolute 0.05 10*3/mm3      nRBC 0.0 /100 WBC           Imaging Results (Last 24 Hours)       Procedure Component Value Units Date/Time    CT Abdomen Pelvis With Contrast [[id number]] Collected: 08/31/24 1910     Updated: 08/31/24 1916    Addenda:        ADDENDUM REPORT    ADDENDUM:  This report was discussed with Cait Winn RN on Aug 31, 2024   19:15:00 EDT.    Authenticated and Electronically Signed by Laura Gilliland MD on  08/31/2024 07:15:32 PM  Signed: 08/31/24 1915 by Laura Gilliland MD    Narrative:      FINAL REPORT    TECHNIQUE:  null    CLINICAL HISTORY:  Lower abd, RLQ abd pain, eval diverticulitis, colitis, kidney  stone, appendici    COMPARISON:  null    FINDINGS:  CT abdomen and  pelvis with contrast    Comparison: None    Findings:    Mild dependent changes at the lung bases.    Unremarkable gallbladder and solid organs. No urolithiasis.    3.1 x 1.8 x 1.2 cm abscess containing fluid and a pocket of gas within the mesentery of the right hemipelvis. There is severe edema of the adjacent mesentery. There is edema of adjacent portions of the sigmoid colon and distal ileum. There is edema of a   sigmoidal diverticulum and several additional tiny pockets of mesenteric gas are present interposed between the abscess and the sigmoid colon. The proximal appendix is unremarkable, but the distal appendix extends into the area of edema and does not well   visualized.    There is an umbilical hernia containing fat.    Unremarkable prostate gland and urinary bladder. Trace pelvic free fluid.    No acute fracture.      Impression:      IMPRESSION:    3.1 cm abscess and several tiny pockets of mesenteric gas within the mesentery of the right hemipelvis. This is most likely on the basis of perforated diverticulitis of the sigmoid colon, but appendix tip perforation is not completely excluded. There is   secondary edema of an adjacent portion of the distal ileum.    Authenticated and Electronically Signed by Laura Gilliland MD on  08/31/2024 07:10:08 PM          Physician Progress Notes (last 24 hours)  Notes from 08/31/24 1018 through 09/01/24 1018   No notes of this type exist for this encounter.

## 2024-09-02 LAB
ALBUMIN SERPL-MCNC: 3.4 G/DL (ref 3.5–5.2)
ALBUMIN/GLOB SERPL: 1.1 G/DL
ALP SERPL-CCNC: 61 U/L (ref 39–117)
ALT SERPL W P-5'-P-CCNC: 19 U/L (ref 1–41)
ANION GAP SERPL CALCULATED.3IONS-SCNC: 12.9 MMOL/L (ref 5–15)
AST SERPL-CCNC: 14 U/L (ref 1–40)
BACTERIA UR QL AUTO: ABNORMAL /HPF
BILIRUB SERPL-MCNC: 1.6 MG/DL (ref 0–1.2)
BILIRUB UR QL STRIP: ABNORMAL
BUN SERPL-MCNC: 12 MG/DL (ref 6–20)
BUN/CREAT SERPL: 10.3 (ref 7–25)
CALCIUM SPEC-SCNC: 8.4 MG/DL (ref 8.6–10.5)
CHLORIDE SERPL-SCNC: 103 MMOL/L (ref 98–107)
CLARITY UR: CLEAR
CO2 SERPL-SCNC: 20.1 MMOL/L (ref 22–29)
COLOR UR: ABNORMAL
CREAT SERPL-MCNC: 1.17 MG/DL (ref 0.76–1.27)
DEPRECATED RDW RBC AUTO: 44.5 FL (ref 37–54)
EGFRCR SERPLBLD CKD-EPI 2021: 77.9 ML/MIN/1.73
ERYTHROCYTE [DISTWIDTH] IN BLOOD BY AUTOMATED COUNT: 12.9 % (ref 12.3–15.4)
GLOBULIN UR ELPH-MCNC: 3 GM/DL
GLUCOSE SERPL-MCNC: 75 MG/DL (ref 65–99)
GLUCOSE UR STRIP-MCNC: NEGATIVE MG/DL
HCT VFR BLD AUTO: 37 % (ref 37.5–51)
HGB BLD-MCNC: 12.6 G/DL (ref 13–17.7)
HGB UR QL STRIP.AUTO: NEGATIVE
HYALINE CASTS UR QL AUTO: ABNORMAL /LPF
KETONES UR QL STRIP: ABNORMAL
LEUKOCYTE ESTERASE UR QL STRIP.AUTO: NEGATIVE
MCH RBC QN AUTO: 32.1 PG (ref 26.6–33)
MCHC RBC AUTO-ENTMCNC: 34.1 G/DL (ref 31.5–35.7)
MCV RBC AUTO: 94.4 FL (ref 79–97)
NITRITE UR QL STRIP: NEGATIVE
PH UR STRIP.AUTO: <=5 [PH] (ref 5–8)
PLATELET # BLD AUTO: 139 10*3/MM3 (ref 140–450)
PMV BLD AUTO: 9.7 FL (ref 6–12)
POTASSIUM SERPL-SCNC: 3.7 MMOL/L (ref 3.5–5.2)
PROT SERPL-MCNC: 6.4 G/DL (ref 6–8.5)
PROT UR QL STRIP: ABNORMAL
RBC # BLD AUTO: 3.92 10*6/MM3 (ref 4.14–5.8)
RBC # UR STRIP: ABNORMAL /HPF
REF LAB TEST METHOD: ABNORMAL
SODIUM SERPL-SCNC: 136 MMOL/L (ref 136–145)
SP GR UR STRIP: >=1.03 (ref 1–1.03)
SQUAMOUS #/AREA URNS HPF: ABNORMAL /HPF
UROBILINOGEN UR QL STRIP: ABNORMAL
WBC # UR STRIP: ABNORMAL /HPF
WBC NRBC COR # BLD AUTO: 12.68 10*3/MM3 (ref 3.4–10.8)

## 2024-09-02 PROCEDURE — 99232 SBSQ HOSP IP/OBS MODERATE 35: CPT | Performed by: SURGERY

## 2024-09-02 PROCEDURE — 25810000003 DEXTROSE 5% IN LACTATED RINGERS PER 1000 ML: Performed by: INTERNAL MEDICINE

## 2024-09-02 PROCEDURE — 80053 COMPREHEN METABOLIC PANEL: CPT | Performed by: INTERNAL MEDICINE

## 2024-09-02 PROCEDURE — 25010000002 PIPERACILLIN SOD-TAZOBACTAM PER 1 G: Performed by: INTERNAL MEDICINE

## 2024-09-02 PROCEDURE — 81001 URINALYSIS AUTO W/SCOPE: CPT | Performed by: INTERNAL MEDICINE

## 2024-09-02 PROCEDURE — 25810000003 SODIUM CHLORIDE 0.9 % SOLUTION: Performed by: INTERNAL MEDICINE

## 2024-09-02 PROCEDURE — 99232 SBSQ HOSP IP/OBS MODERATE 35: CPT | Performed by: INTERNAL MEDICINE

## 2024-09-02 PROCEDURE — 25010000002 MORPHINE PER 10 MG: Performed by: INTERNAL MEDICINE

## 2024-09-02 PROCEDURE — 25010000002 KETOROLAC TROMETHAMINE PER 15 MG: Performed by: INTERNAL MEDICINE

## 2024-09-02 PROCEDURE — 85027 COMPLETE CBC AUTOMATED: CPT | Performed by: INTERNAL MEDICINE

## 2024-09-02 RX ORDER — SIMETHICONE 40MG/0.6ML
40 SUSPENSION, DROPS(FINAL DOSAGE FORM)(ML) ORAL 4 TIMES DAILY PRN
Status: DISCONTINUED | OUTPATIENT
Start: 2024-09-02 | End: 2024-09-05 | Stop reason: HOSPADM

## 2024-09-02 RX ORDER — DEXTROSE, SODIUM CHLORIDE, SODIUM LACTATE, POTASSIUM CHLORIDE, AND CALCIUM CHLORIDE 5; .6; .31; .03; .02 G/100ML; G/100ML; G/100ML; G/100ML; G/100ML
100 INJECTION, SOLUTION INTRAVENOUS CONTINUOUS
Status: DISCONTINUED | OUTPATIENT
Start: 2024-09-02 | End: 2024-09-03

## 2024-09-02 RX ADMIN — Medication 10 ML: at 21:05

## 2024-09-02 RX ADMIN — MORPHINE SULFATE 4 MG: 4 INJECTION, SOLUTION INTRAMUSCULAR; INTRAVENOUS at 21:05

## 2024-09-02 RX ADMIN — MORPHINE SULFATE 4 MG: 4 INJECTION, SOLUTION INTRAMUSCULAR; INTRAVENOUS at 03:49

## 2024-09-02 RX ADMIN — Medication 40 MG: at 08:03

## 2024-09-02 RX ADMIN — MORPHINE SULFATE 4 MG: 4 INJECTION, SOLUTION INTRAMUSCULAR; INTRAVENOUS at 17:45

## 2024-09-02 RX ADMIN — Medication 10 ML: at 08:04

## 2024-09-02 RX ADMIN — SODIUM CHLORIDE 125 ML/HR: 9 INJECTION, SOLUTION INTRAVENOUS at 03:43

## 2024-09-02 RX ADMIN — MORPHINE SULFATE 4 MG: 4 INJECTION, SOLUTION INTRAMUSCULAR; INTRAVENOUS at 01:58

## 2024-09-02 RX ADMIN — SODIUM CHLORIDE 175 ML/HR: 9 INJECTION, SOLUTION INTRAVENOUS at 09:47

## 2024-09-02 RX ADMIN — MORPHINE SULFATE 4 MG: 4 INJECTION, SOLUTION INTRAMUSCULAR; INTRAVENOUS at 15:06

## 2024-09-02 RX ADMIN — PIPERACILLIN SODIUM AND TAZOBACTAM SODIUM 3.38 G: 3; .375 INJECTION, POWDER, LYOPHILIZED, FOR SOLUTION INTRAVENOUS at 15:06

## 2024-09-02 RX ADMIN — KETOROLAC TROMETHAMINE 30 MG: 30 INJECTION, SOLUTION INTRAMUSCULAR; INTRAVENOUS at 13:06

## 2024-09-02 RX ADMIN — MORPHINE SULFATE 4 MG: 4 INJECTION, SOLUTION INTRAMUSCULAR; INTRAVENOUS at 08:04

## 2024-09-02 RX ADMIN — PIPERACILLIN SODIUM AND TAZOBACTAM SODIUM 3.38 G: 3; .375 INJECTION, POWDER, LYOPHILIZED, FOR SOLUTION INTRAVENOUS at 21:04

## 2024-09-02 RX ADMIN — Medication 40 MG: at 01:48

## 2024-09-02 RX ADMIN — PIPERACILLIN SODIUM AND TAZOBACTAM SODIUM 3.38 G: 3; .375 INJECTION, POWDER, LYOPHILIZED, FOR SOLUTION INTRAVENOUS at 08:03

## 2024-09-02 RX ADMIN — MORPHINE SULFATE 4 MG: 4 INJECTION, SOLUTION INTRAMUSCULAR; INTRAVENOUS at 10:27

## 2024-09-02 RX ADMIN — SODIUM CHLORIDE, SODIUM LACTATE, POTASSIUM CHLORIDE, CALCIUM CHLORIDE AND DEXTROSE MONOHYDRATE 175 ML/HR: 5; 600; 310; 30; 20 INJECTION, SOLUTION INTRAVENOUS at 15:05

## 2024-09-02 RX ADMIN — Medication 40 MG: at 17:45

## 2024-09-02 RX ADMIN — KETOROLAC TROMETHAMINE 30 MG: 30 INJECTION, SOLUTION INTRAMUSCULAR; INTRAVENOUS at 04:46

## 2024-09-02 NOTE — PAYOR COMM NOTE
"To:  Beba  From: Yanira Justice RN  Phone: [phone]  Fax: [phone]  NPI: [id number]  TIN: [id number]  Member ID: [id number]   MRN: [id number]    Will Gaemz (44 y.o. Male)       Date of Birth   1980    Social Security Number       Address   [facility] [place] [address]    Home Phone   [phone]    MRN   [id number]       Anabaptist   None    Marital Status                               Admission Date   8/31/24    Admission Type   Emergency    Admitting Provider   Juan Escalante DO    Attending Provider   Kai Pérez DO    Department, Room/Bed   [facility] TELEMETRY 3, 321/1       Discharge Date       Discharge Disposition       Discharge Destination                                 Attending Provider: Kai Pérez DO    Allergies: No Known Allergies    Isolation: None   Infection: None   Code Status: CPR    Ht: 172.7 cm (68\")   Wt: 95.8 kg (211 lb 3.2 oz)    Admission Cmt: None   Principal Problem: Abscess of sigmoid colon due to diverticulitis [K57.20]                   Active Insurance as of 8/31/2024       Primary Coverage       Payor Plan Insurance Group Employer/Plan Group    [employer] [facility] [employer] EMPLOYEE [id number]       Payor Plan Address Payor Plan Phone Number Payor Plan Fax Number Effective Dates    [address] [phone]  1/1/2015 - None Entered    [place] [address]         Subscriber Name Subscriber Birth Date Member ID       WILL GAMEZ 1980 [id number]                     Emergency Contacts        (Rel.) Home Phone Work Phone Mobile Phone    LG GAMEZ (Spouse) [phone] -- [phone]              Vital Signs (last day)       Date/Time Temp Temp src Pulse Resp BP Patient Position SpO2    09/02/24 1236 99.5 (37.5) Axillary 89 18 110/75 Sitting 96    09/02/24 0700 98.5 (36.9) Oral 88 18 107/70 Sitting 94    09/02/24 0343 99.9 (37.7) Oral 98 18 110/77 Lying --    09/01/24 " 2300 99.7 (37.6) Oral 103 20 130/89 Lying --    09/01/24 1900 99.9 (37.7) Oral 105 18 118/76 Lying --    09/01/24 1621 98.5 (36.9) Oral 68 18 103/68 Lying 95    09/01/24 1116 98.6 (37) Oral 70 18 100/67 Lying 91    09/01/24 0700 98.3 (36.8) Oral 60 20 96/59 Lying 94    09/01/24 0300 99.9 (37.7) Oral 73 24 107/73 Lying 99          Current Facility-Administered Medications   Medication Dose Route Frequency Provider Last Rate Last Admin    sennosides-docusate (PERICOLACE) 8.6-50 MG per tablet 2 tablet  2 tablet Oral BID PRN Boris, Juan Issac, DO        And    polyethylene glycol (MIRALAX) packet 17 g  17 g Oral Daily PRN Boris, Juan Issac, DO        And    bisacodyl (DULCOLAX) EC tablet 5 mg  5 mg Oral Daily PRN Boris, Juan Issac, DO        And    bisacodyl (DULCOLAX) suppository 10 mg  10 mg Rectal Daily PRN Boris, Juan Issac, DO        ketorolac (TORADOL) injection 30 mg  30 mg Intravenous Q6H PRN Kai Pérez, DO   30 mg at 09/02/24 0446    morphine injection 4 mg  4 mg Intravenous Q2H PRN Boris, Juan Issac, DO   4 mg at 09/02/24 1027    Morphine sulfate (PF) injection 2 mg  2 mg Intravenous Q4H PRN Boris, Juan Issac, DO   2 mg at 09/01/24 1230    And    naloxone (NARCAN) injection 0.4 mg  0.4 mg Intravenous Q5 Min PRN Boris, Juan Issac, DO        ondansetron (ZOFRAN) injection 4 mg  4 mg Intravenous Q6H PRN Boris, Juan Issac, DO        Pharmacy to Dose Zosyn   Does not apply Continuous PRN Kai Pérez, DO        piperacillin-tazobactam (ZOSYN) IVPB 3.375 g IVPB in 100 mL NS (VTB)  3.375 g Intravenous Q8H Kai Pérez, DO   3.375 g at 09/02/24 0803    simethicone (MYLICON) 40 MG/0.6ML drops 40 mg  40 mg Oral 4x Daily PRN Juan Escalante DO   40 mg at 09/02/24 0803    sodium chloride 0.9 % flush 10 mL  10 mL Intravenous PRN Gil Lanza MD        sodium chloride 0.9 % flush 10 mL  10 mL Intravenous Q12H Juan Escalante DO   10 mL at 09/02/24 0804    sodium chloride 0.9 % flush  10 mL  10 mL Intravenous PRN Boris, Juan Issac, DO        sodium chloride 0.9 % infusion 40 mL  40 mL Intravenous PRN Boris, Juan Issac, DO        sodium chloride 0.9 % infusion  175 mL/hr Intravenous Continuous Boris, Juan Issac,  mL/hr at 09/02/24 0947 175 mL/hr at 09/02/24 0947     Lab Results (last 24 hours)       Procedure Component Value Units Date/Time    Urinalysis, Microscopic Only - Urine, Clean Catch [[id number]]  (Abnormal) Collected: 09/02/24 0948    Specimen: Urine, Clean Catch Updated: 09/02/24 1010     RBC, UA 0-2 /HPF      WBC, UA 3-5 /HPF      Comment: Urine culture not indicated.        Bacteria, UA 1+ /HPF      Squamous Epithelial Cells, UA 0-2 /HPF      Hyaline Casts, UA None Seen /LPF      Methodology Manual Light Microscopy    Urinalysis With Culture If Indicated - Urine, Clean Catch [[id number]]  (Abnormal) Collected: 09/02/24 0948    Specimen: Urine, Clean Catch Updated: 09/02/24 1001     Color, UA Buchanan     Appearance, UA Clear     pH, UA <=5.0     Specific Gravity, UA >=1.030     Glucose, UA Negative     Ketones, UA 80 mg/dL (3+)     Bilirubin, UA Moderate (2+)     Blood, UA Negative     Protein, UA 30 mg/dL (1+)     Leuk Esterase, UA Negative     Nitrite, UA Negative     Urobilinogen, UA 1.0 E.U./dL    Narrative:      In absence of clinical symptoms, the presence of pyuria, bacteria, and/or nitrites on the urinalysis result does not correlate with infection.    Comprehensive Metabolic Panel [[id number]]  (Abnormal) Collected: 09/02/24 0714    Specimen: Blood Updated: 09/02/24 0822     Glucose 75 mg/dL      BUN 12 mg/dL      Creatinine 1.17 mg/dL      Sodium 136 mmol/L      Potassium 3.7 mmol/L      Chloride 103 mmol/L      CO2 20.1 mmol/L      Calcium 8.4 mg/dL      Total Protein 6.4 g/dL      Albumin 3.4 g/dL      ALT (SGPT) 19 U/L      AST (SGOT) 14 U/L      Alkaline Phosphatase 61 U/L      Total Bilirubin 1.6 mg/dL      Globulin 3.0 gm/dL      A/G Ratio 1.1 g/dL       BUN/Creatinine Ratio 10.3     Anion Gap 12.9 mmol/L      eGFR 77.9 mL/min/1.73     Narrative:      GFR Normal >60  Chronic Kidney Disease <60  Kidney Failure <15      CBC (No Diff) [[id number]]  (Abnormal) Collected: 09/02/24 0714    Specimen: Blood Updated: 09/02/24 0805     WBC 12.68 10*3/mm3      RBC 3.92 10*6/mm3      Hemoglobin 12.6 g/dL      Hematocrit 37.0 %      MCV 94.4 fL      MCH 32.1 pg      MCHC 34.1 g/dL      RDW 12.9 %      RDW-SD 44.5 fl      MPV 9.7 fL      Platelets 139 10*3/mm3     Blood Culture - Blood, Arm, Left [[id number]]  (Normal) Collected: 08/31/24 1929    Specimen: Blood from Arm, Left Updated: 09/01/24 1945     Blood Culture No growth at 24 hours    Blood Culture - Blood, Hand, Left [[id number]]  (Normal) Collected: 08/31/24 1920    Specimen: Blood from Hand, Left Updated: 09/01/24 1945     Blood Culture No growth at 24 hours          Imaging Results (Last 24 Hours)       ** No results found for the last 24 hours. **          Orders (last 24 hrs)        Start     Ordered    09/02/24 1001  Urinalysis, Microscopic Only - Urine, Clean Catch  Once         09/02/24 1000    09/02/24 0600  CBC (No Diff)  Morning Draw         09/01/24 1445    09/02/24 0600  Comprehensive Metabolic Panel  Morning Draw         09/01/24 1445    09/02/24 0428  Urinalysis With Culture If Indicated - Urine, Clean Catch  Once         09/02/24 0430    09/02/24 0005  simethicone (MYLICON) 40 MG/0.6ML drops 40 mg  4 Times Daily PRN         09/02/24 0005    09/01/24 1600  cefTRIAXone (ROCEPHIN) 2,000 mg in sodium chloride 0.9 % 100 mL IVPB-VTB  Every 24 Hours,   Status:  Discontinued         09/01/24 1158    09/01/24 1600  metroNIDAZOLE (FLAGYL) IVPB 500 mg  Every 8 Hours,   Status:  Discontinued         09/01/24 1158    09/01/24 1600  piperacillin-tazobactam (ZOSYN) IVPB 3.375 g IVPB in 100 mL NS (VTB)  Every 8 Hours         09/01/24 1435    09/01/24 1432  Pharmacy to Dose Zosyn  Continuous PRN         09/01/24 1432     "09/01/24 1154  ketorolac (TORADOL) injection 30 mg  Every 6 Hours PRN         09/01/24 1154    09/01/24 0800  Oral Care  2 Times Daily       08/31/24 2102 09/01/24 0348  morphine injection 4 mg  Every 2 Hours PRN         09/01/24 0348    09/01/24 0000  Vital Signs  Every 4 Hours       08/31/24 2102 08/31/24 2200  sodium chloride 0.9 % flush 10 mL  Every 12 Hours Scheduled         08/31/24 2102 08/31/24 2200  sodium chloride 0.9 % infusion  Continuous         08/31/24 2102 08/31/24 2103  Intake & Output  Every Shift       08/31/24 2102 08/31/24 2102  sodium chloride 0.9 % flush 10 mL  As Needed         08/31/24 2102 08/31/24 2102  sodium chloride 0.9 % infusion 40 mL  As Needed         08/31/24 2102 08/31/24 2102  sennosides-docusate (PERICOLACE) 8.6-50 MG per tablet 2 tablet  2 Times Daily PRN        Placed in \"And\" Linked Group    08/31/24 2102 08/31/24 2102  polyethylene glycol (MIRALAX) packet 17 g  Daily PRN        Placed in \"And\" Linked Group    08/31/24 2102 08/31/24 2102  bisacodyl (DULCOLAX) EC tablet 5 mg  Daily PRN        Placed in \"And\" Linked Group    08/31/24 2102 08/31/24 2102  bisacodyl (DULCOLAX) suppository 10 mg  Daily PRN        Placed in \"And\" Linked Group    08/31/24 2102 08/31/24 2102  ondansetron (ZOFRAN) injection 4 mg  Every 6 Hours PRN         08/31/24 2102 08/31/24 2102  Morphine sulfate (PF) injection 2 mg  Every 4 Hours PRN        Placed in \"And\" Linked Group    08/31/24 2102 08/31/24 2102  naloxone (NARCAN) injection 0.4 mg  Every 5 Minutes PRN        Placed in \"And\" Linked Group    08/31/24 2102 08/31/24 1626  sodium chloride 0.9 % flush 10 mL  As Needed         08/31/24 1626                     Physician Progress Notes (most recent note)        Sandy Moran MD at 09/02/24 0836               LOS: 2 days   Patient Care Team:  Estefany Augustine APRN as PCP - General (Family Medicine)  Joshua Joel MD as Consulting Physician " (Gastroenterology)    Chief Complaint:  follow up diverticulitis    Subjective     Interval History:     No acute events reported by nursing overnight.  Remains afebrile with Tmax 99.9. No BM or flatus.  No nausea or vomiting.  Remains on ice chips and sips of water.  He rates his pain as a 5/10.  This is substantially improved compared to at the time of admission.    Review of Systems:   All systems were reviewed and negative except for:  Gastrointestinal: positive for  See HPI    Objective     Vital Signs  Temp:  [98.5 °F (36.9 °C)-99.9 °F (37.7 °C)] 98.5 °F (36.9 °C)  Heart Rate:  [] 88  Resp:  [18-20] 18  BP: (100-130)/(67-89) 107/70    Physical Exam:     General Appearance:    Alert, cooperative, in no acute distress   Head:    Normocephalic, without obvious abnormality, atraumatic   Lungs:     Respirations regular, even and unlabored    Heart:    Regular rhythm and normal rate   Abdomen:     Soft, ttp in the suprapubic region and right lower quadrant consistent with focal peritonitis, slightly less tender when compared to yesterday.  Mildly distended.   Extremities:   Moves all extremities well, no edema, no cyanosis, no  redness   Skin:   No bleeding, bruising or rash   Neurologic:   AAOx3, no gross deficits          Results Review:    I reviewed the patient's new clinical results.      Results from last 7 days   Lab Units 09/02/24  0714 09/01/24 0447 08/31/24  1700   SODIUM mmol/L 136 137 138   POTASSIUM mmol/L 3.7 3.9 3.9   CHLORIDE mmol/L 103 104 101   CO2 mmol/L 20.1* 21.7* 22.9   BUN mg/dL 12 11 10   CREATININE mg/dL 1.17 1.27 1.13   CALCIUM mg/dL 8.4* 9.2 9.9   BILIRUBIN mg/dL 1.6* 1.4* 0.6   ALK PHOS U/L 61 64 79   ALT (SGPT) U/L 19 26 34   AST (SGOT) U/L 14 20 27   GLUCOSE mg/dL 75 104* 102*       Results from last 7 days   Lab Units 09/02/24  0714 09/01/24 0447 08/31/24  1700   WBC 10*3/mm3 12.68* 13.40* 12.89*   HEMOGLOBIN g/dL 12.6* 13.8 15.0   HEMATOCRIT % 37.0* 39.9 43.0   PLATELETS  10*3/mm3 139* 169 195         Medication Review:   Scheduled Meds:piperacillin-tazobactam, 3.375 g, Intravenous, Q8H  sodium chloride, 10 mL, Intravenous, Q12H      Continuous Infusions:Pharmacy to Dose Zosyn,   sodium chloride, 175 mL/hr, Last Rate: 175 mL/hr (24 0441)      PRN Meds:.  senna-docusate sodium **AND** polyethylene glycol **AND** bisacodyl **AND** bisacodyl    ketorolac    Morphine    Morphine **AND** naloxone    ondansetron    Pharmacy to Dose Zosyn    simethicone    sodium chloride    sodium chloride    sodium chloride      Assessment & Plan       Abscess of sigmoid colon due to diverticulitis      Mr. Gamez is a 44-year-old gentleman admitted with what appears to be complicated diverticulitis with the development of a small pelvic abscess following colonoscopy at this institution on 2024.  He is being treated nonoperatively, with noted improvement in his overall condition.  I would recommend that he be continued on broad-spectrum antibiotic therapy with Zosyn, ice chips and sips of water.  He may have 1-2 popsicles per day.  Given that he has developed some mild abdominal distention, which is likely secondary to a low-grade ileus as a result of the pelvic inflammation, I would not recommend dietary advancement at this time.  I reassured the patient that he is improving with nonoperative management as expected, and would continue therapy as discussed in detail yesterday.  I am hopeful that he will be appropriate for discharge home in the next 24 to 48 hours.      Sandy Moran MD  24  08:36 EDT          Electronically signed by Sandy Moran MD at 24 1046          Consult Notes (most recent note)        Sandy Moran MD at 24 1148        Consult Orders    1. Inpatient General Surgery Consult [[id number]] ordered by Kai Pérez DO at 24 0898                 General Surgery Consult     Name:Will Gamez  Age: 44 y.o.  Gender: male  :  1980  MRN: [id number]  Visit Number: [id number]  Admit Date: 8/31/2024  Date of Service: 09/01/24    Patient Care Team:  Estefany Augustine APRN as PCP - General (Family Medicine)  Joshua Joel MD as Consulting Physician (Gastroenterology)    Reason for Consultation: Pelvic abscess status post colonoscopy on 8/29/2024    Chief complaint : Abdominal pain      History of Present Illness:     Will Gamez is a 44 y.o. male patient who presented to the emergency department overnight complaining of progressively worsening lower abdominal pain since undergoing a colonoscopy 2 days prior (8/29/2024) by Dr. Joel.  He reported that his pain had acutely worsened yesterday morning, and reported associated nausea, and chills.  He denied fever.  Workup in the emergency department revealed that the patient was afebrile and not tachycardic.  He was noted to be hypotensive with a blood pressure of 91/64.  There was no evidence of hypoxia.  He was noted to have tenderness on exam in the suprapubic region of the abdomen as well as the right lower quadrant. Laboratory evaluation revealed an elevated white blood cell count 12.9 with a hemoglobin of 15, hematocrit of 43, platelets of 195.  His comprehensive metabolic panel was completely normal with the exception of a glucose of 102.  A lipase level was normal.  Urinalysis was negative.  Blood cultures were obtained in the ER.  A lactate was slightly elevated at 2.1, but improved to 1.8 with hydration.  Given the patient's history, CT scan of the abdomen pelvis was performed demonstrating a 3.1 cm abscess within the pelvis contained adjacent to a pocket of gas within the mesentery, with associated severe mesenteric edema as well as edema of the adjacent sigmoid colon, and distal ileum.  There is noted to be edema of a sigmoid diverticulum adjacent to this region with several additional tiny pockets of mesenteric gas interposed between the abscess itself in the  "sigmoid colon.  It was noted that the distal appendiceal tip extended into the area of concern and was not well-visualized, but the proximal appendix was noted to be unremarkable.  Review of the patient's colonoscopy report from 8/29/2024 was undertaken, and revealed that the patient had a polypectomy of an 8 mm polyp in the ascending colon which was performed with a hot snare.  A 10 mm polyp was removed with a hot snare from the rectum.  Sigmoid diverticulosis without clear evidence of diverticulitis was documented by the endoscopist.  Additionally, note was made of patchy areas of erythematous mucosa of unclear clinical significance in the sigmoid colon thought to represent \"segmental colitis associated with diverticular disease.\"  Biopsies were obtained for histology.  Internal hemorrhoids were also seen.  I was contacted by the emergency department provider, and recommended that the patient be treated as a complicated diverticulitis, with bowel rest, hospital admission, broad-spectrum antibiotic coverage with Zosyn, and pain control.  The hospitalist graciously accepted the patient for admission and formally requested surgical consultation.    Overnight, I spoke to the hospitalist on-call at around 2 AM regarding the patient's pain control regimen.  The hospitalist was concerned that they were having difficulty controlling the patient's abdominal pain. At that time, the patient was noted to have orders for 2 mg of morphine IV every 4 hours as needed pain.  I recommended that the frequency of pain medication be increased to every 2 hours, and recommended either a higher dose of morphine, or a switch to hydromorphone.  I also recommended the use of ketorolac.  The patient had initially been placed on clear liquids per my recommendations, and I recommended that if he was having increasing pain that I would make him n.p.o. to assist with pain control as well.    Review of his medication administration overnight " demonstrates that his antibiotics were changed to ceftriaxone and Flagyl after initially receiving Zosyn x 3 doses.  It appears that he received a single dose of 30 mg of IV ketorolac as well as 7 doses of IV morphine.  2 of these were 2 mg doses, the remainder were 4 mg doses.  It appears that his last dose of pain medication was a 4 mg dose of morphine given at 8:50 AM today.     On my review of the patient's electronic medical record, it appears that he has had at least 2 prior CT scans this year demonstrating uncomplicated sigmoid diverticulitis.  The first of these was performed on 4/29/2024, and the second was performed on 7/23/2024.  It appears that he was treated on an outpatient basis with Levaquin and Flagyl for 7 days following the first episode, and was treated with Augmentin post metronidazole for 10 days after the second episode.    Patient Active Problem List   Diagnosis    LAW (obstructive sleep apnea)    Sigmoid diverticulitis    Abnormal finding on GI tract imaging    Diverticulosis of colon    Colon cancer screening    Abscess of sigmoid colon due to diverticulitis         History reviewed. No pertinent past medical history.    Past Surgical History:   Procedure Laterality Date    COLONOSCOPY N/A 8/29/2024    Procedure: COLONOSCOPY with biosy and polypectomy;  Surgeon: Joshua Joel MD;  Location: [facility] ENDOSCOPY;  Service: Gastroenterology;  Laterality: N/A;       Family History   Problem Relation Age of Onset    Hypertension Mother     Hypertension Brother        Social History     Socioeconomic History    Marital status:    Tobacco Use    Smoking status: Never    Smokeless tobacco: Former     Types: Chew   Vaping Use    Vaping status: Never Used   Substance and Sexual Activity    Alcohol use: Yes     Alcohol/week: 2.0 - 3.0 standard drinks of alcohol     Types: 2 - 3 Cans of beer per week     Comment: every other week    Drug use: No    Sexual activity: Defer         Current  Facility-Administered Medications:     sennosides-docusate (PERICOLACE) 8.6-50 MG per tablet 2 tablet, 2 tablet, Oral, BID PRN **AND** polyethylene glycol (MIRALAX) packet 17 g, 17 g, Oral, Daily PRN **AND** bisacodyl (DULCOLAX) EC tablet 5 mg, 5 mg, Oral, Daily PRN **AND** bisacodyl (DULCOLAX) suppository 10 mg, 10 mg, Rectal, Daily PRN, Boris, Juan Issac, DO    morphine injection 4 mg, 4 mg, Intravenous, Q2H PRN, Boris, Juan Issac, DO, 4 mg at 09/01/24 0850    Morphine sulfate (PF) injection 2 mg, 2 mg, Intravenous, Q4H PRN, 2 mg at 09/01/24 0150 **AND** naloxone (NARCAN) injection 0.4 mg, 0.4 mg, Intravenous, Q5 Min PRN, Boris, Juan Issac, DO    ondansetron (ZOFRAN) injection 4 mg, 4 mg, Intravenous, Q6H PRN, Boris, Juan Issac, DO    piperacillin-tazobactam (ZOSYN) IVPB 3.375 g IVPB in 100 mL NS (VTB), 3.375 g, Intravenous, Q6H, Boris, Juan Issac, DO, 3.375 g at 09/01/24 0951    sodium chloride 0.9 % flush 10 mL, 10 mL, Intravenous, PRN, Gil Lanza MD    sodium chloride 0.9 % flush 10 mL, 10 mL, Intravenous, Q12H, Boris, Juan Issac, DO, 10 mL at 08/31/24 2119    sodium chloride 0.9 % flush 10 mL, 10 mL, Intravenous, PRN, Boris, Juan Issac, DO    sodium chloride 0.9 % infusion 40 mL, 40 mL, Intravenous, PRN, Boris, Juan Issac, DO    sodium chloride 0.9 % infusion, 125 mL/hr, Intravenous, Continuous, Boris, Juan Issac, DO, Last Rate: 125 mL/hr at 09/01/24 0850, 125 mL/hr at 09/01/24 0850    Medications Prior to Admission   Medication Sig Dispense Refill Last Dose    cyclobenzaprine (FLEXERIL) 5 MG tablet Take 1 tablet by mouth At Night As Needed for Muscle Spasms. 20 tablet 1     nystatin (MYCOSTATIN) 100,000 unit/mL suspension Swish and swallow 5 mL 4 (Four) Times a Day. (Patient not taking: Reported on 8/28/2024) 60 mL 0 8/20/2024       No Known Allergies    Review of Systems   Constitutional:  Negative for chills, fever and unexpected weight change.   HENT:  Negative for trouble swallowing and voice  change.    Eyes:  Negative for visual disturbance.   Respiratory:  Negative for apnea, cough, chest tightness, shortness of breath and wheezing.    Cardiovascular:  Negative for chest pain, palpitations and leg swelling.   Gastrointestinal:  Positive for abdominal pain and nausea. Negative for abdominal distention, anal bleeding, blood in stool, constipation, diarrhea, rectal pain and vomiting.   Endocrine: Negative for cold intolerance and heat intolerance.   Genitourinary:  Negative for difficulty urinating, dysuria, flank pain, scrotal swelling and testicular pain.   Musculoskeletal:  Negative for back pain, gait problem and joint swelling.   Skin:  Negative for color change, rash and wound.   Neurological:  Negative for dizziness, syncope, speech difficulty, weakness, numbness and headaches.   Hematological:  Negative for adenopathy. Does not bruise/bleed easily.   Psychiatric/Behavioral:  Negative for confusion. The patient is not nervous/anxious.        OBJECTIVE:     Vital Signs  Temp:  [97.8 °F (36.6 °C)-99.9 °F (37.7 °C)] 98.6 °F (37 °C)  Heart Rate:  [60-95] 70  Resp:  [18-24] 18  BP: ()/(59-90) 100/67    No intake/output data recorded.  I/O last 3 completed shifts:  In: 100 [IV Piggyback:100]  Out: 300 [Urine:300]      Physical Exam:      General Appearance:    Alert, cooperative, in no acute distress   Head:    Normocephalic, without obvious abnormality, atraumatic   Eyes:            Lids and lashes normal, conjunctivae and sclerae normal, no icterus   Ears:    Ears appear intact with no abnormalities noted   Lungs:     Respirations regular, even and unlabored    Heart:    Regular rhythm and normal rate   Abdomen:     Soft, exquisitely ttp in the suprapubic region and right lower quadrant consistent with focal peritonitis.  No evidence of diffuse peritonitis.   Extremities:   Moves all extremities well, no edema, no cyanosis, no  redness   Skin:   No bleeding, bruising or rash   Neurologic:    AAOx3, no gross deficits         Results Review:  I have reviewed the entirety of the patient's clinical lab results.  I have also personally reviewed the patient's imaging    Narrative & Impression   FINAL REPORT     TECHNIQUE:  Routine axial images through the abdomen and pelvis were  obtained following IV contrast administration.     CLINICAL HISTORY:  Flank pain, kidney stone suspected llq pain x 1 week hx of  diverticulosis     FINDINGS:  Abdomen: The gallbladder is normal.  The solid abdominal organs  and ureters are unremarkable.  There is sigmoid diverticulosis  with significant inflammation adjacent to the proximal sigmoid  colon consistent with acute uncomplicated sigmoid  diverticulitis.  The remainder of the GI tract is unremarkable,  including the appendix.  Pelvis: The urinary bladder is  unremarkable.   There is no pelvic or abdominal ascites,  adenopathy or acute osseous abnormality.     IMPRESSION:  Acute uncomplicated sigmoid diverticulitis.     Authenticated and Electronically Signed by Corky Eldridge M.D. on  04/29/2024 07:21:25 PM       Narrative & Impression   CT ABDOMEN PELVIS W CONTRAST     Date of Exam: 7/23/2024 12:44 PM EDT     Indication: hx diverticulitis, with worsening abdominal pain..     Comparison: 4/29/2024     Technique: Axial CT images were obtained of the abdomen and pelvis following the uneventful intravenous administration of 98 cc Isovue-300. Reconstructed coronal and sagittal images were also obtained. Automated exposure control and iterative   construction methods were used.        Findings:  LUNG BASES:  Unremarkable without mass or infiltrate.     LIVER:  Unremarkable parenchyma without focal lesion.  BILIARY/GALLBLADDER:  Unremarkable  SPLEEN:  Unremarkable  PANCREAS:  Unremarkable  ADRENAL:  Unremarkable  KIDNEYS:  Unremarkable parenchyma with no solid mass identified. No obstruction.  No calculus identified.  GASTROINTESTINAL/MESENTERY: There are inflammatory changes  involving the mid sigmoid colon centered on a diverticulum, consistent with acute diverticulitis. There are surrounding inflammatory changes. No perforation nor abscess formation noted. Findings   are slightly more distal within the sigmoid colon compared to most recent exam. No evidence of obstruction nor additional site of inflammation. The appendix is normal.  MESENTERIC VESSELS:  Patent.  AORTA/IVC:  Normal caliber.     RETROPERITONEUM/LYMPH NODES:  Unremarkable     REPRODUCTIVE:  Unremarkable  BLADDER:  Unremarkable     OSSEUS STRUCTURES:  Typical for age with no acute process identified.        IMPRESSION:  Impression:  1.Acute mid sigmoid uncomplicated diverticulitis.           Electronically Signed: Pipo Cole MD    7/23/2024 1:21 PM EDT    Workstation ID: [id number]         Narrative & Impression   FINAL REPORT     TECHNIQUE:  null     CLINICAL HISTORY:  Lower abd, RLQ abd pain, eval diverticulitis, colitis, kidney  stone, appendici     COMPARISON:  null     FINDINGS:  CT abdomen and pelvis with contrast     Comparison: None     Findings:     Mild dependent changes at the lung bases.     Unremarkable gallbladder and solid organs. No urolithiasis.     3.1 x 1.8 x 1.2 cm abscess containing fluid and a pocket of gas within the mesentery of the right hemipelvis. There is severe edema of the adjacent mesentery. There is edema of adjacent portions of the sigmoid colon and distal ileum. There is edema of a   sigmoidal diverticulum and several additional tiny pockets of mesenteric gas are present interposed between the abscess and the sigmoid colon. The proximal appendix is unremarkable, but the distal appendix extends into the area of edema and does not well   visualized.     There is an umbilical hernia containing fat.     Unremarkable prostate gland and urinary bladder. Trace pelvic free fluid.     No acute fracture.     IMPRESSION:  IMPRESSION:     3.1 cm abscess and several tiny pockets of mesenteric gas  within the mesentery of the right hemipelvis. This is most likely on the basis of perforated diverticulitis of the sigmoid colon, but appendix tip perforation is not completely excluded. There is   secondary edema of an adjacent portion of the distal ileum.     Authenticated and Electronically Signed by Laura Gilliland MD on  08/31/2024 07:10:08 PM           Lab Results (last 72 hours)       Procedure Component Value Units Date/Time    Comprehensive Metabolic Panel [[id number]]  (Abnormal) Collected: 09/01/24 0447    Specimen: Blood Updated: 09/01/24 0543     Glucose 104 mg/dL      BUN 11 mg/dL      Creatinine 1.27 mg/dL      Sodium 137 mmol/L      Potassium 3.9 mmol/L      Chloride 104 mmol/L      CO2 21.7 mmol/L      Calcium 9.2 mg/dL      Total Protein 6.6 g/dL      Albumin 3.7 g/dL      ALT (SGPT) 26 U/L      AST (SGOT) 20 U/L      Alkaline Phosphatase 64 U/L      Total Bilirubin 1.4 mg/dL      Globulin 2.9 gm/dL      A/G Ratio 1.3 g/dL      BUN/Creatinine Ratio 8.7     Anion Gap 11.3 mmol/L      eGFR 70.6 mL/min/1.73     Narrative:      GFR Normal >60  Chronic Kidney Disease <60  Kidney Failure <15      Lactate Dehydrogenase [[id number]]  (Abnormal) Collected: 09/01/24 0447    Specimen: Blood Updated: 09/01/24 0543      U/L     Lactic Acid, Plasma [[id number]]  (Normal) Collected: 09/01/24 0447    Specimen: Blood Updated: 09/01/24 0535     Lactate 0.8 mmol/L     CBC (No Diff) [[id number]]  (Abnormal) Collected: 09/01/24 0447    Specimen: Blood Updated: 09/01/24 0457     WBC 13.40 10*3/mm3      RBC 4.33 10*6/mm3      Hemoglobin 13.8 g/dL      Hematocrit 39.9 %      MCV 92.1 fL      MCH 31.9 pg      MCHC 34.6 g/dL      RDW 12.6 %      RDW-SD 42.8 fl      MPV 9.2 fL      Platelets 169 10*3/mm3     STAT Lactic Acid, Reflex [[id number]]  (Normal) Collected: 08/31/24 2217    Specimen: Blood Updated: 08/31/24 2234     Lactate 1.8 mmol/L     Lactic Acid, Plasma [[id number]]  (Abnormal) Collected: 08/31/24 1929     Specimen: Blood Updated: 08/31/24 2009     Lactate 2.1 mmol/L     Blood Culture - Blood, Arm, Left [[id number]] Collected: 08/31/24 1929    Specimen: Blood from Arm, Left Updated: 08/31/24 1933    Blood Culture - Blood, Hand, Left [[id number]] Collected: 08/31/24 1920    Specimen: Blood from Hand, Left Updated: 08/31/24 1933    Urinalysis With Microscopic If Indicated (No Culture) - Urine, Clean Catch [[id number]]  (Abnormal) Collected: 08/31/24 1803    Specimen: Urine, Clean Catch Updated: 08/31/24 1808     Color, UA Yellow     Appearance, UA Clear     pH, UA >=9.0     Specific Gravity, UA 1.014     Glucose, UA Negative     Ketones, UA Negative     Bilirubin, UA Negative     Blood, UA Negative     Protein, UA Negative     Leuk Esterase, UA Negative     Nitrite, UA Negative     Urobilinogen, UA 0.2 E.U./dL    Narrative:      Urine microscopic not indicated.    Lipase [[id number]]  (Normal) Collected: 08/31/24 1700    Specimen: Blood Updated: 08/31/24 1732     Lipase 19 U/L     Comprehensive Metabolic Panel [[id number]]  (Abnormal) Collected: 08/31/24 1700    Specimen: Blood Updated: 08/31/24 1732     Glucose 102 mg/dL      BUN 10 mg/dL      Creatinine 1.13 mg/dL      Sodium 138 mmol/L      Potassium 3.9 mmol/L      Chloride 101 mmol/L      CO2 22.9 mmol/L      Calcium 9.9 mg/dL      Total Protein 7.5 g/dL      Albumin 4.3 g/dL      ALT (SGPT) 34 U/L      AST (SGOT) 27 U/L      Alkaline Phosphatase 79 U/L      Total Bilirubin 0.6 mg/dL      Globulin 3.2 gm/dL      A/G Ratio 1.3 g/dL      BUN/Creatinine Ratio 8.8     Anion Gap 14.1 mmol/L      eGFR 81.2 mL/min/1.73     Narrative:      GFR Normal >60  Chronic Kidney Disease <60  Kidney Failure <15      [place] [facility] [[id number]] Collected: 08/31/24 1700    Specimen: Blood Updated: 08/31/24 1716    Narrative:      The following orders were created for panel order [place] [facility].  Procedure                               Abnormality         Status                     ---------                                -----------         ------                     Green Top (Gel)[[id number]]                                  Final result               Lavender Top[[id number]]                                     Final result               Gold Top - SST[[id number]]                                   Final result               Light Blue Top[[id number]]                                   Final result                 Please view results for these tests on the individual orders.    Green Top (Gel) [[id number]] Collected: 08/31/24 1700    Specimen: Blood Updated: 08/31/24 1716     Extra Tube Hold for add-ons.     Comment: Auto resulted.       Lavender Top [[id number]] Collected: 08/31/24 1700    Specimen: Blood Updated: 08/31/24 1716     Extra Tube hold for add-on     Comment: Auto resulted       Gold Top - SST [[id number]] Collected: 08/31/24 1700    Specimen: Blood Updated: 08/31/24 1716     Extra Tube Hold for add-ons.     Comment: Auto resulted.       Light Blue Top [[id number]] Collected: 08/31/24 1700    Specimen: Blood Updated: 08/31/24 1716     Extra Tube Hold for add-ons.     Comment: Auto resulted       CBC & Differential [[id number]]  (Abnormal) Collected: 08/31/24 1700    Specimen: Blood Updated: 08/31/24 1710    Narrative:      The following orders were created for panel order CBC & Differential.  Procedure                               Abnormality         Status                     ---------                               -----------         ------                     CBC Auto Differential[[id number]]        Abnormal            Final result                 Please view results for these tests on the individual orders.    CBC Auto Differential [[id number]]  (Abnormal) Collected: 08/31/24 1700    Specimen: Blood Updated: 08/31/24 1710     WBC 12.89 10*3/mm3      RBC 4.74 10*6/mm3      Hemoglobin 15.0 g/dL      Hematocrit 43.0 %      MCV 90.7 fL      MCH 31.6 pg      MCHC 34.9 g/dL      RDW 12.4 %      RDW-SD 40.7  fl      MPV 9.4 fL      Platelets 195 10*3/mm3      Neutrophil % 74.8 %      Lymphocyte % 16.2 %      Monocyte % 6.4 %      Eosinophil % 1.8 %      Basophil % 0.4 %      Immature Grans % 0.4 %      Neutrophils, Absolute 9.65 10*3/mm3      Lymphocytes, Absolute 2.09 10*3/mm3      Monocytes, Absolute 0.82 10*3/mm3      Eosinophils, Absolute 0.23 10*3/mm3      Basophils, Absolute 0.05 10*3/mm3      Immature Grans, Absolute 0.05 10*3/mm3      nRBC 0.0 /100 WBC                             ASSESSMENT/PLAN:      Abscess of sigmoid colon due to diverticulitis    Mr. Gamez is a 44-year-old gentleman admitted overnight with evidence of what appears to be complicated diverticulitis with development of a small pelvic abscess following colonoscopy at this institution on 8/29/2024.  Based on my personal review of the patient's available records, including his emergency department records, recent colonoscopy report, and CT imaging, I suspect that he likely had a smoldering diverticulitis at the time of colonoscopy.  This would certainly explain his lower abdominal pain which preceded the colonoscopy evaluation, as well as the described areas of patchy, erythematous mucosa in the sigmoid colon which were felt to be of uncertain etiology, and biopsied.  I suspect he developed either a colonic microperforation, or perforation of the diverticulum during the colonoscopy leading to an acute exacerbation of diverticulitis, which is complicated based on the presence of a small pelvic abscess.  Fortunately, this should improve with standard therapy for complicated diverticulitis, which is nonoperative in nature.  Specifically, the patient should be continued on bowel rest.  He may have sips water and ice chips as tolerated, but I would not further advance his diet until his pain is improved.  Additionally, I would recommend continued broad-spectrum antibiotic coverage, as well as pain control, and antiemetics as needed.      Although I  generally prefer single agent therapy with piperacillin/tazobactam for complicated diverticulitis, a cephalosporin plus Flagyl regimen is an acceptable alternative. If a cephalosporin is preferred for therapy, I would recommend changing to cefepime based on our local susceptibilities, which are between 91 and 100% for Enterobacter (97), E. Coli (97), Klebsiella (98), Proteus (100), and Pseudomonas (91).  The numbers are similar for piperacillin/tazobactam with the exception of Enterobacter for which only 73% of isolates demonstrated sensitivity to Zosyn.  The patient's current regimen which includes ceftriaxone, provides no coverage for Pseudomonas, and only 83% of E. coli isolates were susceptible, and only 70% of Enterobacter isolates were susceptible to ceftriaxone.  It is worth noting for antibiotic selection purposes but the patient was treated with Levaquin/Flagyl as well as Augmentin/Flagyl over the last 5 months for 2 prior episodes of diverticulitis.      I had a long discussion with the patient and his wife regarding therapy for diverticulitis.  They had a number of questions which were answered to their satisfaction.  At this time, I do not recommend, or anticipate any acute surgical intervention for Mr. Gamez for his diverticulitis.  I expect that he will be hospitalized for 2 to 3 days before being appropriate to transition to oral antibiotics for discharge home.      Sandy Moran MD  09/01/24  11:48 EDT          Electronically signed by Sandy Moran MD at 09/01/24 9818

## 2024-09-02 NOTE — PROGRESS NOTES
[facility]    Name:  Will Gamez   Age:  44 y.o.  Sex:  male  :  1980  MRN:  [id number]   Visit Number:  [id number]  Admission Date:  2024  Date Of Service:  24  Primary Care Physician:  Estefany Augustine APRN     LOS: 2 days :    Chief Complaint:      Abdominal pain    Subjective:    24: Improving right lower abdominal pain. D/w Dr. Moran. No BM. Complains of flatulence.      Hospital Course:      Patient is a 44-year-old male with no significant past medical history who presented today for worsening abdominal pain.  Patient shared that over the last few months he has been suffering with mild intermittent right lower quadrant and suprapubic abdominal pain.  To evaluate his chronic pain, Dr. Joel performed colonoscopy on 2024 where diverticulosis was noted and 2 polyps were removed.  Patient was doing well until this morning where he felt progressive abdominal pain and lack of appetite.  He had a normal bowel movement in the morning.  He had no appetite through the day and stated he only had 4 bites of chicken.      Upon evaluation in the emergency room, patient had mild elevation in WBC and CT abdomen presented 3 cm abdominal abscess with areas of mesenteric gas manage right hemipelvis.  Dr. Moran was consulted and advised on admission and n.p.o. after midnight for possible procedure in AM.    Patient seen by surgery Dr. Moran no plan for surgery as yet.  Treating conservatively with IV antibiotics patient is improving somewhat.  Edited by: Kai Pérez DO at 2024 4101     Review of Systems:     All systems were reviewed and negative except as mentioned in subjective, assessment and plan.    Vital Signs:    Temp:  [98.5 °F (36.9 °C)-99.9 °F (37.7 °C)] 99.5 °F (37.5 °C)  Heart Rate:  [] 89  Resp:  [18-20] 18  BP: (103-130)/(68-89) 110/75    Intake and output:    I/O last 3 completed shifts:  In: 1700  "[I.V.:1500; IV Piggyback:200]  Out: 1350 [Urine:1350]  No intake/output data recorded.    Physical Examination:    Constitutional: No acute distress, awake, alert  HENT: NCAT, mucous membranes moist  Respiratory: Clear to auscultation bilaterally, respiratory effort normal   Cardiovascular: RRR, no murmurs, rubs, or gallops  Gastrointestinal: Positive bowel sounds, soft, severe lower abdominal and RLQ tenderness. Nondistended.  Musculoskeletal: No bilateral ankle edema  Psychiatric: Appropriate affect, cooperative  Neurologic: Oriented x 3, speech clear  Skin: No rashes  Exam stable 9/2/24    Laboratory results:    Results from last 7 days   Lab Units 09/02/24  0714 09/01/24 0447 08/31/24  1700   SODIUM mmol/L 136 137 138   POTASSIUM mmol/L 3.7 3.9 3.9   CHLORIDE mmol/L 103 104 101   CO2 mmol/L 20.1* 21.7* 22.9   BUN mg/dL 12 11 10   CREATININE mg/dL 1.17 1.27 1.13   CALCIUM mg/dL 8.4* 9.2 9.9   BILIRUBIN mg/dL 1.6* 1.4* 0.6   ALK PHOS U/L 61 64 79   ALT (SGPT) U/L 19 26 34   AST (SGOT) U/L 14 20 27   GLUCOSE mg/dL 75 104* 102*     Results from last 7 days   Lab Units 09/02/24  0714 09/01/24 0447 08/31/24  1700   WBC 10*3/mm3 12.68* 13.40* 12.89*   HEMOGLOBIN g/dL 12.6* 13.8 15.0   HEMATOCRIT % 37.0* 39.9 43.0   PLATELETS 10*3/mm3 139* 169 195             Results from last 7 days   Lab Units 08/31/24  1929 08/31/24  1920   BLOODCX  No growth at 24 hours No growth at 24 hours     No results for input(s): \"PHART\", \"VJL0BSS\", \"PO2ART\", \"ADH3UON\", \"BASEEXCESS\" in the last 8760 hours.   I have reviewed the patient's laboratory results.    Radiology results:    CT Abdomen Pelvis With Contrast    Addendum Date: 8/31/2024    ADDENDUM REPORT ADDENDUM: This report was discussed with Cait Winn RN on Aug 31, 2024 19:15:00 EDT. Authenticated and Electronically Signed by Laura Gilliland MD on 08/31/2024 07:15:32 PM    Result Date: 8/31/2024  FINAL REPORT TECHNIQUE: null CLINICAL HISTORY: Lower abd, RLQ abd pain, eval " diverticulitis, colitis, kidney stone, appendici COMPARISON: null FINDINGS: CT abdomen and pelvis with contrast Comparison: None Findings: Mild dependent changes at the lung bases. Unremarkable gallbladder and solid organs. No urolithiasis. 3.1 x 1.8 x 1.2 cm abscess containing fluid and a pocket of gas within the mesentery of the right hemipelvis. There is severe edema of the adjacent mesentery. There is edema of adjacent portions of the sigmoid colon and distal ileum. There is edema of a sigmoidal diverticulum and several additional tiny pockets of mesenteric gas are present interposed between the abscess and the sigmoid colon. The proximal appendix is unremarkable, but the distal appendix extends into the area of edema and does not well  visualized. There is an umbilical hernia containing fat. Unremarkable prostate gland and urinary bladder. Trace pelvic free fluid. No acute fracture.     Impression: IMPRESSION: 3.1 cm abscess and several tiny pockets of mesenteric gas within the mesentery of the right hemipelvis. This is most likely on the basis of perforated diverticulitis of the sigmoid colon, but appendix tip perforation is not completely excluded. There is secondary edema of an adjacent portion of the distal ileum. Authenticated and Electronically Signed by Laura Gilliland MD on 08/31/2024 07:10:08 PM   I have reviewed the patient's radiology reports.    Medication Review:     I have reviewed the patient's active and prn medications.     Problem List:      Abscess of sigmoid colon due to diverticulitis      Assessment/Plan:    Sigmoid colon diverticulitis and abscess  Mild ileus  - 3.1 x 1.8 x 1.2 cm abscess as well as right hemipelvic mesenteric gas on CT abdomen pelvis  - Patient had recent colonoscopy on 8/29/2024  - Dr. Moran following.    -Add dextrose to IV fluids.  -IV morphine/Toradol for pain control  -IV Zofran for nausea  - zosyn      Code status Full  Diet: NPO, plus ice chips and sips  Discharge  Plan: anticipate home in 2 to 3 days        Edited by: Kai Pérez DO at 9/2/2024 1449           Kai Pérez DO  09/02/24  14:50 EDT    Dictated utilizing Dragon dictation.

## 2024-09-02 NOTE — PROGRESS NOTES
LOS: 2 days   Patient Care Team:  Estefany Augustine APRN as PCP - General (Family Medicine)  Joshua Joel MD as Consulting Physician (Gastroenterology)    Chief Complaint:  follow up diverticulitis    Subjective     Interval History:     No acute events reported by nursing overnight.  Remains afebrile with Tmax 99.9. No BM or flatus.  No nausea or vomiting.  Remains on ice chips and sips of water.  He rates his pain as a 5/10.  This is substantially improved compared to at the time of admission.    Review of Systems:   All systems were reviewed and negative except for:  Gastrointestinal: positive for  See HPI    Objective     Vital Signs  Temp:  [98.5 °F (36.9 °C)-99.9 °F (37.7 °C)] 98.5 °F (36.9 °C)  Heart Rate:  [] 88  Resp:  [18-20] 18  BP: (100-130)/(67-89) 107/70    Physical Exam:     General Appearance:    Alert, cooperative, in no acute distress   Head:    Normocephalic, without obvious abnormality, atraumatic   Lungs:     Respirations regular, even and unlabored    Heart:    Regular rhythm and normal rate   Abdomen:     Soft, ttp in the suprapubic region and right lower quadrant consistent with focal peritonitis, slightly less tender when compared to yesterday.  Mildly distended.   Extremities:   Moves all extremities well, no edema, no cyanosis, no  redness   Skin:   No bleeding, bruising or rash   Neurologic:   AAOx3, no gross deficits          Results Review:    I reviewed the patient's new clinical results.      Results from last 7 days   Lab Units 09/02/24  0714 09/01/24  0447 08/31/24  1700   SODIUM mmol/L 136 137 138   POTASSIUM mmol/L 3.7 3.9 3.9   CHLORIDE mmol/L 103 104 101   CO2 mmol/L 20.1* 21.7* 22.9   BUN mg/dL 12 11 10   CREATININE mg/dL 1.17 1.27 1.13   CALCIUM mg/dL 8.4* 9.2 9.9   BILIRUBIN mg/dL 1.6* 1.4* 0.6   ALK PHOS U/L 61 64 79   ALT (SGPT) U/L 19 26 34   AST (SGOT) U/L 14 20 27   GLUCOSE mg/dL 75 104* 102*       Results from last 7 days   Lab Units 09/02/24  5293  09/01/24 0447 08/31/24  1700   WBC 10*3/mm3 12.68* 13.40* 12.89*   HEMOGLOBIN g/dL 12.6* 13.8 15.0   HEMATOCRIT % 37.0* 39.9 43.0   PLATELETS 10*3/mm3 139* 169 195         Medication Review:   Scheduled Meds:piperacillin-tazobactam, 3.375 g, Intravenous, Q8H  sodium chloride, 10 mL, Intravenous, Q12H      Continuous Infusions:Pharmacy to Dose Zosyn,   sodium chloride, 175 mL/hr, Last Rate: 175 mL/hr (09/02/24 0441)      PRN Meds:.  senna-docusate sodium **AND** polyethylene glycol **AND** bisacodyl **AND** bisacodyl    ketorolac    Morphine    Morphine **AND** naloxone    ondansetron    Pharmacy to Dose Zosyn    simethicone    sodium chloride    sodium chloride    sodium chloride      Assessment & Plan       Abscess of sigmoid colon due to diverticulitis      Mr. Gamez is a 44-year-old gentleman admitted with what appears to be complicated diverticulitis with the development of a small pelvic abscess following colonoscopy at this institution on 8/24/2024.  He is being treated nonoperatively, with noted improvement in his overall condition.  I would recommend that he be continued on broad-spectrum antibiotic therapy with Zosyn, ice chips and sips of water.  He may have 1-2 popsicles per day.  Given that he has developed some mild abdominal distention, which is likely secondary to a low-grade ileus as a result of the pelvic inflammation, I would not recommend dietary advancement at this time.  I reassured the patient that he is improving with nonoperative management as expected, and would continue therapy as discussed in detail yesterday.  I am hopeful that he will be appropriate for discharge home in the next 24 to 48 hours.      Sandy Moran MD  09/02/24  08:36 EDT

## 2024-09-03 LAB
ALBUMIN SERPL-MCNC: 3.1 G/DL (ref 3.5–5.2)
ALBUMIN/GLOB SERPL: 1 G/DL
ALP SERPL-CCNC: 69 U/L (ref 39–117)
ALT SERPL W P-5'-P-CCNC: 17 U/L (ref 1–41)
ANION GAP SERPL CALCULATED.3IONS-SCNC: 10.4 MMOL/L (ref 5–15)
AST SERPL-CCNC: 24 U/L (ref 1–40)
BASOPHILS # BLD AUTO: 0.02 10*3/MM3 (ref 0–0.2)
BASOPHILS NFR BLD AUTO: 0.2 % (ref 0–1.5)
BILIRUB SERPL-MCNC: 1.6 MG/DL (ref 0–1.2)
BUN SERPL-MCNC: 9 MG/DL (ref 6–20)
BUN/CREAT SERPL: 8 (ref 7–25)
CALCIUM SPEC-SCNC: 8.9 MG/DL (ref 8.6–10.5)
CHLORIDE SERPL-SCNC: 105 MMOL/L (ref 98–107)
CO2 SERPL-SCNC: 22.6 MMOL/L (ref 22–29)
CREAT SERPL-MCNC: 1.12 MG/DL (ref 0.76–1.27)
DEPRECATED RDW RBC AUTO: 43.6 FL (ref 37–54)
EGFRCR SERPLBLD CKD-EPI 2021: 82 ML/MIN/1.73
EOSINOPHIL # BLD AUTO: 0.29 10*3/MM3 (ref 0–0.4)
EOSINOPHIL NFR BLD AUTO: 3.2 % (ref 0.3–6.2)
ERYTHROCYTE [DISTWIDTH] IN BLOOD BY AUTOMATED COUNT: 12.7 % (ref 12.3–15.4)
GLOBULIN UR ELPH-MCNC: 3 GM/DL
GLUCOSE SERPL-MCNC: 89 MG/DL (ref 65–99)
HCT VFR BLD AUTO: 33.5 % (ref 37.5–51)
HGB BLD-MCNC: 11.2 G/DL (ref 13–17.7)
IMM GRANULOCYTES # BLD AUTO: 0.06 10*3/MM3 (ref 0–0.05)
IMM GRANULOCYTES NFR BLD AUTO: 0.7 % (ref 0–0.5)
LYMPHOCYTES # BLD AUTO: 1.06 10*3/MM3 (ref 0.7–3.1)
LYMPHOCYTES NFR BLD AUTO: 11.8 % (ref 19.6–45.3)
MCH RBC QN AUTO: 31.4 PG (ref 26.6–33)
MCHC RBC AUTO-ENTMCNC: 33.4 G/DL (ref 31.5–35.7)
MCV RBC AUTO: 93.8 FL (ref 79–97)
MONOCYTES # BLD AUTO: 0.78 10*3/MM3 (ref 0.1–0.9)
MONOCYTES NFR BLD AUTO: 8.7 % (ref 5–12)
NEUTROPHILS NFR BLD AUTO: 6.75 10*3/MM3 (ref 1.7–7)
NEUTROPHILS NFR BLD AUTO: 75.4 % (ref 42.7–76)
NRBC BLD AUTO-RTO: 0 /100 WBC (ref 0–0.2)
PLATELET # BLD AUTO: 125 10*3/MM3 (ref 140–450)
PMV BLD AUTO: 9.3 FL (ref 6–12)
POTASSIUM SERPL-SCNC: 4 MMOL/L (ref 3.5–5.2)
PROT SERPL-MCNC: 6.1 G/DL (ref 6–8.5)
RBC # BLD AUTO: 3.57 10*6/MM3 (ref 4.14–5.8)
SODIUM SERPL-SCNC: 138 MMOL/L (ref 136–145)
WBC NRBC COR # BLD AUTO: 8.96 10*3/MM3 (ref 3.4–10.8)

## 2024-09-03 PROCEDURE — 25810000003 DEXTROSE 5 % AND SODIUM CHLORIDE 0.9 % 5-0.9 % SOLUTION: Performed by: STUDENT IN AN ORGANIZED HEALTH CARE EDUCATION/TRAINING PROGRAM

## 2024-09-03 PROCEDURE — 25810000003 DEXTROSE 5% IN LACTATED RINGERS PER 1000 ML: Performed by: INTERNAL MEDICINE

## 2024-09-03 PROCEDURE — 85025 COMPLETE CBC W/AUTO DIFF WBC: CPT | Performed by: INTERNAL MEDICINE

## 2024-09-03 PROCEDURE — 99232 SBSQ HOSP IP/OBS MODERATE 35: CPT | Performed by: FAMILY MEDICINE

## 2024-09-03 PROCEDURE — 25010000002 MORPHINE PER 10 MG: Performed by: INTERNAL MEDICINE

## 2024-09-03 PROCEDURE — 99232 SBSQ HOSP IP/OBS MODERATE 35: CPT | Performed by: SURGERY

## 2024-09-03 PROCEDURE — 25010000002 PIPERACILLIN SOD-TAZOBACTAM PER 1 G: Performed by: INTERNAL MEDICINE

## 2024-09-03 PROCEDURE — 80053 COMPREHEN METABOLIC PANEL: CPT | Performed by: INTERNAL MEDICINE

## 2024-09-03 PROCEDURE — 25010000002 KETOROLAC TROMETHAMINE PER 15 MG: Performed by: INTERNAL MEDICINE

## 2024-09-03 RX ORDER — HYDROCODONE BITARTRATE AND ACETAMINOPHEN 7.5; 325 MG/1; MG/1
1 TABLET ORAL EVERY 4 HOURS PRN
Status: DISCONTINUED | OUTPATIENT
Start: 2024-09-03 | End: 2024-09-05 | Stop reason: HOSPADM

## 2024-09-03 RX ORDER — DEXTROSE MONOHYDRATE AND SODIUM CHLORIDE 5; .9 G/100ML; G/100ML
100 INJECTION, SOLUTION INTRAVENOUS CONTINUOUS
Status: DISCONTINUED | OUTPATIENT
Start: 2024-09-03 | End: 2024-09-05

## 2024-09-03 RX ADMIN — PIPERACILLIN SODIUM AND TAZOBACTAM SODIUM 3.38 G: 3; .375 INJECTION, POWDER, LYOPHILIZED, FOR SOLUTION INTRAVENOUS at 21:19

## 2024-09-03 RX ADMIN — HYDROCODONE BITARTRATE AND ACETAMINOPHEN 1 TABLET: 7.5; 325 TABLET ORAL at 17:22

## 2024-09-03 RX ADMIN — DEXTROSE AND SODIUM CHLORIDE 100 ML/HR: 5; 900 INJECTION, SOLUTION INTRAVENOUS at 21:19

## 2024-09-03 RX ADMIN — MORPHINE SULFATE 4 MG: 4 INJECTION, SOLUTION INTRAMUSCULAR; INTRAVENOUS at 00:00

## 2024-09-03 RX ADMIN — SODIUM CHLORIDE, SODIUM LACTATE, POTASSIUM CHLORIDE, CALCIUM CHLORIDE AND DEXTROSE MONOHYDRATE 175 ML/HR: 5; 600; 310; 30; 20 INJECTION, SOLUTION INTRAVENOUS at 00:00

## 2024-09-03 RX ADMIN — MORPHINE SULFATE 4 MG: 4 INJECTION, SOLUTION INTRAMUSCULAR; INTRAVENOUS at 13:33

## 2024-09-03 RX ADMIN — PIPERACILLIN SODIUM AND TAZOBACTAM SODIUM 3.38 G: 3; .375 INJECTION, POWDER, LYOPHILIZED, FOR SOLUTION INTRAVENOUS at 16:48

## 2024-09-03 RX ADMIN — MORPHINE SULFATE 2 MG: 2 INJECTION, SOLUTION INTRAMUSCULAR; INTRAVENOUS at 08:17

## 2024-09-03 RX ADMIN — KETOROLAC TROMETHAMINE 30 MG: 30 INJECTION, SOLUTION INTRAMUSCULAR; INTRAVENOUS at 21:24

## 2024-09-03 RX ADMIN — Medication 10 ML: at 21:28

## 2024-09-03 RX ADMIN — PIPERACILLIN SODIUM AND TAZOBACTAM SODIUM 3.38 G: 3; .375 INJECTION, POWDER, LYOPHILIZED, FOR SOLUTION INTRAVENOUS at 03:55

## 2024-09-03 RX ADMIN — PIPERACILLIN SODIUM AND TAZOBACTAM SODIUM 3.38 G: 3; .375 INJECTION, POWDER, LYOPHILIZED, FOR SOLUTION INTRAVENOUS at 10:32

## 2024-09-03 RX ADMIN — MORPHINE SULFATE 4 MG: 4 INJECTION, SOLUTION INTRAMUSCULAR; INTRAVENOUS at 19:24

## 2024-09-03 RX ADMIN — MORPHINE SULFATE 4 MG: 4 INJECTION, SOLUTION INTRAMUSCULAR; INTRAVENOUS at 10:51

## 2024-09-03 RX ADMIN — Medication 10 ML: at 10:39

## 2024-09-03 RX ADMIN — SENNOSIDES AND DOCUSATE SODIUM 2 TABLET: 50; 8.6 TABLET ORAL at 16:51

## 2024-09-03 RX ADMIN — KETOROLAC TROMETHAMINE 30 MG: 30 INJECTION, SOLUTION INTRAMUSCULAR; INTRAVENOUS at 03:55

## 2024-09-03 NOTE — CASE MANAGEMENT/SOCIAL WORK
Case Management/Social Work    Patient Name:  Will Gamez  YOB: 1980  MRN: [id number]  Admit Date:  8/31/2024        08:29 EDT  Met with patient at bedside. He plans to discharge home with family at discharge. States no needs at this time. CM will continue to follow.      Electronically signed by:  Merritt Crawford RN  09/03/24 08:29 EDT

## 2024-09-03 NOTE — PROGRESS NOTES
LOS: 3 days   Patient Care Team:  Estefany Augustine APRN as PCP - General (Family Medicine)  Joshua Joel MD as Consulting Physician (Gastroenterology)    Chief Complaint: Follow-up complicated diverticulitis with small pelvic abscess    Subjective     Interval History:   No acute events reported overnight.  Pain continues to improve.  Patient reports he is passing flatus but has not had a bowel movement.  He feels less distended when compared to yesterday.  He tolerated limited clears yesterday without difficulty.  He has numerous family members at the bedside during my evaluation, who all report that he is markedly improved since the last time they had seen him on 9/1/2024.    Review of Systems:    All systems were reviewed and negative except for:  Gastrointestinal: positive for  See HPI    Objective     Vital Signs  Temp:  [97.3 °F (36.3 °C)-99.1 °F (37.3 °C)] 98.4 °F (36.9 °C)  Heart Rate:  [74-92] 86  Resp:  [16-20] 20  BP: (116-132)/(73-85) 132/85    Physical Exam:     General Appearance:    Alert, cooperative, in no acute distress   Head:    Normocephalic, without obvious abnormality, atraumatic   Lungs:     Respirations regular, even and unlabored    Heart:    Regular rhythm and normal rate   Abdomen:   Soft, nondistended, improving tenderness to palpation in the suprapubic region and right lower quadrant without peritonitis.   Genitalia:    Deferred   Extremities:   Moves all extremities well, no edema, no cyanosis, no  redness   Pulses:   Pulses palpable and equal bilaterally   Skin:   No bleeding, bruising or rash   Neurologic:   AAOx3, no gross deficits          Results Review:    I reviewed the patient's new clinical results.      Results from last 7 days   Lab Units 09/03/24  0750 09/02/24  0714 09/01/24  0447   SODIUM mmol/L 138 136 137   POTASSIUM mmol/L 4.0 3.7 3.9   CHLORIDE mmol/L 105 103 104   CO2 mmol/L 22.6 20.1* 21.7*   BUN mg/dL 9 12 11   CREATININE mg/dL 1.12 1.17 1.27   CALCIUM  mg/dL 8.9 8.4* 9.2   BILIRUBIN mg/dL 1.6* 1.6* 1.4*   ALK PHOS U/L 69 61 64   ALT (SGPT) U/L 17 19 26   AST (SGOT) U/L 24 14 20   GLUCOSE mg/dL 89 75 104*       Results from last 7 days   Lab Units 09/03/24  0750 09/02/24  0714 09/01/24  0447   WBC 10*3/mm3 8.96 12.68* 13.40*   HEMOGLOBIN g/dL 11.2* 12.6* 13.8   HEMATOCRIT % 33.5* 37.0* 39.9   PLATELETS 10*3/mm3 125* 139* 169         Medication Review:   Scheduled Meds:piperacillin-tazobactam, 3.375 g, Intravenous, Q6H  sodium chloride, 10 mL, Intravenous, Q12H      Continuous Infusions:dextrose 5 % and lactated Ringer's, 100 mL/hr, Last Rate: 100 mL/hr (09/03/24 1158)  Pharmacy to Dose Zosyn,       PRN Meds:.  senna-docusate sodium **AND** polyethylene glycol **AND** bisacodyl **AND** bisacodyl    HYDROcodone-acetaminophen    ketorolac    Morphine    [DISCONTINUED] Morphine **AND** naloxone    ondansetron    Pharmacy to Dose Zosyn    simethicone    sodium chloride    sodium chloride    sodium chloride      Assessment & Plan       Abscess of sigmoid colon due to diverticulitis    Mr. Gamez is a 44-year-old gentleman admitted with what appears to be complicated diverticulitis with the development of a small pelvic abscess following colonoscopy at this institution on 8/24/2024. He is being treated nonoperatively, with noted improvement in his overall condition. I would recommend that he be continued on broad-spectrum antibiotic therapy with Zosyn, and I have advanced his diet to clear liquids.  I have also added oral pain medication to his regimen, and discussed with him that this is likely to provide more prolonged pain control as compared to IV pain medication.  I encouraged him to be out of bed and ambulating frequently.  We discussed the phenomenon of a low-grade ileus related to the level of inflammation in the pelvis.  We discussed criteria for discharge home, and I am hopeful that he will be able to be discharged home tomorrow.    Sandy Moran,  MD  09/03/24  13:03 EDT

## 2024-09-03 NOTE — PROGRESS NOTES
"Dietitian Assessment    Patient Name: Will Gamez  YOB: 1980  MRN: [id number]  Admission date: 8/31/2024    Comment:      Clinical Nutrition Assessment      Reason for Assessment NPO x 3 days   H&P  History reviewed. No pertinent past medical history.    Past Surgical History:   Procedure Laterality Date    COLONOSCOPY N/A 8/29/2024    Procedure: COLONOSCOPY with biosy and polypectomy;  Surgeon: Joshua Joel MD;  Location: [facility] ENDOSCOPY;  Service: Gastroenterology;  Laterality: N/A;            Current Problems   Abscess of sigmoid colon due to diverticulitis       Encounter Information        Trending Narrative     9/3: Pt NPO x 3 days d/t sigmoid colon diverticulitis and abscess. Pt w/ obese BMI of 34.90 and MST score 1. Will follow-up and monitor diet status.      Anthropometrics        Current Height, Weight Height: 172.7 cm (68\")  Weight: 104 kg (229 lb 8 oz) (09/03/24 0500)   Trending Weight Hx     This admission:              PTA:     Wt Readings from Last 30 Encounters:   09/03/24 0500 104 kg (229 lb 8 oz)   08/31/24 2108 95.8 kg (211 lb 3.2 oz)   08/31/24 1617 93 kg (205 lb)   08/28/24 0901 90.7 kg (200 lb)   07/23/24 1036 102 kg (224 lb)   04/29/24 0749 102 kg (225 lb)   12/11/23 1449 103 kg (226 lb)   06/23/23 1307 103 kg (226 lb)   06/17/22 0827 98 kg (216 lb)   03/21/22 1609 98.9 kg (218 lb)   01/30/19 1405 97.1 kg (214 lb)      BMI kg/m2 Body mass index is 34.9 kg/m².     Labs        Pertinent Labs     Results from last 7 days   Lab Units 09/02/24  0714 09/01/24  0447 08/31/24  1700   SODIUM mmol/L 136 137 138   POTASSIUM mmol/L 3.7 3.9 3.9   CHLORIDE mmol/L 103 104 101   CO2 mmol/L 20.1* 21.7* 22.9   BUN mg/dL 12 11 10   CREATININE mg/dL 1.17 1.27 1.13   CALCIUM mg/dL 8.4* 9.2 9.9   BILIRUBIN mg/dL 1.6* 1.4* 0.6   ALK PHOS U/L 61 64 79   ALT (SGPT) U/L 19 26 34   AST (SGOT) U/L 14 20 27   GLUCOSE mg/dL 75 104* 102*       Results from last 7 days   Lab Units 09/03/24  3737 " "  HEMOGLOBIN g/dL 11.2*   HEMATOCRIT % 33.5*       No results found for: \"HGBA1C\"         Medications       Scheduled Medications piperacillin-tazobactam, 3.375 g, Intravenous, Q6H  sodium chloride, 10 mL, Intravenous, Q12H        Infusions dextrose 5 % and lactated Ringer's, 175 mL/hr, Last Rate: 175 mL/hr (09/03/24 0000)  Pharmacy to Dose Zosyn,          PRN Medications   senna-docusate sodium **AND** polyethylene glycol **AND** bisacodyl **AND** bisacodyl    ketorolac    Morphine    Morphine **AND** naloxone    ondansetron    Pharmacy to Dose Zosyn    simethicone    sodium chloride    sodium chloride    sodium chloride     Physical Findings        Trending Physical   Appearance, NFPE    --  Edema  None reported    Bowel Function None reported    Tubes Peripheral IV    Chewing/Swallowing NPO   Skin WNL      Estimated/Assessed Needs       Energy Requirements    EST Needs, Method, Wt used 2080-2288kcals/day using 20-22kcals/kg        Protein Requirements    EST Needs, Method, Wt used 83g protein per day using .8g/kg       Fluid Requirements     Estimated Needs (mL/day) 2080mL per day        Current Nutrition Orders & Evaluation of Intake       Oral Nutrition     Food Allergies    Current PO Diet NPO Diet NPO Type: Strict NPO   Supplement    PO Evaluation     Trending % PO Intake      Enteral Nutrition    Enteral Route    Order, Modulars, Flushes    Residual/Tolerance    TF Observation         Parenteral Nutrition     TPN Route    Total # Days on TPN    TPN Order, Lipid Details    MVI & Trace Element Freq    TPN Observation       Nutrition Diagnosis         Nutrition Dx Problem 1 Altered GI function r/t abscess of sigmoid colon due to diverticulitis as evidenced by NPO diet regimen.       Nutrition Dx Problem 2        Intervention Goal         Intervention Goal(s) Diet advancement when medically appropriate  Maintain current body weight      Nutrition Intervention        RD Action Will continue to follow-up and " monitor      Nutrition Prescription          Diet Prescription NPO    Supplement Prescription      Enteral Prescription        TPN Prescription      Monitor/Evaluation        Monitor Per protocol, I&O, Pertinent labs, Weight, Skin status, GI status, Symptoms, Hemodynamic stability     RD to follow-up.     Electronically signed by:  Kole Bobby RD  09/03/24 08:11 EDT

## 2024-09-03 NOTE — PLAN OF CARE
Goal Outcome Evaluation:     Patient complaining of abdominal pain and gas discomfort controlled with PRN medications. VSS. IV abx administered as ordered.

## 2024-09-03 NOTE — PROGRESS NOTES
[facility]    Name:  Will Gamez   Age:  44 y.o.  Sex:  male  :  1980  MRN:  [id number]   Visit Number:  [id number]  Admission Date:  2024  Date Of Service:  24  Primary Care Physician:  Estefany Augustine APRN     LOS: 3 days :    Chief Complaint:      Abdominal pain    Subjective:    Patient states feels a lot better than he did when he came in.  Still with pain, notes it feels like gas pain.  Has been ambulating through the halls.    Hospital Course:      Patient is a 44-year-old male with no significant past medical history who presented today for worsening abdominal pain.  Patient shared that over the last few months he has been suffering with mild intermittent right lower quadrant and suprapubic abdominal pain.  To evaluate his chronic pain, Dr. Joel performed colonoscopy on 2024 where diverticulosis was noted and 2 polyps were removed.  Patient was doing well until this morning where he felt progressive abdominal pain and lack of appetite.  He had a normal bowel movement in the morning.  He had no appetite through the day and stated he only had 4 bites of chicken.      Upon evaluation in the emergency room, patient had mild elevation in WBC and CT abdomen presented 3 cm abdominal abscess with areas of mesenteric gas manage right hemipelvis.  Dr. Moran was consulted and advised on admission and n.p.o. after midnight for possible procedure in AM.    Patient seen by surgery Dr. Moran no plan for surgery as yet.  Treating conservatively with IV antibiotics patient is improving somewhat.      Review of Systems:     All systems were reviewed and negative except as mentioned in subjective, assessment and plan.    Vital Signs:    Temp:  [97.3 °F (36.3 °C)-99.1 °F (37.3 °C)] 98.4 °F (36.9 °C)  Heart Rate:  [74-92] 86  Resp:  [16-20] 20  BP: (116-132)/(73-85) 132/85    Intake and output:    I/O last 3 completed shifts:  In: -   Out:  "1050 [Urine:1050]  I/O this shift:  In: 1145.1 [P.O.:240; IV Piggyback:905.1]  Out: -     Physical Examination:    Constitutional: No acute distress, awake, alert  HENT: NCAT, mucous membranes moist  Respiratory: Clear to auscultation bilaterally, respiratory effort normal   Cardiovascular: RRR, no murmurs, rubs, or gallops  Gastrointestinal: Mild to moderate lower tenderness.  Mild distention.  Musculoskeletal: No bilateral ankle edema  Psychiatric: Appropriate affect, cooperative  Neurologic: Oriented x 3, speech clear  Skin: No rashes      Laboratory results:    Results from last 7 days   Lab Units 09/03/24  0750 09/02/24  0714 09/01/24  0447   SODIUM mmol/L 138 136 137   POTASSIUM mmol/L 4.0 3.7 3.9   CHLORIDE mmol/L 105 103 104   CO2 mmol/L 22.6 20.1* 21.7*   BUN mg/dL 9 12 11   CREATININE mg/dL 1.12 1.17 1.27   CALCIUM mg/dL 8.9 8.4* 9.2   BILIRUBIN mg/dL 1.6* 1.6* 1.4*   ALK PHOS U/L 69 61 64   ALT (SGPT) U/L 17 19 26   AST (SGOT) U/L 24 14 20   GLUCOSE mg/dL 89 75 104*     Results from last 7 days   Lab Units 09/03/24  0750 09/02/24  0714 09/01/24 0447   WBC 10*3/mm3 8.96 12.68* 13.40*   HEMOGLOBIN g/dL 11.2* 12.6* 13.8   HEMATOCRIT % 33.5* 37.0* 39.9   PLATELETS 10*3/mm3 125* 139* 169             Results from last 7 days   Lab Units 08/31/24  1929 08/31/24  1920   BLOODCX  No growth at 2 days No growth at 2 days     No results for input(s): \"PHART\", \"RCN2OGD\", \"PO2ART\", \"RMZ4IJW\", \"BASEEXCESS\" in the last 8760 hours.   I have reviewed the patient's laboratory results.    Radiology results:    No radiology results from the last 24 hrs  I have reviewed the patient's radiology reports.    Medication Review:     I have reviewed the patient's active and prn medications.     Problem List:      Abscess of sigmoid colon due to diverticulitis      Assessment/Plan:    Sigmoid colon diverticulitis and abscess  Mild ileus  Patient remains on Zosyn.  General surgery following.  Will decrease IV fluids.  Encouraged " ambulation, oral pain medication today.    Code status: Full  Diet: Advance today clears per surgery  Discharge Plan: Hopefully home tomorrow with oral antibiotics    Jailyn Mcbride DO  09/03/24  14:01 EDT    Dictated utilizing Dragon dictation.

## 2024-09-03 NOTE — PLAN OF CARE
Goal Outcome Evaluation:  Plan of Care Reviewed With: patient        Progress: improving       VSS. Pt c/o pain during shift. See MAR.

## 2024-09-04 LAB
ALBUMIN SERPL-MCNC: 2.9 G/DL (ref 3.5–5.2)
ALBUMIN/GLOB SERPL: 0.9 G/DL
ALP SERPL-CCNC: 76 U/L (ref 39–117)
ALT SERPL W P-5'-P-CCNC: 20 U/L (ref 1–41)
ANION GAP SERPL CALCULATED.3IONS-SCNC: 8.7 MMOL/L (ref 5–15)
AST SERPL-CCNC: 20 U/L (ref 1–40)
BASOPHILS # BLD AUTO: 0.02 10*3/MM3 (ref 0–0.2)
BASOPHILS NFR BLD AUTO: 0.4 % (ref 0–1.5)
BILIRUB SERPL-MCNC: 1.8 MG/DL (ref 0–1.2)
BUN SERPL-MCNC: 6 MG/DL (ref 6–20)
BUN/CREAT SERPL: 6.1 (ref 7–25)
CALCIUM SPEC-SCNC: 8.6 MG/DL (ref 8.6–10.5)
CHLORIDE SERPL-SCNC: 106 MMOL/L (ref 98–107)
CO2 SERPL-SCNC: 23.3 MMOL/L (ref 22–29)
CREAT SERPL-MCNC: 0.98 MG/DL (ref 0.76–1.27)
DEPRECATED RDW RBC AUTO: 44.6 FL (ref 37–54)
EGFRCR SERPLBLD CKD-EPI 2021: 96.3 ML/MIN/1.73
EOSINOPHIL # BLD AUTO: 0.31 10*3/MM3 (ref 0–0.4)
EOSINOPHIL NFR BLD AUTO: 5.9 % (ref 0.3–6.2)
ERYTHROCYTE [DISTWIDTH] IN BLOOD BY AUTOMATED COUNT: 13 % (ref 12.3–15.4)
GLOBULIN UR ELPH-MCNC: 3.1 GM/DL
GLUCOSE SERPL-MCNC: 112 MG/DL (ref 65–99)
HCT VFR BLD AUTO: 31 % (ref 37.5–51)
HGB BLD-MCNC: 10.5 G/DL (ref 13–17.7)
IMM GRANULOCYTES # BLD AUTO: 0.01 10*3/MM3 (ref 0–0.05)
IMM GRANULOCYTES NFR BLD AUTO: 0.2 % (ref 0–0.5)
LYMPHOCYTES # BLD AUTO: 0.97 10*3/MM3 (ref 0.7–3.1)
LYMPHOCYTES NFR BLD AUTO: 18.6 % (ref 19.6–45.3)
MCH RBC QN AUTO: 31.5 PG (ref 26.6–33)
MCHC RBC AUTO-ENTMCNC: 33.9 G/DL (ref 31.5–35.7)
MCV RBC AUTO: 93.1 FL (ref 79–97)
MONOCYTES # BLD AUTO: 0.59 10*3/MM3 (ref 0.1–0.9)
MONOCYTES NFR BLD AUTO: 11.3 % (ref 5–12)
NEUTROPHILS NFR BLD AUTO: 3.32 10*3/MM3 (ref 1.7–7)
NEUTROPHILS NFR BLD AUTO: 63.6 % (ref 42.7–76)
NRBC BLD AUTO-RTO: 0 /100 WBC (ref 0–0.2)
PLATELET # BLD AUTO: 132 10*3/MM3 (ref 140–450)
PMV BLD AUTO: 9.7 FL (ref 6–12)
POTASSIUM SERPL-SCNC: 3.4 MMOL/L (ref 3.5–5.2)
PROT SERPL-MCNC: 6 G/DL (ref 6–8.5)
RBC # BLD AUTO: 3.33 10*6/MM3 (ref 4.14–5.8)
SODIUM SERPL-SCNC: 138 MMOL/L (ref 136–145)
WBC NRBC COR # BLD AUTO: 5.22 10*3/MM3 (ref 3.4–10.8)

## 2024-09-04 PROCEDURE — 25010000002 PIPERACILLIN SOD-TAZOBACTAM PER 1 G: Performed by: INTERNAL MEDICINE

## 2024-09-04 PROCEDURE — 25010000002 MORPHINE PER 10 MG: Performed by: FAMILY MEDICINE

## 2024-09-04 PROCEDURE — 99232 SBSQ HOSP IP/OBS MODERATE 35: CPT | Performed by: FAMILY MEDICINE

## 2024-09-04 PROCEDURE — 85025 COMPLETE CBC W/AUTO DIFF WBC: CPT | Performed by: FAMILY MEDICINE

## 2024-09-04 PROCEDURE — 80053 COMPREHEN METABOLIC PANEL: CPT | Performed by: FAMILY MEDICINE

## 2024-09-04 PROCEDURE — 25010000002 MORPHINE PER 10 MG: Performed by: INTERNAL MEDICINE

## 2024-09-04 PROCEDURE — 99232 SBSQ HOSP IP/OBS MODERATE 35: CPT | Performed by: SURGERY

## 2024-09-04 PROCEDURE — 25810000003 DEXTROSE 5 % AND SODIUM CHLORIDE 0.9 % 5-0.9 % SOLUTION: Performed by: STUDENT IN AN ORGANIZED HEALTH CARE EDUCATION/TRAINING PROGRAM

## 2024-09-04 RX ORDER — MORPHINE SULFATE 2 MG/ML
2 INJECTION, SOLUTION INTRAMUSCULAR; INTRAVENOUS
Status: DISCONTINUED | OUTPATIENT
Start: 2024-09-04 | End: 2024-09-05 | Stop reason: HOSPADM

## 2024-09-04 RX ORDER — POTASSIUM CHLORIDE 750 MG/1
40 CAPSULE, EXTENDED RELEASE ORAL ONCE
Status: COMPLETED | OUTPATIENT
Start: 2024-09-04 | End: 2024-09-04

## 2024-09-04 RX ADMIN — POTASSIUM CHLORIDE 40 MEQ: 750 CAPSULE, EXTENDED RELEASE ORAL at 14:54

## 2024-09-04 RX ADMIN — PIPERACILLIN SODIUM AND TAZOBACTAM SODIUM 3.38 G: 3; .375 INJECTION, POWDER, LYOPHILIZED, FOR SOLUTION INTRAVENOUS at 21:13

## 2024-09-04 RX ADMIN — Medication 10 ML: at 20:41

## 2024-09-04 RX ADMIN — SENNOSIDES AND DOCUSATE SODIUM 2 TABLET: 50; 8.6 TABLET ORAL at 15:02

## 2024-09-04 RX ADMIN — DEXTROSE AND SODIUM CHLORIDE 100 ML/HR: 5; 900 INJECTION, SOLUTION INTRAVENOUS at 16:52

## 2024-09-04 RX ADMIN — HYDROCODONE BITARTRATE AND ACETAMINOPHEN 1 TABLET: 7.5; 325 TABLET ORAL at 11:06

## 2024-09-04 RX ADMIN — HYDROCODONE BITARTRATE AND ACETAMINOPHEN 1 TABLET: 7.5; 325 TABLET ORAL at 07:01

## 2024-09-04 RX ADMIN — HYDROCODONE BITARTRATE AND ACETAMINOPHEN 1 TABLET: 7.5; 325 TABLET ORAL at 18:29

## 2024-09-04 RX ADMIN — Medication 10 ML: at 10:42

## 2024-09-04 RX ADMIN — PIPERACILLIN SODIUM AND TAZOBACTAM SODIUM 3.38 G: 3; .375 INJECTION, POWDER, LYOPHILIZED, FOR SOLUTION INTRAVENOUS at 16:53

## 2024-09-04 RX ADMIN — MORPHINE SULFATE 4 MG: 4 INJECTION, SOLUTION INTRAMUSCULAR; INTRAVENOUS at 02:36

## 2024-09-04 RX ADMIN — SODIUM CHLORIDE 40 ML: 9 INJECTION, SOLUTION INTRAVENOUS at 16:51

## 2024-09-04 RX ADMIN — PIPERACILLIN SODIUM AND TAZOBACTAM SODIUM 3.38 G: 3; .375 INJECTION, POWDER, LYOPHILIZED, FOR SOLUTION INTRAVENOUS at 04:17

## 2024-09-04 RX ADMIN — MORPHINE SULFATE 2 MG: 2 INJECTION, SOLUTION INTRAMUSCULAR; INTRAVENOUS at 15:01

## 2024-09-04 RX ADMIN — DEXTROSE AND SODIUM CHLORIDE 100 ML/HR: 5; 900 INJECTION, SOLUTION INTRAVENOUS at 06:59

## 2024-09-04 RX ADMIN — PIPERACILLIN SODIUM AND TAZOBACTAM SODIUM 3.38 G: 3; .375 INJECTION, POWDER, LYOPHILIZED, FOR SOLUTION INTRAVENOUS at 10:33

## 2024-09-04 NOTE — PROGRESS NOTES
"Dietitian Follow-up    Patient Name: Will Gamez  YOB: 1980  MRN: [id number]  Admission date: 8/31/2024    Comment:      Clinical Nutrition Follow-up   Encounter Information        Trending Narrative     9/4: Diet advanced to clear liquid - no PO reported at this time. Will order boost breeze daily.    9/3: Pt NPO x 3 days d/t sigmoid colon diverticulitis and abscess. Pt w/ obese BMI of 34.90 and MST score 1. Will follow-up and monitor diet status.      Anthropometrics        Current Height, Weight Height: 172.7 cm (68\")  Weight: 104 kg (229 lb 8 oz) (09/03/24 0500)       Trending Weight Hx     This admission:              PTA:     Wt Readings from Last 30 Encounters:   09/03/24 0500 104 kg (229 lb 8 oz)   08/31/24 2108 95.8 kg (211 lb 3.2 oz)   08/31/24 1617 93 kg (205 lb)   08/28/24 0901 90.7 kg (200 lb)   07/23/24 1036 102 kg (224 lb)   04/29/24 0749 102 kg (225 lb)   12/11/23 1449 103 kg (226 lb)   06/23/23 1307 103 kg (226 lb)   06/17/22 0827 98 kg (216 lb)   03/21/22 1609 98.9 kg (218 lb)   01/30/19 1405 97.1 kg (214 lb)      BMI kg/m2 Body mass index is 34.9 kg/m².     Labs        Pertinent Labs Results from last 7 days   Lab Units 09/04/24  0514 09/03/24  0750 09/02/24  0714   SODIUM mmol/L 138 138 136   POTASSIUM mmol/L 3.4* 4.0 3.7   CHLORIDE mmol/L 106 105 103   CO2 mmol/L 23.3 22.6 20.1*   BUN mg/dL 6 9 12   CREATININE mg/dL 0.98 1.12 1.17   CALCIUM mg/dL 8.6 8.9 8.4*   BILIRUBIN mg/dL 1.8* 1.6* 1.6*   ALK PHOS U/L 76 69 61   ALT (SGPT) U/L 20 17 19   AST (SGOT) U/L 20 24 14   GLUCOSE mg/dL 112* 89 75     Results from last 7 days   Lab Units 09/04/24  0514   HEMOGLOBIN g/dL 10.5*   HEMATOCRIT % 31.0*         Medications    Scheduled Medications piperacillin-tazobactam, 3.375 g, Intravenous, Q6H  sodium chloride, 10 mL, Intravenous, Q12H        Infusions dextrose 5 % and sodium chloride 0.9 %, 100 mL/hr, Last Rate: 100 mL/hr (09/04/24 0659)  Pharmacy to Dose Zosyn,         PRN " Medications   senna-docusate sodium **AND** polyethylene glycol **AND** bisacodyl **AND** bisacodyl    HYDROcodone-acetaminophen    ketorolac    Morphine    [DISCONTINUED] Morphine **AND** naloxone    ondansetron    Pharmacy to Dose Zosyn    simethicone    sodium chloride    sodium chloride    sodium chloride     Physical Findings        Trending Physical   Appearance, NFPE    --  Edema  None reported   Bowel Function None reported    Tubes Peripheral IV    Chewing/Swallowing WNL   Skin WNL    --  Current Nutrition Orders & Evaluation of Intake       Oral Nutrition     Food Allergies    Current PO Diet Diet: Liquid; Clear Liquid; Fluid Consistency: Thin (IDDSI 0)   Supplement    PO Evaluation     Trending % PO Intake 9/4: diet upgraded to clear liquid - no PO reported at this time      Nutrition Diagnosis         Nutrition Dx Problem 1 Altered GI function r/t abscess of sigmoid colon due to diverticulitis as evidenced by NPO diet regimen.       Nutrition Dx Problem 2        Intervention Goal         Intervention Goal(s) PO intake to meet >50% of estimated needs  Adhere to supplement ordered  Maintain current body weight      Nutrition Intervention        RD Action Will order Boost Breeze daily     Nutrition Prescription          Diet Prescription Clear liquid   Supplement Prescription Boost Breeze daily   Enteral Nutrition Prescription     TPN Prescription       Monitor/Evaluation        Monitor Per protocol, PO intake, Supplement intake, Pertinent labs, Weight, Skin status, GI status, Symptoms, Swallow function     RD available PRN.   Electronically signed by:  Kole Bobby RD  09/04/24 09:28 EDT

## 2024-09-04 NOTE — PROGRESS NOTES
[facility]    Name:  Will Gamez   Age:  44 y.o.  Sex:  male  :  1980  MRN:  [id number]   Visit Number:  [id number]  Admission Date:  2024  Date Of Service:  24  Primary Care Physician:  Estefany Augustine APRN     LOS: 4 days :    Chief Complaint:      Abdominal pain    Subjective:    Patient was standing in the room and had been walking.  He has been passing some small amounts of gas, still feels somewhat distended.  Did require some morphine overnight.  No fevers.  [facility]    Hospital Course:      Patient is a 44-year-old male with no significant past medical history who presented today for worsening abdominal pain.  Patient shared that over the last few months he has been suffering with mild intermittent right lower quadrant and suprapubic abdominal pain.  To evaluate his chronic pain, Dr. Joel performed colonoscopy on 2024 where diverticulosis was noted and 2 polyps were removed.  Patient was doing well until this morning where he felt progressive abdominal pain and lack of appetite.  He had a normal bowel movement in the morning.  He had no appetite through the day and stated he only had 4 bites of chicken.      Upon evaluation in the emergency room, patient had mild elevation in WBC and CT abdomen presented 3 cm abdominal abscess with areas of mesenteric gas right hemipelvis.  Dr. Moran was consulted and advised on admission and n.p.o. after midnight for possible procedure in AM.    Patient seen by surgery Dr. Moran no plan for surgery as yet.  Has been on IV Zosyn.  Appears to be slowly improving since admission.      Review of Systems:     All systems were reviewed and negative except as mentioned in subjective, assessment and plan.    Vital Signs:    Temp:  [97.8 °F (36.6 °C)-99.1 °F (37.3 °C)] 98.4 °F (36.9 °C)  Heart Rate:  [67-90] 68  Resp:  [16-18] 16  BP: (118-140)/(81-97) 140/84    Intake and output:    I/O last 3  "completed shifts:  In: 2695.1 [P.O.:840; I.V.:950; IV Piggyback:905.1]  Out: 900 [Urine:900]  I/O this shift:  In: 720 [P.O.:720]  Out: -     Physical Examination:    Constitutional: No acute distress, awake, alert  HENT: NCAT, mucous membranes moist  Respiratory: Clear to auscultation bilaterally, respiratory effort normal   Cardiovascular: RRR, no murmurs, rubs, or gallops  Gastrointestinal: Mild to moderate primary right-sided tenderness.  Mild distention.  Musculoskeletal: No bilateral ankle edema  Psychiatric: Appropriate affect, cooperative  Neurologic: Oriented x 3, speech clear  Skin: No rashes      Laboratory results:    Results from last 7 days   Lab Units 09/04/24  0514 09/03/24  0750 09/02/24  0714   SODIUM mmol/L 138 138 136   POTASSIUM mmol/L 3.4* 4.0 3.7   CHLORIDE mmol/L 106 105 103   CO2 mmol/L 23.3 22.6 20.1*   BUN mg/dL 6 9 12   CREATININE mg/dL 0.98 1.12 1.17   CALCIUM mg/dL 8.6 8.9 8.4*   BILIRUBIN mg/dL 1.8* 1.6* 1.6*   ALK PHOS U/L 76 69 61   ALT (SGPT) U/L 20 17 19   AST (SGOT) U/L 20 24 14   GLUCOSE mg/dL 112* 89 75     Results from last 7 days   Lab Units 09/04/24  0514 09/03/24  0750 09/02/24  0714   WBC 10*3/mm3 5.22 8.96 12.68*   HEMOGLOBIN g/dL 10.5* 11.2* 12.6*   HEMATOCRIT % 31.0* 33.5* 37.0*   PLATELETS 10*3/mm3 132* 125* 139*             Results from last 7 days   Lab Units 08/31/24  1929 08/31/24  1920   BLOODCX  No growth at 3 days No growth at 3 days     No results for input(s): \"PHART\", \"RIB3RZC\", \"PO2ART\", \"JPI0QXH\", \"BASEEXCESS\" in the last 8760 hours.   I have reviewed the patient's laboratory results.    Radiology results:    No radiology results from the last 24 hrs  I have reviewed the patient's radiology reports.    Medication Review:     I have reviewed the patient's active and prn medications.     Problem List:      Abscess of sigmoid colon due to diverticulitis      Assessment/Plan:    Sigmoid colon diverticulitis and abscess  Mild ileus  Will continue with Zosyn.  His " hand inflammatory markers are coming down he remains afebrile.  He was started on oral liquids and pain medication, still requiring some morphine.  I continue to encourage him to ambulate and use incentive spirometer.    Discussed will have surgery follow-up with patient, he may be able to go home tomorrow with oral antibiotics.    Code status: Full  Diet: Advance today clears per surgery  Discharge Plan: Hopefully home tomorrow with oral antibiotics    Jailyn Mcbride,   09/04/24  13:55 EDT    Dictated utilizing Dragon dictation.

## 2024-09-04 NOTE — PROGRESS NOTES
LOS: 4 days   Patient Care Team:  Estefany Augustine APRN as PCP - General (Family Medicine)  Joshua Joel MD as Consulting Physician (Gastroenterology)    Chief Complaint:  follow up diverticulitis    Subjective     Interval History:   No acute events reported overnight.  Passing flatus.  No BM.  Continues request IV morphine.  Last dose 1501 today.     Review of Systems:   All systems were reviewed and negative except for:  Gastrointestinal: positive for  See HPI    Objective     Vital Signs  Temp:  [97.8 °F (36.6 °C)-99.1 °F (37.3 °C)] 97.8 °F (36.6 °C)  Heart Rate:  [66-90] 66  Resp:  [16] 16  BP: (118-141)/(81-97) 141/90    Physical Exam:     General Appearance:    Alert, cooperative, in no acute distress   Head:    Normocephalic, without obvious abnormality, atraumatic   Lungs:     Respirations regular, even and unlabored    Heart:    Regular rhythm and normal rate   Abdomen:     Soft, mildly protuberant abdomen, minimal ttp in the  suprapubic region and RLQ, no guarding, no rebound   tenderness   Extremities:   Moves all extremities well, no edema, no cyanosis, no  redness   Pulses:   Pulses palpable and equal bilaterally   Skin:   No bleeding, bruising or rash   Neurologic:   AAOx3, no gross deficits          Results Review:    I reviewed the patient's new clinical results.    Results from last 7 days   Lab Units 09/04/24  0514 09/03/24  0750 09/02/24  0714   SODIUM mmol/L 138 138 136   POTASSIUM mmol/L 3.4* 4.0 3.7   CHLORIDE mmol/L 106 105 103   CO2 mmol/L 23.3 22.6 20.1*   BUN mg/dL 6 9 12   CREATININE mg/dL 0.98 1.12 1.17   CALCIUM mg/dL 8.6 8.9 8.4*   BILIRUBIN mg/dL 1.8* 1.6* 1.6*   ALK PHOS U/L 76 69 61   ALT (SGPT) U/L 20 17 19   AST (SGOT) U/L 20 24 14   GLUCOSE mg/dL 112* 89 75       Results from last 7 days   Lab Units 09/04/24  0514 09/03/24  0750 09/02/24  0714   WBC 10*3/mm3 5.22 8.96 12.68*   HEMOGLOBIN g/dL 10.5* 11.2* 12.6*   HEMATOCRIT % 31.0* 33.5* 37.0*   PLATELETS 10*3/mm3 132*  125* 139*         Medication Review:   Scheduled Meds:piperacillin-tazobactam, 3.375 g, Intravenous, Q6H  sodium chloride, 10 mL, Intravenous, Q12H      Continuous Infusions:dextrose 5 % and sodium chloride 0.9 %, 100 mL/hr, Last Rate: 100 mL/hr (09/04/24 0659)  Pharmacy to Dose Zosyn,       PRN Meds:.  senna-docusate sodium **AND** polyethylene glycol **AND** bisacodyl **AND** bisacodyl    HYDROcodone-acetaminophen    Morphine    [DISCONTINUED] Morphine **AND** naloxone    ondansetron    Pharmacy to Dose Zosyn    simethicone    sodium chloride    sodium chloride    sodium chloride    Assessment & Plan       Abscess of sigmoid colon due to diverticulitis      Mr. Gamez is a 44-year-old gentleman admitted with what appears to be complicated diverticulitis with the development of a small pelvic abscess following colonoscopy at this institution on 8/24/2024. He is being treated nonoperatively, with noted improvement in his overall condition. I would recommend that he be continued on broad-spectrum antibiotic therapy with Zosyn, with a transition to Augmentin for discharge. I recommend that he complete 10-14 days of antibiotic therapy. His WBC remains normal and he is tolerating a liquid diet.  From a surgical perspective, he may be discharged home once his pain can be adequately controlled on oral agents alone.  He will need to follow up in my office in 2 weeks.      Sandy Moran MD  09/04/24  16:10 EDT

## 2024-09-04 NOTE — CASE MANAGEMENT/SOCIAL WORK
Continued Stay Note   [facility]     Patient Name: Will Gamez  MRN: [id number]  Today's Date: 9/4/2024    Admit Date: 8/31/2024    Plan: spoke with pt in room this am; stated no cm needs   Discharge Plan       Row Name 09/04/24 1300       Plan    Plan spoke with pt in room this am; stated no cm needs                   Discharge Codes    No documentation.                 Expected Discharge Date and Time       Expected Discharge Date Expected Discharge Time    Sep 4, 2024               Darlin Ledesma RN

## 2024-09-04 NOTE — PLAN OF CARE
Goal Outcome Evaluation:  Plan of Care Reviewed With: patient        Progress: improving       Pt c/o pain. See MAR.

## 2024-09-04 NOTE — PLAN OF CARE
Problem: Adult Inpatient Plan of Care  Goal: Plan of Care Review  Outcome: Ongoing, Progressing  Goal: Patient-Specific Goal (Individualized)  Outcome: Ongoing, Progressing  Goal: Absence of Hospital-Acquired Illness or Injury  Outcome: Ongoing, Progressing  Intervention: Identify and Manage Fall Risk  Recent Flowsheet Documentation  Taken 9/4/2024 0200 by Kristen Abrams RN  Safety Promotion/Fall Prevention:   activity supervised   assistive device/personal items within reach   clutter free environment maintained   safety round/check completed  Taken 9/4/2024 0000 by Kristen Abrams RN  Safety Promotion/Fall Prevention:   activity supervised   assistive device/personal items within reach   clutter free environment maintained   safety round/check completed  Taken 9/3/2024 2130 by Kristen Abrams RN  Safety Promotion/Fall Prevention:   activity supervised   assistive device/personal items within reach   clutter free environment maintained   safety round/check completed  Taken 9/3/2024 2000 by Kristen Abrams RN  Safety Promotion/Fall Prevention:   activity supervised   assistive device/personal items within reach   clutter free environment maintained   safety round/check completed  Intervention: Prevent Skin Injury  Recent Flowsheet Documentation  Taken 9/4/2024 0200 by Kristen Abrams RN  Body Position:   position changed independently   supine, legs elevated  Taken 9/4/2024 0000 by Kristen Abrams RN  Body Position:   position changed independently   tilted   right  Taken 9/3/2024 2130 by Kristen Abrams RN  Body Position:   position changed independently   sitting up in bed  Taken 9/3/2024 2000 by Kristen Abrams RN  Body Position:   position changed independently   sitting up in bed  Intervention: Prevent and Manage VTE (Venous Thromboembolism) Risk  Recent Flowsheet Documentation  Taken 9/4/2024 0200 by Kristen Abrams RN  Activity Management: activity minimized  Taken 9/4/2024 0000  by Kristen Abrams RN  Activity Management: activity minimized  Taken 9/3/2024 2130 by Kristen Abrams RN  Activity Management: activity encouraged  Taken 9/3/2024 2000 by Kristen Abrams RN  Activity Management: activity encouraged  Intervention: Prevent Infection  Recent Flowsheet Documentation  Taken 9/4/2024 0200 by Kristen Abrams RN  Infection Prevention:   environmental surveillance performed   rest/sleep promoted  Taken 9/4/2024 0000 by Kristen Abrams RN  Infection Prevention:   environmental surveillance performed   rest/sleep promoted  Taken 9/3/2024 2130 by Kristen Abrams RN  Infection Prevention: environmental surveillance performed  Taken 9/3/2024 2000 by Kristen Abrams RN  Infection Prevention: environmental surveillance performed  Goal: Optimal Comfort and Wellbeing  Outcome: Ongoing, Progressing  Intervention: Monitor Pain and Promote Comfort  Recent Flowsheet Documentation  Taken 9/3/2024 2124 by Kristen Abrams RN  Pain Management Interventions: see MAR  Taken 9/3/2024 1924 by Kristen Abrams RN  Pain Management Interventions: see MAR  Intervention: Provide Person-Centered Care  Recent Flowsheet Documentation  Taken 9/3/2024 2130 by Kristen Abrams RN  Trust Relationship/Rapport:   care explained   choices provided   emotional support provided  Goal: Readiness for Transition of Care  Outcome: Ongoing, Progressing     Problem: Infection  Goal: Absence of Infection Signs and Symptoms  Outcome: Ongoing, Progressing     Problem: Fluid Deficit (Intestinal Obstruction)  Goal: Fluid Balance  Outcome: Ongoing, Progressing     Problem: Infection (Intestinal Obstruction)  Goal: Absence of Infection Signs and Symptoms  Outcome: Ongoing, Progressing     Problem: Nausea and Vomiting (Intestinal Obstruction)  Goal: Nausea and Vomiting Relief  Outcome: Ongoing, Progressing     Problem: Pain (Intestinal Obstruction)  Goal: Acceptable Pain Control  Outcome: Ongoing,  Progressing  Intervention: Monitor and Manage Pain  Recent Flowsheet Documentation  Taken 9/3/2024 2124 by Kristen Abrams, RN  Pain Management Interventions: see MAR  Taken 9/3/2024 1924 by Kristen Abrams, RN  Pain Management Interventions: see MAR   Goal Outcome Evaluation:         Plan of care reviewed with patient. Patient has rested between care. Patient still required IV pain management this shift. No significant events this shift.

## 2024-09-05 ENCOUNTER — READMISSION MANAGEMENT (OUTPATIENT)
Dept: CALL CENTER | Facility: HOSPITAL | Age: 44
End: 2024-09-05
Payer: COMMERCIAL

## 2024-09-05 ENCOUNTER — TELEPHONE (OUTPATIENT)
Dept: INTERNAL MEDICINE | Facility: CLINIC | Age: 44
End: 2024-09-05
Payer: COMMERCIAL

## 2024-09-05 VITALS
HEART RATE: 80 BPM | SYSTOLIC BLOOD PRESSURE: 141 MMHG | BODY MASS INDEX: 34.78 KG/M2 | TEMPERATURE: 99.6 F | OXYGEN SATURATION: 94 % | HEIGHT: 68 IN | DIASTOLIC BLOOD PRESSURE: 85 MMHG | RESPIRATION RATE: 18 BRPM | WEIGHT: 229.5 LBS

## 2024-09-05 LAB
ALBUMIN SERPL-MCNC: 2.9 G/DL (ref 3.5–5.2)
ALBUMIN/GLOB SERPL: 0.9 G/DL
ALP SERPL-CCNC: 102 U/L (ref 39–117)
ALT SERPL W P-5'-P-CCNC: 30 U/L (ref 1–41)
ANION GAP SERPL CALCULATED.3IONS-SCNC: 10.4 MMOL/L (ref 5–15)
AST SERPL-CCNC: 25 U/L (ref 1–40)
BACTERIA SPEC AEROBE CULT: NORMAL
BACTERIA SPEC AEROBE CULT: NORMAL
BILIRUB SERPL-MCNC: 1.6 MG/DL (ref 0–1.2)
BUN SERPL-MCNC: 2 MG/DL (ref 6–20)
BUN/CREAT SERPL: 2.2 (ref 7–25)
CALCIUM SPEC-SCNC: 8.6 MG/DL (ref 8.6–10.5)
CHLORIDE SERPL-SCNC: 104 MMOL/L (ref 98–107)
CO2 SERPL-SCNC: 21.6 MMOL/L (ref 22–29)
CREAT SERPL-MCNC: 0.9 MG/DL (ref 0.76–1.27)
DEPRECATED RDW RBC AUTO: 43 FL (ref 37–54)
EGFRCR SERPLBLD CKD-EPI 2021: 106.7 ML/MIN/1.73
ERYTHROCYTE [DISTWIDTH] IN BLOOD BY AUTOMATED COUNT: 13 % (ref 12.3–15.4)
GLOBULIN UR ELPH-MCNC: 3.3 GM/DL
GLUCOSE SERPL-MCNC: 105 MG/DL (ref 65–99)
HCT VFR BLD AUTO: 33.9 % (ref 37.5–51)
HGB BLD-MCNC: 11.7 G/DL (ref 13–17.7)
MCH RBC QN AUTO: 31.3 PG (ref 26.6–33)
MCHC RBC AUTO-ENTMCNC: 34.5 G/DL (ref 31.5–35.7)
MCV RBC AUTO: 90.6 FL (ref 79–97)
PLATELET # BLD AUTO: 171 10*3/MM3 (ref 140–450)
PMV BLD AUTO: 9.4 FL (ref 6–12)
POTASSIUM SERPL-SCNC: 3.3 MMOL/L (ref 3.5–5.2)
PROT SERPL-MCNC: 6.2 G/DL (ref 6–8.5)
RBC # BLD AUTO: 3.74 10*6/MM3 (ref 4.14–5.8)
SODIUM SERPL-SCNC: 136 MMOL/L (ref 136–145)
WBC NRBC COR # BLD AUTO: 7.39 10*3/MM3 (ref 3.4–10.8)

## 2024-09-05 PROCEDURE — 99238 HOSP IP/OBS DSCHRG MGMT 30/<: CPT | Performed by: FAMILY MEDICINE

## 2024-09-05 PROCEDURE — 25010000002 PIPERACILLIN SOD-TAZOBACTAM PER 1 G: Performed by: INTERNAL MEDICINE

## 2024-09-05 PROCEDURE — 85027 COMPLETE CBC AUTOMATED: CPT | Performed by: FAMILY MEDICINE

## 2024-09-05 PROCEDURE — 25810000003 DEXTROSE 5 % AND SODIUM CHLORIDE 0.9 % 5-0.9 % SOLUTION: Performed by: STUDENT IN AN ORGANIZED HEALTH CARE EDUCATION/TRAINING PROGRAM

## 2024-09-05 PROCEDURE — 80053 COMPREHEN METABOLIC PANEL: CPT | Performed by: FAMILY MEDICINE

## 2024-09-05 RX ORDER — HYDROCODONE BITARTRATE AND ACETAMINOPHEN 7.5; 325 MG/1; MG/1
1 TABLET ORAL EVERY 6 HOURS PRN
Qty: 28 TABLET | Refills: 0 | Status: SHIPPED | OUTPATIENT
Start: 2024-09-05 | End: 2024-09-12

## 2024-09-05 RX ORDER — ONDANSETRON 4 MG/1
4 TABLET, FILM COATED ORAL EVERY 8 HOURS PRN
Qty: 9 TABLET | Refills: 0 | Status: SHIPPED | OUTPATIENT
Start: 2024-09-05 | End: 2024-09-08

## 2024-09-05 RX ADMIN — PIPERACILLIN SODIUM AND TAZOBACTAM SODIUM 3.38 G: 3; .375 INJECTION, POWDER, LYOPHILIZED, FOR SOLUTION INTRAVENOUS at 09:27

## 2024-09-05 RX ADMIN — HYDROCODONE BITARTRATE AND ACETAMINOPHEN 1 TABLET: 7.5; 325 TABLET ORAL at 00:38

## 2024-09-05 RX ADMIN — HYDROCODONE BITARTRATE AND ACETAMINOPHEN 1 TABLET: 7.5; 325 TABLET ORAL at 09:27

## 2024-09-05 RX ADMIN — DEXTROSE AND SODIUM CHLORIDE 100 ML/HR: 5; 900 INJECTION, SOLUTION INTRAVENOUS at 03:41

## 2024-09-05 RX ADMIN — PIPERACILLIN SODIUM AND TAZOBACTAM SODIUM 3.38 G: 3; .375 INJECTION, POWDER, LYOPHILIZED, FOR SOLUTION INTRAVENOUS at 03:40

## 2024-09-05 NOTE — PLAN OF CARE
Problem: Adult Inpatient Plan of Care  Goal: Plan of Care Review  Outcome: Ongoing, Progressing  Goal: Patient-Specific Goal (Individualized)  Outcome: Ongoing, Progressing  Goal: Absence of Hospital-Acquired Illness or Injury  Outcome: Ongoing, Progressing  Intervention: Identify and Manage Fall Risk  Recent Flowsheet Documentation  Taken 9/5/2024 0000 by Kristen Abrams RN  Safety Promotion/Fall Prevention:   activity supervised   assistive device/personal items within reach   clutter free environment maintained   safety round/check completed  Taken 9/4/2024 2200 by Kristen Abrams RN  Safety Promotion/Fall Prevention:   activity supervised   assistive device/personal items within reach   clutter free environment maintained   safety round/check completed  Taken 9/4/2024 2045 by Kristen Abrams RN  Safety Promotion/Fall Prevention:   activity supervised   assistive device/personal items within reach   clutter free environment maintained   safety round/check completed  Intervention: Prevent Skin Injury  Recent Flowsheet Documentation  Taken 9/5/2024 0000 by Kristen Abrams RN  Body Position:   position changed independently   side-lying   right  Taken 9/4/2024 2200 by Kristen Abrams RN  Body Position:   position changed independently   tilted   right  Taken 9/4/2024 2045 by Kristen Abrams RN  Body Position:   position changed independently   sitting up in bed  Intervention: Prevent and Manage VTE (Venous Thromboembolism) Risk  Recent Flowsheet Documentation  Taken 9/5/2024 0000 by Kristen Abrams RN  Activity Management: activity minimized  Taken 9/4/2024 2200 by Kristen Abrams RN  Activity Management: activity encouraged  Taken 9/4/2024 2045 by Kristen Abrams RN  Activity Management: activity encouraged  Intervention: Prevent Infection  Recent Flowsheet Documentation  Taken 9/5/2024 0000 by Kristen Abrams RN  Infection Prevention:   environmental surveillance performed    rest/sleep promoted  Taken 9/4/2024 2200 by Kristen Abrams RN  Infection Prevention: environmental surveillance performed  Taken 9/4/2024 2045 by Kristen Abrams RN  Infection Prevention: environmental surveillance performed  Goal: Optimal Comfort and Wellbeing  Outcome: Ongoing, Progressing  Intervention: Monitor Pain and Promote Comfort  Recent Flowsheet Documentation  Taken 9/4/2024 2045 by Kristen Abrams RN  Pain Management Interventions: quiet environment facilitated  Intervention: Provide Person-Centered Care  Recent Flowsheet Documentation  Taken 9/4/2024 2045 by Kristen Abrams RN  Trust Relationship/Rapport:   care explained   choices provided   emotional support provided  Goal: Readiness for Transition of Care  Outcome: Ongoing, Progressing     Problem: Infection  Goal: Absence of Infection Signs and Symptoms  Outcome: Ongoing, Progressing     Problem: Fluid Deficit (Intestinal Obstruction)  Goal: Fluid Balance  Outcome: Ongoing, Progressing     Problem: Infection (Intestinal Obstruction)  Goal: Absence of Infection Signs and Symptoms  Outcome: Ongoing, Progressing     Problem: Nausea and Vomiting (Intestinal Obstruction)  Goal: Nausea and Vomiting Relief  Outcome: Ongoing, Progressing     Problem: Pain (Intestinal Obstruction)  Goal: Acceptable Pain Control  Outcome: Ongoing, Progressing  Intervention: Monitor and Manage Pain  Recent Flowsheet Documentation  Taken 9/4/2024 2045 by Kristen Abrams RN  Pain Management Interventions: quiet environment facilitated   Goal Outcome Evaluation:        Plan of care reviewed with patient. Patient has rested intermittently tonight. No significant changes this shift. Patient is concerned about borderline temp. IV AX infused as ordered. Plan for possible discharge home today.

## 2024-09-05 NOTE — DISCHARGE SUMMARY
[facility]   DISCHARGE SUMMARY      Name:  Will Gamez   Age:  44 y.o.  Sex:  male  :  1980  MRN:  [id number]   Visit Number:  [id number]    Admission Date:  2024  Date of Discharge:  2024  Primary Care Physician:  Estefany Augustine APRN    Important issues to note:    Continue with Augmentin upon discharge.  Follow-up with general surgery being arranged.  Monitor bowel movements, any worsening abdominal pain, high fevers, or any new concerns to report to clinic or ER.    Discharge Diagnoses:     Sigmoid colon diverticulitis and abscess  Mild ileus    Problem List:     Active Hospital Problems    Diagnosis  POA    **Abscess of sigmoid colon due to diverticulitis [K57.20]  Yes      Resolved Hospital Problems   No resolved problems to display.     Presenting Problem:    Chief Complaint   Patient presents with    Abdominal Pain    Dizziness      Consults:     Consulting Physician(s)         Provider   Role Specialty     Sandy Moran MD      Consulting Physician General Surgery          Procedures Performed:        History of presenting illness/Hospital Course:    Patient is a 44-year-old with a history of prior diverticulitis, who had had multiple episodes over the last year, had recent underwent outpatient colonoscopy on 2024 with evidence of diverticulosis and 2 subsequent polyps were removed.  Patient had developed abdominal pain afterwards and had progressed.    Workup in the emergency room the patient had elevated white blood cell count, had a CT scan abdomen pelvis showing 3.1 x 1.8 x 1.2 cm abscess containing fluid and a pocket of gas within the mesentery of the right hemipelvis with severe edema.  Patient was given IV fluid resuscitation, antibiotics, pain control.  He was admitted to the hospital service with general surgery consultation.    Patient was started on empiric antibiotics with Zosyn.  He was continued on IV fluids.  His urine is  improving, clearing now.  He has had some issues with a an ileus which is slowly improving, he is now passing gas, having small bowel movements.  He has been weaned off of IV pain control over the last 24 hours.  He is ambulating and improving.    Had a long discussion with patient about the importance of following recommendations moving forward.  Will continue treatment with Augmentin upon discharge.  Will continue with pain control and as needed antiemetics.  Slowly advance his diet from clear liquids to GI soft diet over the next few days.  I discussed he likely needs 1 week off of work at this time.  We also discussed reasons to return to the clinic or emergency room including severe abdominal pain, obstipation, nausea or vomiting, or other acute concern.    Vital Signs:    Temp:  [99.5 °F (37.5 °C)-100.2 °F (37.9 °C)] 99.6 °F (37.6 °C)  Heart Rate:  [80-92] 80  Resp:  [16-18] 18  BP: (127-143)/() 141/85    Physical Exam:    General Appearance:  Alert and cooperative.  NAD   Head:  Atraumatic and normocephalic.   Eyes: Conjunctivae and sclerae normal, no icterus. No pallor.   Ears:  Ears with no abnormalities noted.   Throat: No oral lesions, no thrush, oral mucosa moist.   Neck: Supple, trachea midline, no thyromegaly.   Back:   No kyphoscoliosis present. No tenderness to palpation.   Lungs:   Breath sounds heard bilaterally equally.  No crackles or wheezing. No Pleural rub or bronchial breathing.   Heart:  Normal S1 and S2, no murmur, no gallop, no rub. No JVD.   Abdomen:   Normal bowel sounds, no masses, no organomegaly.  Mild tenderness, bowel sounds are present.   Extremities: Supple, no edema, no cyanosis, no clubbing.   Pulses: Pulses palpable bilaterally.   Skin: No bleeding or rash.   Neurologic: Alert and oriented x 3. No facial asymmetry. Moves all four limbs. No tremors.     Pertinent Lab Results:     Results from last 7 days   Lab Units 09/05/24  0757 09/04/24  0514 09/03/24  0750   SODIUM  mmol/L 136 138 138   POTASSIUM mmol/L 3.3* 3.4* 4.0   CHLORIDE mmol/L 104 106 105   CO2 mmol/L 21.6* 23.3 22.6   BUN mg/dL 2* 6 9   CREATININE mg/dL 0.90 0.98 1.12   CALCIUM mg/dL 8.6 8.6 8.9   BILIRUBIN mg/dL 1.6* 1.8* 1.6*   ALK PHOS U/L 102 76 69   ALT (SGPT) U/L 30 20 17   AST (SGOT) U/L 25 20 24   GLUCOSE mg/dL 105* 112* 89     Results from last 7 days   Lab Units 09/05/24  0757 09/04/24  0514 09/03/24  0750   WBC 10*3/mm3 7.39 5.22 8.96   HEMOGLOBIN g/dL 11.7* 10.5* 11.2*   HEMATOCRIT % 33.9* 31.0* 33.5*   PLATELETS 10*3/mm3 171 132* 125*                     Results from last 7 days   Lab Units 08/31/24  1700   LIPASE U/L 19         Results from last 7 days   Lab Units 08/31/24  1929 08/31/24  1920   BLOODCX  No growth at 4 days No growth at 4 days       Pertinent Radiology Results:    Imaging Results (All)       Procedure Component Value Units Date/Time    CT Abdomen Pelvis With Contrast [[id number]] Collected: 08/31/24 1910     Updated: 08/31/24 1916    Addenda:        ADDENDUM REPORT    ADDENDUM:  This report was discussed with Cait Winn RN on Aug 31, 2024   19:15:00 EDT.    Authenticated and Electronically Signed by Laura Gilliland MD on  08/31/2024 07:15:32 PM  Signed: 08/31/24 1915 by Laura Gilliland MD    Narrative:      FINAL REPORT    TECHNIQUE:  null    CLINICAL HISTORY:  Lower abd, RLQ abd pain, eval diverticulitis, colitis, kidney  stone, appendici    COMPARISON:  null    FINDINGS:  CT abdomen and pelvis with contrast    Comparison: None    Findings:    Mild dependent changes at the lung bases.    Unremarkable gallbladder and solid organs. No urolithiasis.    3.1 x 1.8 x 1.2 cm abscess containing fluid and a pocket of gas within the mesentery of the right hemipelvis. There is severe edema of the adjacent mesentery. There is edema of adjacent portions of the sigmoid colon and distal ileum. There is edema of a   sigmoidal diverticulum and several additional tiny pockets of mesenteric gas  are present interposed between the abscess and the sigmoid colon. The proximal appendix is unremarkable, but the distal appendix extends into the area of edema and does not well   visualized.    There is an umbilical hernia containing fat.    Unremarkable prostate gland and urinary bladder. Trace pelvic free fluid.    No acute fracture.      Impression:      IMPRESSION:    3.1 cm abscess and several tiny pockets of mesenteric gas within the mesentery of the right hemipelvis. This is most likely on the basis of perforated diverticulitis of the sigmoid colon, but appendix tip perforation is not completely excluded. There is   secondary edema of an adjacent portion of the distal ileum.    Authenticated and Electronically Signed by Laura Gilliland MD on  08/31/2024 07:10:08 PM            Echo:      Condition on Discharge:      Stable.    Code status during the hospital stay:    Code Status and Medical Interventions: CPR (Attempt to Resuscitate); Full Support   Ordered at: 08/31/24 2046     Level Of Support Discussed With:    Patient     Code Status (Patient has no pulse and is not breathing):    CPR (Attempt to Resuscitate)     Medical Interventions (Patient has pulse or is breathing):    Full Support     Discharge Disposition:    Home or Self Care    Discharge Medications:       Discharge Medications        New Medications        Instructions Start Date   amoxicillin-clavulanate 875-125 MG per tablet  Commonly known as: AUGMENTIN   1 tablet, Oral, 2 Times Daily      HYDROcodone-acetaminophen 7.5-325 MG per tablet  Commonly known as: NORCO   1 tablet, Oral, Every 6 Hours PRN      ondansetron 4 MG tablet  Commonly known as: Zofran   4 mg, Oral, Every 8 Hours PRN             Continue These Medications        Instructions Start Date   cyclobenzaprine 5 MG tablet  Commonly known as: FLEXERIL   5 mg, Oral, Nightly PRN             Stop These Medications      nystatin 100,000 unit/mL suspension  Commonly known as: MYCOSTATIN             Discharge Diet:   Clear liquids advance to GI soft    Activity at Discharge:   As tolerated, work excuse for 1 more week    Follow-up Appointments:     Follow-up Information       Karla Parisi PA-C. Follow up in 1 week(s).    Specialties: Physician Assistant, Family Medicine  Why: @ 4:00pm  Contact information:  [address]  [address]  [place] [address]  [phone]               Sandy Moran MD. Go on 9/12/2024.    Specialty: General Surgery  Why: @ 11:30am  Contact information:  [address]  [address]  [facility] [address]  [phone]               Estefany Augustine APRN .    Specialty: Nurse Practitioner  Contact information:  [address]  [facility] [address]  [phone]                           Future Appointments   Date Time Provider Department Center   9/12/2024 11:30 AM Sandy Moran MD [facility]  [facility] [place] [facility]     Test Results Pending at Discharge:    Pending Labs       Order Current Status    Blood Culture - Blood, Arm, Left Preliminary result    Blood Culture - Blood, Hand, Left Preliminary result               Jailyn Mcbride DO  09/05/24  15:23 EDT    Time: I spent 18 minutes on this discharge activity which included: face-to-face encounter with the patient, reviewing the data in the system, coordination of the care with the nursing staff as well as consultants, documentation, and entering orders.     Dictated utilizing Dragon dictation.

## 2024-09-05 NOTE — TELEPHONE ENCOUNTER
Spoke with wife and advised that since patient is seeing Dr. Moran f/u not needed. Wife stated understanding

## 2024-09-05 NOTE — OUTREACH NOTE
Prep Survey      Flowsheet Row Responses   [facility] facility patient discharged from? [facility]   Is LACE score < 7 ? No   Eligibility [facility]   Date of Admission 08/31/24   Date of Discharge 09/05/24   Discharge Disposition Home or Self Care   Discharge diagnosis Abscess of sigmoid colon due to diverticulitis   Does the patient have one of the following disease processes/diagnoses(primary or secondary)? Other   Does the patient have Home health ordered? No   Is there a DME ordered? No   Prep survey completed? Yes            PARI MONDRAGON - Registered Nurse

## 2024-09-05 NOTE — TELEPHONE ENCOUNTER
Patients wife states that the patient only wants to see Estefany Augustine for his hospital follow up and is asking him to be worked in on her schedule.  Please advise.

## 2024-09-06 ENCOUNTER — TRANSITIONAL CARE MANAGEMENT TELEPHONE ENCOUNTER (OUTPATIENT)
Dept: CALL CENTER | Facility: HOSPITAL | Age: 44
End: 2024-09-06
Payer: COMMERCIAL

## 2024-09-06 NOTE — OUTREACH NOTE
Call Center TCM Note      Flowsheet Row Responses   [facility] patient discharged from? [facility]   Does the patient have one of the following disease processes/diagnoses(primary or secondary)? Other   TCM attempt successful? No   Unsuccessful attempts Attempt 2   Call Status Left message            Maura Colindres RN    9/6/2024, 16:41 EDT

## 2024-09-06 NOTE — PAYOR COMM NOTE
"To:  Colman  From: Yanira Justice RN  Phone: [phone]  Fax: [phone]  NPI: [id number]  TIN: [id number]  Member ID: [id number]   MRN: [id number]    Will Gamez (44 y.o. Male)       Date of Birth   1980    Social Security Number       Address   [address] [place] [address]    Home Phone   [phone]    MRN   [id number]       Amish   None    Marital Status                               Admission Date   24    Admission Type   Emergency    Admitting Provider   Juan Escalante DO    Attending Provider       Department, Room/Bed   [facility] TELEMETRY 3, 321/       Discharge Date   2024    Discharge Disposition   Home or Self Care    Discharge Destination   Home                              Attending Provider: (none)   Allergies: No Known Allergies    Isolation: None   Infection: None   Code Status: Prior    Ht: 172.7 cm (68\")   Wt: 104 kg (229 lb 8 oz)    Admission Cmt: None   Principal Problem: Abscess of sigmoid colon due to diverticulitis [K57.20]                   Active Insurance as of 2024       Primary Coverage       Payor Plan Insurance Group Employer/Plan Group    [employer] [facility] [employer] [id number]       Payor Plan Address Payor Plan Phone Number Payor Plan Fax Number Effective Dates    [address] [phone]  2015 - None Entered    [place] [address]         Subscriber Name Subscriber Birth Date Member ID       WILL GAMEZ 1980 [id number]                     Emergency Contacts        (Rel.) Home Phone Work Phone Mobile Phone    LG GAMEZ (Spouse) [phone] -- [phone]                 Discharge Summary        Jailyn Mcbride DO at 24 1128              [facility] HOSPITALIST   DISCHARGE SUMMARY      Name:  Will Gamez   Age:  44 y.o.  Sex:  male  :  1980  MRN:  [id number]   Visit Number:  [id number]    Admission Date:  " 8/31/2024  Date of Discharge:  9/5/2024  Primary Care Physician:  Estefany Augustine APRN    Important issues to note:    Continue with Augmentin upon discharge.  Follow-up with general surgery being arranged.  Monitor bowel movements, any worsening abdominal pain, high fevers, or any new concerns to report to clinic or ER.    Discharge Diagnoses:     Sigmoid colon diverticulitis and abscess  Mild ileus    Problem List:     Active Hospital Problems    Diagnosis  POA    **Abscess of sigmoid colon due to diverticulitis [K57.20]  Yes      Resolved Hospital Problems   No resolved problems to display.     Presenting Problem:    Chief Complaint   Patient presents with    Abdominal Pain    Dizziness      Consults:     Consulting Physician(s)         Provider   Role Specialty     Sandy Moran MD      Consulting Physician General Surgery          Procedures Performed:        History of presenting illness/Hospital Course:    Patient is a 44-year-old with a history of prior diverticulitis, who had had multiple episodes over the last year, had recent underwent outpatient colonoscopy on 8/29/2024 with evidence of diverticulosis and 2 subsequent polyps were removed.  Patient had developed abdominal pain afterwards and had progressed.    Workup in the emergency room the patient had elevated white blood cell count, had a CT scan abdomen pelvis showing 3.1 x 1.8 x 1.2 cm abscess containing fluid and a pocket of gas within the mesentery of the right hemipelvis with severe edema.  Patient was given IV fluid resuscitation, antibiotics, pain control.  He was admitted to the hospital service with general surgery consultation.    Patient was started on empiric antibiotics with Zosyn.  He was continued on IV fluids.  His urine is improving, clearing now.  He has had some issues with a an ileus which is slowly improving, he is now passing gas, having small bowel movements.  He has been weaned off of IV pain control over the last 24  hours.  He is ambulating and improving.    Had a long discussion with patient about the importance of following recommendations moving forward.  Will continue treatment with Augmentin upon discharge.  Will continue with pain control and as needed antiemetics.  Slowly advance his diet from clear liquids to GI soft diet over the next few days.  I discussed he likely needs 1 week off of work at this time.  We also discussed reasons to return to the clinic or emergency room including severe abdominal pain, obstipation, nausea or vomiting, or other acute concern.    Vital Signs:    Temp:  [99.5 °F (37.5 °C)-100.2 °F (37.9 °C)] 99.6 °F (37.6 °C)  Heart Rate:  [80-92] 80  Resp:  [16-18] 18  BP: (127-143)/() 141/85    Physical Exam:    General Appearance:  Alert and cooperative.  NAD   Head:  Atraumatic and normocephalic.   Eyes: Conjunctivae and sclerae normal, no icterus. No pallor.   Ears:  Ears with no abnormalities noted.   Throat: No oral lesions, no thrush, oral mucosa moist.   Neck: Supple, trachea midline, no thyromegaly.   Back:   No kyphoscoliosis present. No tenderness to palpation.   Lungs:   Breath sounds heard bilaterally equally.  No crackles or wheezing. No Pleural rub or bronchial breathing.   Heart:  Normal S1 and S2, no murmur, no gallop, no rub. No JVD.   Abdomen:   Normal bowel sounds, no masses, no organomegaly.  Mild tenderness, bowel sounds are present.   Extremities: Supple, no edema, no cyanosis, no clubbing.   Pulses: Pulses palpable bilaterally.   Skin: No bleeding or rash.   Neurologic: Alert and oriented x 3. No facial asymmetry. Moves all four limbs. No tremors.     Pertinent Lab Results:     Results from last 7 days   Lab Units 09/05/24  0757 09/04/24  0514 09/03/24  0750   SODIUM mmol/L 136 138 138   POTASSIUM mmol/L 3.3* 3.4* 4.0   CHLORIDE mmol/L 104 106 105   CO2 mmol/L 21.6* 23.3 22.6   BUN mg/dL 2* 6 9   CREATININE mg/dL 0.90 0.98 1.12   CALCIUM mg/dL 8.6 8.6 8.9   BILIRUBIN  mg/dL 1.6* 1.8* 1.6*   ALK PHOS U/L 102 76 69   ALT (SGPT) U/L 30 20 17   AST (SGOT) U/L 25 20 24   GLUCOSE mg/dL 105* 112* 89     Results from last 7 days   Lab Units 09/05/24  0757 09/04/24  0514 09/03/24  0750   WBC 10*3/mm3 7.39 5.22 8.96   HEMOGLOBIN g/dL 11.7* 10.5* 11.2*   HEMATOCRIT % 33.9* 31.0* 33.5*   PLATELETS 10*3/mm3 171 132* 125*                     Results from last 7 days   Lab Units 08/31/24  1700   LIPASE U/L 19         Results from last 7 days   Lab Units 08/31/24  1929 08/31/24  1920   BLOODCX  No growth at 4 days No growth at 4 days       Pertinent Radiology Results:    Imaging Results (All)       Procedure Component Value Units Date/Time    CT Abdomen Pelvis With Contrast [[id number]] Collected: 08/31/24 1910     Updated: 08/31/24 1916    Addenda:        ADDENDUM REPORT    ADDENDUM:  This report was discussed with Cait Winn RN on Aug 31, 2024   19:15:00 EDT.    Authenticated and Electronically Signed by Laura Gilliland MD on  08/31/2024 07:15:32 PM  Signed: 08/31/24 1915 by Laura Gilliland MD    Narrative:      FINAL REPORT    TECHNIQUE:  null    CLINICAL HISTORY:  Lower abd, RLQ abd pain, eval diverticulitis, colitis, kidney  stone, appendici    COMPARISON:  null    FINDINGS:  CT abdomen and pelvis with contrast    Comparison: None    Findings:    Mild dependent changes at the lung bases.    Unremarkable gallbladder and solid organs. No urolithiasis.    3.1 x 1.8 x 1.2 cm abscess containing fluid and a pocket of gas within the mesentery of the right hemipelvis. There is severe edema of the adjacent mesentery. There is edema of adjacent portions of the sigmoid colon and distal ileum. There is edema of a   sigmoidal diverticulum and several additional tiny pockets of mesenteric gas are present interposed between the abscess and the sigmoid colon. The proximal appendix is unremarkable, but the distal appendix extends into the area of edema and does not well   visualized.    There is an  umbilical hernia containing fat.    Unremarkable prostate gland and urinary bladder. Trace pelvic free fluid.    No acute fracture.      Impression:      IMPRESSION:    3.1 cm abscess and several tiny pockets of mesenteric gas within the mesentery of the right hemipelvis. This is most likely on the basis of perforated diverticulitis of the sigmoid colon, but appendix tip perforation is not completely excluded. There is   secondary edema of an adjacent portion of the distal ileum.    Authenticated and Electronically Signed by Laura Gilliland MD on  08/31/2024 07:10:08 PM            Echo:      Condition on Discharge:      Stable.    Code status during the hospital stay:    Code Status and Medical Interventions: CPR (Attempt to Resuscitate); Full Support   Ordered at: 08/31/24 2046     Level Of Support Discussed With:    Patient     Code Status (Patient has no pulse and is not breathing):    CPR (Attempt to Resuscitate)     Medical Interventions (Patient has pulse or is breathing):    Full Support     Discharge Disposition:    Home or Self Care    Discharge Medications:       Discharge Medications        New Medications        Instructions Start Date   amoxicillin-clavulanate 875-125 MG per tablet  Commonly known as: AUGMENTIN   1 tablet, Oral, 2 Times Daily      HYDROcodone-acetaminophen 7.5-325 MG per tablet  Commonly known as: NORCO   1 tablet, Oral, Every 6 Hours PRN      ondansetron 4 MG tablet  Commonly known as: Zofran   4 mg, Oral, Every 8 Hours PRN             Continue These Medications        Instructions Start Date   cyclobenzaprine 5 MG tablet  Commonly known as: FLEXERIL   5 mg, Oral, Nightly PRN             Stop These Medications      nystatin 100,000 unit/mL suspension  Commonly known as: MYCOSTATIN            Discharge Diet:   Clear liquids advance to GI soft    Activity at Discharge:   As tolerated, work excuse for 1 more week    Follow-up Appointments:     Follow-up Information       Karla Parisi  MARLEEN Griffith. Follow up in 1 week(s).    Specialties: Physician Assistant, Family Medicine  Why: @ 4:00pm  Contact information:  [address]  [facility] [address]  [phone]               Sandy Moran MD. Go on 9/12/2024.    Specialty: General Surgery  Why: @ 11:30am  Contact information:  [address]  [address]  [facility] [address]  [phone]               Estefany Augustine, APRN .    Specialty: Nurse Practitioner  Contact information:  [address]  [facility] [address]  [phone]                           Future Appointments   Date Time Provider Department Center   9/12/2024 11:30 AM Sandy Moran MD [facility]     Test Results Pending at Discharge:    Pending Labs       Order Current Status    Blood Culture - Blood, Arm, Left Preliminary result    Blood Culture - Blood, Hand, Left Preliminary result               Jailyn Mcbride DO  09/05/24  15:23 EDT    Time: I spent 18 minutes on this discharge activity which included: face-to-face encounter with the patient, reviewing the data in the system, coordination of the care with the nursing staff as well as consultants, documentation, and entering orders.     Dictated utilizing Dragon dictation.      Electronically signed by Jailyn Mcbride DO at 09/05/24 0209

## 2024-09-06 NOTE — OUTREACH NOTE
Call Center TCM Note      Flowsheet Row Responses   [facility] patient discharged from? [facility]   Does the patient have one of the following disease processes/diagnoses(primary or secondary)? Other   TCM attempt successful? No  [No one listed on verbal release]   Unsuccessful attempts Attempt 1   Call Status Left message            Keli Randall RN    9/6/2024, 14:51 EDT

## 2024-09-07 ENCOUNTER — TRANSITIONAL CARE MANAGEMENT TELEPHONE ENCOUNTER (OUTPATIENT)
Dept: CALL CENTER | Facility: HOSPITAL | Age: 44
End: 2024-09-07
Payer: COMMERCIAL

## 2024-09-07 NOTE — OUTREACH NOTE
Call Center TCM Note      Flowsheet Row Responses   [facility] patient discharged from? [facility]   Does the patient have one of the following disease processes/diagnoses(primary or secondary)? Other   TCM attempt successful? No   Unsuccessful attempts Attempt 3            Misty Tellez RN    9/7/2024, 09:34 EDT

## 2024-09-08 DIAGNOSIS — R17 ELEVATED BILIRUBIN: Primary | ICD-10-CM

## 2024-09-08 DIAGNOSIS — E87.6 HYPOKALEMIA: ICD-10-CM

## 2024-09-08 DIAGNOSIS — R79.89 ABNORMAL CBC: ICD-10-CM

## 2024-09-09 ENCOUNTER — LAB (OUTPATIENT)
Dept: LAB | Facility: HOSPITAL | Age: 44
End: 2024-09-09
Payer: COMMERCIAL

## 2024-09-09 PROCEDURE — 85027 COMPLETE CBC AUTOMATED: CPT | Performed by: NURSE PRACTITIONER

## 2024-09-09 PROCEDURE — 80053 COMPREHEN METABOLIC PANEL: CPT | Performed by: NURSE PRACTITIONER

## 2024-09-10 LAB
ALBUMIN SERPL-MCNC: 4.1 G/DL (ref 3.5–5.2)
ALBUMIN/GLOB SERPL: 1 G/DL
ALP SERPL-CCNC: 98 U/L (ref 39–117)
ALT SERPL W P-5'-P-CCNC: 127 U/L (ref 1–41)
ANION GAP SERPL CALCULATED.3IONS-SCNC: 12.3 MMOL/L (ref 5–15)
AST SERPL-CCNC: 60 U/L (ref 1–40)
BILIRUB SERPL-MCNC: 0.9 MG/DL (ref 0–1.2)
BUN SERPL-MCNC: 10 MG/DL (ref 6–20)
BUN/CREAT SERPL: 9.3 (ref 7–25)
CALCIUM SPEC-SCNC: 9.9 MG/DL (ref 8.6–10.5)
CHLORIDE SERPL-SCNC: 101 MMOL/L (ref 98–107)
CO2 SERPL-SCNC: 26.7 MMOL/L (ref 22–29)
CREAT SERPL-MCNC: 1.07 MG/DL (ref 0.76–1.27)
DEPRECATED RDW RBC AUTO: 42.2 FL (ref 37–54)
EGFRCR SERPLBLD CKD-EPI 2021: 86.7 ML/MIN/1.73
ERYTHROCYTE [DISTWIDTH] IN BLOOD BY AUTOMATED COUNT: 12.4 % (ref 12.3–15.4)
GLOBULIN UR ELPH-MCNC: 4 GM/DL
GLUCOSE SERPL-MCNC: 113 MG/DL (ref 65–99)
HCT VFR BLD AUTO: 44.1 % (ref 37.5–51)
HGB BLD-MCNC: 14.9 G/DL (ref 13–17.7)
MCH RBC QN AUTO: 31.4 PG (ref 26.6–33)
MCHC RBC AUTO-ENTMCNC: 33.8 G/DL (ref 31.5–35.7)
MCV RBC AUTO: 92.8 FL (ref 79–97)
PLATELET # BLD AUTO: 381 10*3/MM3 (ref 140–450)
PMV BLD AUTO: 9.4 FL (ref 6–12)
POTASSIUM SERPL-SCNC: 3.7 MMOL/L (ref 3.5–5.2)
PROT SERPL-MCNC: 8.1 G/DL (ref 6–8.5)
RBC # BLD AUTO: 4.75 10*6/MM3 (ref 4.14–5.8)
SODIUM SERPL-SCNC: 140 MMOL/L (ref 136–145)
WBC NRBC COR # BLD AUTO: 7.41 10*3/MM3 (ref 3.4–10.8)

## 2024-09-12 ENCOUNTER — OFFICE VISIT (OUTPATIENT)
Dept: SURGERY | Facility: CLINIC | Age: 44
End: 2024-09-12
Payer: COMMERCIAL

## 2024-09-12 VITALS
WEIGHT: 199 LBS | OXYGEN SATURATION: 98 % | DIASTOLIC BLOOD PRESSURE: 72 MMHG | BODY MASS INDEX: 30.16 KG/M2 | HEIGHT: 68 IN | TEMPERATURE: 97.7 F | SYSTOLIC BLOOD PRESSURE: 128 MMHG | HEART RATE: 74 BPM

## 2024-09-12 DIAGNOSIS — K57.20 COLONIC DIVERTICULAR ABSCESS: ICD-10-CM

## 2024-09-12 DIAGNOSIS — K57.32 SIGMOID DIVERTICULITIS: Primary | ICD-10-CM

## 2024-09-12 PROCEDURE — 99213 OFFICE O/P EST LOW 20 MIN: CPT | Performed by: SURGERY

## 2024-09-26 ENCOUNTER — HOSPITAL ENCOUNTER (OUTPATIENT)
Dept: CT IMAGING | Facility: HOSPITAL | Age: 44
Discharge: HOME | End: 2024-09-26
Admitting: SURGERY
Payer: COMMERCIAL

## 2024-09-26 DIAGNOSIS — K57.20 COLONIC DIVERTICULAR ABSCESS: ICD-10-CM

## 2024-09-26 DIAGNOSIS — K57.32 SIGMOID DIVERTICULITIS: ICD-10-CM

## 2024-09-26 PROCEDURE — 25510000001 IOPAMIDOL 61 % SOLUTION: Performed by: SURGERY

## 2024-09-26 PROCEDURE — 74177 CT ABD & PELVIS W/CONTRAST: CPT

## 2024-09-26 RX ORDER — IOPAMIDOL 612 MG/ML
100 INJECTION, SOLUTION INTRAVASCULAR
Status: COMPLETED | OUTPATIENT
Start: 2024-09-26 | End: 2024-09-26

## 2024-09-26 RX ADMIN — IOPAMIDOL 85 ML: 612 INJECTION, SOLUTION INTRAVENOUS at 16:02

## 2024-10-03 ENCOUNTER — TELEPHONE (OUTPATIENT)
Dept: SURGERY | Facility: CLINIC | Age: 44
End: 2024-10-03
Payer: COMMERCIAL

## 2024-10-10 NOTE — PROGRESS NOTES
Subjective   Will Gamez is a 44 y.o. male.   Chief Complaint   Patient presents with    Follow-up     CT abdomen/pelvis      History of Present Illness     Mr. Gamez returns the office for routine follow-up related to his recent hospitalization for complicated diverticulitis.  He recently underwent a repeat CT scan of the abdomen pelvis for reassessment of his pericolonic abscess.  Fortunately, CT imaging performed on 9/26/2024 demonstrated a few residual small pockets of free air from the patient's previous microperforation with resolution of the previous focal fluid collection/intra-abdominal abscess.  Decreasing inflammatory change of the sigmoid colon was also noted.  He reports that he is doing well overall and feels like his health status has returned to baseline.        The following portions of the patient's history were reviewed and updated as appropriate: allergies, current medications, past family history, past medical history, past social history, past surgical history, and problem list.    Review of Systems   Constitutional:  Negative for chills, fever and unexpected weight change.   HENT:  Negative for trouble swallowing and voice change.    Eyes:  Negative for visual disturbance.   Respiratory:  Negative for apnea, cough, chest tightness, shortness of breath and wheezing.    Cardiovascular:  Negative for chest pain, palpitations and leg swelling.   Gastrointestinal:  Negative for abdominal distention, abdominal pain, anal bleeding, blood in stool, constipation, diarrhea, nausea, rectal pain and vomiting.   Endocrine: Negative for cold intolerance and heat intolerance.   Genitourinary:  Negative for difficulty urinating, dysuria, flank pain, scrotal swelling and testicular pain.   Musculoskeletal:  Negative for back pain, gait problem and joint swelling.   Skin:  Negative for color change, rash and wound.   Neurological:  Negative for dizziness, syncope, speech difficulty, weakness, numbness  "and headaches.   Hematological:  Negative for adenopathy. Does not bruise/bleed easily.   Psychiatric/Behavioral:  Negative for confusion. The patient is not nervous/anxious.        Objective   /78   Pulse 77   Temp 98 °F (36.7 °C)   Ht 172.7 cm (67.99\")   Wt 93 kg (205 lb)   SpO2 98%   BMI 31.18 kg/m²     Physical Exam  Constitutional:       Appearance: He is well-developed.   HENT:      Head: Normocephalic and atraumatic.   Eyes:      General: No scleral icterus.     Pupils: Pupils are equal, round, and reactive to light.   Cardiovascular:      Rate and Rhythm: Regular rhythm.   Pulmonary:      Effort: Pulmonary effort is normal.   Abdominal:      General: There is no distension.      Palpations: Abdomen is soft.      Tenderness: There is no abdominal tenderness.   Skin:     General: Skin is warm and dry.   Neurological:      Mental Status: He is alert and oriented to person, place, and time.   Psychiatric:         Behavior: Behavior normal.         Assessment & Plan   Diagnoses and all orders for this visit:    1. Diverticulosis of colon (Primary)      We discussed the findings on the recent CT imaging.  From a surgical perspective, no additional intervention is required.  We discussed dietary advancement, and overall long-term management of diverticular disease of the colon.  The patient will need to follow-up with his PCP as regularly scheduled, and will be due for a 5-year surveillance colonoscopy in August 2027.  In the interim he may follow-up in this office as needed, and with his established gastroenterologist as needed.           "

## 2024-10-22 ENCOUNTER — OFFICE VISIT (OUTPATIENT)
Dept: SURGERY | Facility: CLINIC | Age: 44
End: 2024-10-22
Payer: COMMERCIAL

## 2024-10-22 VITALS
TEMPERATURE: 98 F | DIASTOLIC BLOOD PRESSURE: 78 MMHG | OXYGEN SATURATION: 98 % | HEART RATE: 77 BPM | SYSTOLIC BLOOD PRESSURE: 126 MMHG | BODY MASS INDEX: 31.07 KG/M2 | HEIGHT: 68 IN | WEIGHT: 205 LBS

## 2024-10-22 DIAGNOSIS — K57.30 DIVERTICULOSIS OF COLON: Primary | ICD-10-CM

## 2024-10-22 PROCEDURE — 99213 OFFICE O/P EST LOW 20 MIN: CPT | Performed by: SURGERY

## 2024-11-04 PROBLEM — K57.32 SIGMOID DIVERTICULITIS: Status: RESOLVED | Noted: 2024-07-23 | Resolved: 2024-11-04

## 2024-11-04 PROBLEM — R93.3 ABNORMAL FINDING ON GI TRACT IMAGING: Status: RESOLVED | Noted: 2024-07-23 | Resolved: 2024-11-04

## 2024-11-04 PROBLEM — K57.20 ABSCESS OF SIGMOID COLON DUE TO DIVERTICULITIS: Status: RESOLVED | Noted: 2024-08-31 | Resolved: 2024-11-04

## 2025-05-08 ENCOUNTER — TELEPHONE (OUTPATIENT)
Dept: INTERNAL MEDICINE | Facility: CLINIC | Age: 45
End: 2025-05-08

## 2025-05-08 ENCOUNTER — OFFICE VISIT (OUTPATIENT)
Dept: INTERNAL MEDICINE | Facility: CLINIC | Age: 45
End: 2025-05-08
Payer: COMMERCIAL

## 2025-05-08 VITALS
RESPIRATION RATE: 14 BRPM | DIASTOLIC BLOOD PRESSURE: 76 MMHG | HEIGHT: 68 IN | BODY MASS INDEX: 33.19 KG/M2 | OXYGEN SATURATION: 97 % | HEART RATE: 86 BPM | TEMPERATURE: 98.2 F | SYSTOLIC BLOOD PRESSURE: 118 MMHG | WEIGHT: 219 LBS

## 2025-05-08 DIAGNOSIS — M25.511 ACUTE PAIN OF RIGHT SHOULDER: ICD-10-CM

## 2025-05-08 DIAGNOSIS — M25.521 RIGHT ELBOW PAIN: ICD-10-CM
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
## 2025-05-08 RX ORDER — CYCLOBENZAPRINE HCL 5 MG
5 TABLET ORAL NIGHTLY PRN
Qty: 20 TABLET | Refills: 1 | Status: SHIPPED | OUTPATIENT
Start: 2025-05-08
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
## (undated) DEVICE — SINGLE-USE POLYPECTOMY SNARE: Brand: CAPTIVATOR II

## (undated) DEVICE — QUICK CATCH IN-LINE SUCTION POLYP TRAP IS USED FOR SUCTION RETRIEVAL OF ENDOSCOPICALLY REMOVED POLYPS.

## (undated) DEVICE — FRCP BX RADJAW4 NDL 2.8 240 STD OG

## (undated) DEVICE — HYBRID CO2 TUBING/CAP SET FOR OLYMPUS® SCOPES & CO2 SOURCE: Brand: ERBE

## (undated) DEVICE — Device

## (undated) DEVICE — LUBE JELLY PK/2.75GM STRL BX/144

## (undated) DEVICE — VLV SXN AIR/H2O ORCAPOD3 1P/U STRL

## (undated) DEVICE — ENDOSCOPY PORT CONNECTOR FOR OLYMPUS® SCOPES: Brand: ERBE